# Patient Record
Sex: FEMALE | Race: WHITE | Employment: OTHER | URBAN - METROPOLITAN AREA
[De-identification: names, ages, dates, MRNs, and addresses within clinical notes are randomized per-mention and may not be internally consistent; named-entity substitution may affect disease eponyms.]

---

## 2017-01-01 ENCOUNTER — APPOINTMENT (OUTPATIENT)
Dept: CT IMAGING | Age: 67
DRG: 603 | End: 2017-01-01
Attending: INTERNAL MEDICINE
Payer: COMMERCIAL

## 2017-01-01 ENCOUNTER — OFFICE VISIT (OUTPATIENT)
Dept: OBGYN CLINIC | Age: 67
End: 2017-01-01

## 2017-01-01 ENCOUNTER — HOSPITAL ENCOUNTER (INPATIENT)
Age: 67
LOS: 3 days | Discharge: HOME | DRG: 603 | End: 2017-12-20
Attending: EMERGENCY MEDICINE | Admitting: INTERNAL MEDICINE
Payer: COMMERCIAL

## 2017-01-01 ENCOUNTER — APPOINTMENT (OUTPATIENT)
Dept: GENERAL RADIOLOGY | Age: 67
DRG: 603 | End: 2017-01-01
Attending: EMERGENCY MEDICINE
Payer: COMMERCIAL

## 2017-01-01 VITALS
DIASTOLIC BLOOD PRESSURE: 60 MMHG | SYSTOLIC BLOOD PRESSURE: 133 MMHG | BODY MASS INDEX: 44.3 KG/M2 | HEART RATE: 93 BPM | TEMPERATURE: 98.2 F | HEIGHT: 63 IN | WEIGHT: 250 LBS | RESPIRATION RATE: 18 BRPM | OXYGEN SATURATION: 95 %

## 2017-01-01 VITALS
DIASTOLIC BLOOD PRESSURE: 70 MMHG | SYSTOLIC BLOOD PRESSURE: 132 MMHG | HEIGHT: 65 IN | BODY MASS INDEX: 41.32 KG/M2 | WEIGHT: 248 LBS

## 2017-01-01 DIAGNOSIS — K75.81 LIVER CIRRHOSIS SECONDARY TO NASH (HCC): ICD-10-CM

## 2017-01-01 DIAGNOSIS — L03.116 CELLULITIS OF LEFT LOWER EXTREMITY: Primary | ICD-10-CM

## 2017-01-01 DIAGNOSIS — Z01.419 ENCOUNTER FOR GYNECOLOGICAL EXAMINATION WITHOUT ABNORMAL FINDING: Primary | ICD-10-CM

## 2017-01-01 DIAGNOSIS — D69.6 THROMBOCYTOPENIA (HCC): ICD-10-CM

## 2017-01-01 DIAGNOSIS — Z12.31 ENCOUNTER FOR SCREENING MAMMOGRAM FOR MALIGNANT NEOPLASM OF BREAST: Primary | ICD-10-CM

## 2017-01-01 DIAGNOSIS — K74.60 LIVER CIRRHOSIS SECONDARY TO NASH (HCC): ICD-10-CM

## 2017-01-01 DIAGNOSIS — I82.432 ACUTE DEEP VEIN THROMBOSIS (DVT) OF POPLITEAL VEIN OF LEFT LOWER EXTREMITY (HCC): ICD-10-CM

## 2017-01-01 DIAGNOSIS — I85.10 SECONDARY ESOPHAGEAL VARICES WITHOUT BLEEDING (HCC): ICD-10-CM

## 2017-01-01 LAB
ADDITIONAL INFORMATION 480297: NORMAL
ALBUMIN SERPL-MCNC: 2 G/DL (ref 3.5–5)
ALBUMIN SERPL-MCNC: 2.3 G/DL (ref 3.5–5)
ALBUMIN SERPL-MCNC: 2.7 G/DL (ref 3.5–5)
ALBUMIN/GLOB SERPL: 0.6 {RATIO} (ref 1.1–2.2)
ALBUMIN/GLOB SERPL: 0.7 {RATIO} (ref 1.1–2.2)
ALBUMIN/GLOB SERPL: 0.7 {RATIO} (ref 1.1–2.2)
ALP SERPL-CCNC: 116 U/L (ref 45–117)
ALP SERPL-CCNC: 120 U/L (ref 45–117)
ALP SERPL-CCNC: 137 U/L (ref 45–117)
ALT SERPL-CCNC: 28 U/L (ref 12–78)
ALT SERPL-CCNC: 30 U/L (ref 12–78)
ALT SERPL-CCNC: 40 U/L (ref 12–78)
ANION GAP SERPL CALC-SCNC: 10 MMOL/L (ref 5–15)
ANION GAP SERPL CALC-SCNC: 7 MMOL/L (ref 5–15)
ANION GAP SERPL CALC-SCNC: 7 MMOL/L (ref 5–15)
ANION GAP SERPL CALC-SCNC: 8 MMOL/L (ref 5–15)
ANION GAP SERPL CALC-SCNC: 8 MMOL/L (ref 5–15)
APPEARANCE UR: ABNORMAL
APTT PPP: 33 SEC (ref 22.1–32.5)
AST SERPL-CCNC: 50 U/L (ref 15–37)
AST SERPL-CCNC: 59 U/L (ref 15–37)
AST SERPL-CCNC: 72 U/L (ref 15–37)
AT III PPP CHRO-ACNC: 50 % (ref 75–135)
BACTERIA SPEC CULT: NORMAL
BACTERIA URNS QL MICRO: ABNORMAL /HPF
BASOPHILS # BLD: 0 K/UL (ref 0–0.1)
BASOPHILS # BLD: 0 K/UL (ref 0–0.1)
BASOPHILS NFR BLD: 0 % (ref 0–1)
BASOPHILS NFR BLD: 0 % (ref 0–1)
BILIRUB SERPL-MCNC: 1.5 MG/DL (ref 0.2–1)
BILIRUB SERPL-MCNC: 2 MG/DL (ref 0.2–1)
BILIRUB SERPL-MCNC: 2.7 MG/DL (ref 0.2–1)
BILIRUB UR QL CFM: POSITIVE
BUN SERPL-MCNC: 13 MG/DL (ref 6–20)
BUN SERPL-MCNC: 17 MG/DL (ref 6–20)
BUN SERPL-MCNC: 18 MG/DL (ref 6–20)
BUN SERPL-MCNC: 6 MG/DL (ref 6–20)
BUN SERPL-MCNC: 8 MG/DL (ref 6–20)
BUN/CREAT SERPL: 10 (ref 12–20)
BUN/CREAT SERPL: 16 (ref 12–20)
BUN/CREAT SERPL: 18 (ref 12–20)
BUN/CREAT SERPL: 19 (ref 12–20)
BUN/CREAT SERPL: 8 (ref 12–20)
CALCIUM SERPL-MCNC: 7.8 MG/DL (ref 8.5–10.1)
CALCIUM SERPL-MCNC: 7.9 MG/DL (ref 8.5–10.1)
CALCIUM SERPL-MCNC: 8 MG/DL (ref 8.5–10.1)
CALCIUM SERPL-MCNC: 8.1 MG/DL (ref 8.5–10.1)
CALCIUM SERPL-MCNC: 8.8 MG/DL (ref 8.5–10.1)
CC UR VC: NORMAL
CHLORIDE SERPL-SCNC: 106 MMOL/L (ref 97–108)
CHLORIDE SERPL-SCNC: 107 MMOL/L (ref 97–108)
CHLORIDE SERPL-SCNC: 109 MMOL/L (ref 97–108)
CK MB CFR SERPL CALC: NORMAL % (ref 0–2.5)
CK MB SERPL-MCNC: <1 NG/ML (ref 5–25)
CK SERPL-CCNC: 71 U/L (ref 26–192)
CO2 SERPL-SCNC: 24 MMOL/L (ref 21–32)
CO2 SERPL-SCNC: 25 MMOL/L (ref 21–32)
CO2 SERPL-SCNC: 26 MMOL/L (ref 21–32)
COLOR UR: ABNORMAL
COMMENT, 511217: NORMAL
CREAT SERPL-MCNC: 0.74 MG/DL (ref 0.55–1.02)
CREAT SERPL-MCNC: 0.8 MG/DL (ref 0.55–1.02)
CREAT SERPL-MCNC: 0.8 MG/DL (ref 0.55–1.02)
CREAT SERPL-MCNC: 0.91 MG/DL (ref 0.55–1.02)
CREAT SERPL-MCNC: 0.99 MG/DL (ref 0.55–1.02)
CRP SERPL-MCNC: 3.18 MG/DL
DATE LAST DOSE: ABNORMAL
DATE LAST DOSE: ABNORMAL
DIFFERENTIAL METHOD BLD: ABNORMAL
EOSINOPHIL # BLD: 0.1 K/UL (ref 0–0.4)
EOSINOPHIL # BLD: 0.2 K/UL (ref 0–0.4)
EOSINOPHIL NFR BLD: 1 % (ref 0–7)
EOSINOPHIL NFR BLD: 3 % (ref 0–7)
EPITH CASTS URNS QL MICRO: ABNORMAL /LPF
ERYTHROCYTE [DISTWIDTH] IN BLOOD BY AUTOMATED COUNT: 14.7 % (ref 11.5–14.5)
ERYTHROCYTE [DISTWIDTH] IN BLOOD BY AUTOMATED COUNT: 14.9 % (ref 11.5–14.5)
ERYTHROCYTE [DISTWIDTH] IN BLOOD BY AUTOMATED COUNT: 15 % (ref 11.5–14.5)
ERYTHROCYTE [DISTWIDTH] IN BLOOD BY AUTOMATED COUNT: 15 % (ref 11.5–14.5)
ERYTHROCYTE [DISTWIDTH] IN BLOOD BY AUTOMATED COUNT: 15.1 % (ref 11.5–14.5)
ERYTHROCYTE [SEDIMENTATION RATE] IN BLOOD: 19 MM/HR (ref 0–30)
F2 GENE MUT ANL BLD/T: NORMAL
F5 GENE MUT ANL BLD/T: NORMAL
GLOBULIN SER CALC-MCNC: 3.3 G/DL (ref 2–4)
GLOBULIN SER CALC-MCNC: 3.5 G/DL (ref 2–4)
GLOBULIN SER CALC-MCNC: 4.1 G/DL (ref 2–4)
GLUCOSE SERPL-MCNC: 121 MG/DL (ref 65–100)
GLUCOSE SERPL-MCNC: 126 MG/DL (ref 65–100)
GLUCOSE SERPL-MCNC: 127 MG/DL (ref 65–100)
GLUCOSE SERPL-MCNC: 85 MG/DL (ref 65–100)
GLUCOSE SERPL-MCNC: 91 MG/DL (ref 65–100)
GLUCOSE UR STRIP.AUTO-MCNC: NEGATIVE MG/DL
HBV CORE IGM SER QL: NONREACTIVE
HBV SURFACE AB SER QL: NONREACTIVE
HBV SURFACE AB SER-ACNC: <3.1 MIU/ML
HBV SURFACE AG SER QL: <0.1 INDEX
HBV SURFACE AG SER QL: NEGATIVE
HCT VFR BLD AUTO: 31.5 % (ref 35–47)
HCT VFR BLD AUTO: 31.6 % (ref 35–47)
HCT VFR BLD AUTO: 32.5 % (ref 35–47)
HCT VFR BLD AUTO: 33.4 % (ref 35–47)
HCT VFR BLD AUTO: 38 % (ref 35–47)
HCV AB SERPL QL IA: NONREACTIVE
HCV COMMENT,HCGAC: NORMAL
HGB BLD-MCNC: 10.5 G/DL (ref 11.5–16)
HGB BLD-MCNC: 10.6 G/DL (ref 11.5–16)
HGB BLD-MCNC: 10.7 G/DL (ref 11.5–16)
HGB BLD-MCNC: 11 G/DL (ref 11.5–16)
HGB BLD-MCNC: 12.6 G/DL (ref 11.5–16)
HGB UR QL STRIP: ABNORMAL
INR PPP: 1.5 (ref 0.9–1.1)
INR PPP: 1.6 (ref 0.9–1.1)
KETONES UR QL STRIP.AUTO: ABNORMAL MG/DL
LA PPP-IMP: NORMAL
LACTATE SERPL-SCNC: 1.2 MMOL/L (ref 0.4–2)
LACTATE SERPL-SCNC: 2.2 MMOL/L (ref 0.4–2)
LEUKOCYTE ESTERASE UR QL STRIP.AUTO: ABNORMAL
LYMPHOCYTES # BLD: 1 K/UL (ref 0.8–3.5)
LYMPHOCYTES # BLD: 1.2 K/UL (ref 0.8–3.5)
LYMPHOCYTES NFR BLD: 11 % (ref 12–49)
LYMPHOCYTES NFR BLD: 15 % (ref 12–49)
MAGNESIUM SERPL-MCNC: 1.7 MG/DL (ref 1.6–2.4)
MCH RBC QN AUTO: 31.6 PG (ref 26–34)
MCH RBC QN AUTO: 31.7 PG (ref 26–34)
MCH RBC QN AUTO: 31.8 PG (ref 26–34)
MCH RBC QN AUTO: 31.8 PG (ref 26–34)
MCH RBC QN AUTO: 32.2 PG (ref 26–34)
MCHC RBC AUTO-ENTMCNC: 32.9 G/DL (ref 30–36.5)
MCHC RBC AUTO-ENTMCNC: 32.9 G/DL (ref 30–36.5)
MCHC RBC AUTO-ENTMCNC: 33.2 G/DL (ref 30–36.5)
MCHC RBC AUTO-ENTMCNC: 33.3 G/DL (ref 30–36.5)
MCHC RBC AUTO-ENTMCNC: 33.5 G/DL (ref 30–36.5)
MCV RBC AUTO: 94.9 FL (ref 80–99)
MCV RBC AUTO: 95.5 FL (ref 80–99)
MCV RBC AUTO: 96 FL (ref 80–99)
MCV RBC AUTO: 96.2 FL (ref 80–99)
MCV RBC AUTO: 97.2 FL (ref 80–99)
MONOCYTES # BLD: 0.7 K/UL (ref 0–1)
MONOCYTES # BLD: 0.8 K/UL (ref 0–1)
MONOCYTES NFR BLD: 10 % (ref 5–13)
MONOCYTES NFR BLD: 7 % (ref 5–13)
NEUTS SEG # BLD: 5.6 K/UL (ref 1.8–8)
NEUTS SEG # BLD: 7.9 K/UL (ref 1.8–8)
NEUTS SEG NFR BLD: 72 % (ref 32–75)
NEUTS SEG NFR BLD: 81 % (ref 32–75)
NITRITE UR QL STRIP.AUTO: NEGATIVE
PERIPHERAL SMEAR,PSM: NORMAL
PH UR STRIP: 5.5 [PH] (ref 5–8)
PLATELET # BLD AUTO: 41 K/UL (ref 150–400)
PLATELET # BLD AUTO: 44 K/UL (ref 150–400)
PLATELET # BLD AUTO: 52 K/UL (ref 150–400)
PLATELET # BLD AUTO: 57 K/UL (ref 150–400)
PLATELET # BLD AUTO: 59 K/UL (ref 150–400)
POTASSIUM SERPL-SCNC: 3.5 MMOL/L (ref 3.5–5.1)
POTASSIUM SERPL-SCNC: 3.6 MMOL/L (ref 3.5–5.1)
POTASSIUM SERPL-SCNC: 3.7 MMOL/L (ref 3.5–5.1)
POTASSIUM SERPL-SCNC: 3.7 MMOL/L (ref 3.5–5.1)
POTASSIUM SERPL-SCNC: 3.8 MMOL/L (ref 3.5–5.1)
PROT C AG PPP IA-ACNC: 25 % (ref 60–150)
PROT S ACT/NOR PPP: 65 % (ref 57–157)
PROT S PPP-ACNC: 38 % (ref 60–150)
PROT SERPL-MCNC: 5.5 G/DL (ref 6.4–8.2)
PROT SERPL-MCNC: 5.6 G/DL (ref 6.4–8.2)
PROT SERPL-MCNC: 6.8 G/DL (ref 6.4–8.2)
PROT UR STRIP-MCNC: NEGATIVE MG/DL
PROTHROMBIN TIME: 14.8 SEC (ref 9–11.1)
PROTHROMBIN TIME: 16.2 SEC (ref 9–11.1)
RBC # BLD AUTO: 3.3 M/UL (ref 3.8–5.2)
RBC # BLD AUTO: 3.33 M/UL (ref 3.8–5.2)
RBC # BLD AUTO: 3.38 M/UL (ref 3.8–5.2)
RBC # BLD AUTO: 3.48 M/UL (ref 3.8–5.2)
RBC # BLD AUTO: 3.91 M/UL (ref 3.8–5.2)
RBC #/AREA URNS HPF: ABNORMAL /HPF (ref 0–5)
RBC MORPH BLD: ABNORMAL
REPORTED DOSE,DOSE: ABNORMAL UNITS
REPORTED DOSE,DOSE: ABNORMAL UNITS
REPORTED DOSE/TIME,TMG: 1700
REPORTED DOSE/TIME,TMG: 1700
SCREEN APTT: 41.9 SEC (ref 0–51.9)
SCREEN DRVVT: 39.9 SEC (ref 0–47)
SERVICE CMNT-IMP: NORMAL
SODIUM SERPL-SCNC: 138 MMOL/L (ref 136–145)
SODIUM SERPL-SCNC: 139 MMOL/L (ref 136–145)
SODIUM SERPL-SCNC: 140 MMOL/L (ref 136–145)
SODIUM SERPL-SCNC: 141 MMOL/L (ref 136–145)
SODIUM SERPL-SCNC: 143 MMOL/L (ref 136–145)
SP GR UR REFRACTOMETRY: 1.03 (ref 1–1.03)
THERAPEUTIC RANGE,PTTT: ABNORMAL SECS (ref 58–77)
UA: UC IF INDICATED,UAUC: ABNORMAL
UROBILINOGEN UR QL STRIP.AUTO: 1 EU/DL (ref 0.2–1)
VANCOMYCIN TROUGH SERPL-MCNC: 15.6 UG/ML (ref 5–10)
VANCOMYCIN TROUGH SERPL-MCNC: 4.4 UG/ML (ref 5–10)
VIT B12 SERPL-MCNC: 952 PG/ML (ref 211–911)
WBC # BLD AUTO: 3.4 K/UL (ref 3.6–11)
WBC # BLD AUTO: 4 K/UL (ref 3.6–11)
WBC # BLD AUTO: 5.3 K/UL (ref 3.6–11)
WBC # BLD AUTO: 7.8 K/UL (ref 3.6–11)
WBC # BLD AUTO: 9.7 K/UL (ref 3.6–11)
WBC URNS QL MICRO: ABNORMAL /HPF (ref 0–4)

## 2017-01-01 PROCEDURE — 85730 THROMBOPLASTIN TIME PARTIAL: CPT | Performed by: EMERGENCY MEDICINE

## 2017-01-01 PROCEDURE — 82550 ASSAY OF CK (CPK): CPT | Performed by: EMERGENCY MEDICINE

## 2017-01-01 PROCEDURE — 74011250636 HC RX REV CODE- 250/636: Performed by: INTERNAL MEDICINE

## 2017-01-01 PROCEDURE — 93971 EXTREMITY STUDY: CPT

## 2017-01-01 PROCEDURE — 80053 COMPREHEN METABOLIC PANEL: CPT | Performed by: EMERGENCY MEDICINE

## 2017-01-01 PROCEDURE — 85302 CLOT INHIBIT PROT C ANTIGEN: CPT | Performed by: INTERNAL MEDICINE

## 2017-01-01 PROCEDURE — 85610 PROTHROMBIN TIME: CPT | Performed by: INTERNAL MEDICINE

## 2017-01-01 PROCEDURE — 85613 RUSSELL VIPER VENOM DILUTED: CPT | Performed by: INTERNAL MEDICINE

## 2017-01-01 PROCEDURE — 36415 COLL VENOUS BLD VENIPUNCTURE: CPT | Performed by: INTERNAL MEDICINE

## 2017-01-01 PROCEDURE — 81001 URINALYSIS AUTO W/SCOPE: CPT | Performed by: EMERGENCY MEDICINE

## 2017-01-01 PROCEDURE — 80048 BASIC METABOLIC PNL TOTAL CA: CPT | Performed by: INTERNAL MEDICINE

## 2017-01-01 PROCEDURE — 99284 EMERGENCY DEPT VISIT MOD MDM: CPT

## 2017-01-01 PROCEDURE — 83605 ASSAY OF LACTIC ACID: CPT | Performed by: EMERGENCY MEDICINE

## 2017-01-01 PROCEDURE — 74011250637 HC RX REV CODE- 250/637: Performed by: INTERNAL MEDICINE

## 2017-01-01 PROCEDURE — 74011000258 HC RX REV CODE- 258: Performed by: EMERGENCY MEDICINE

## 2017-01-01 PROCEDURE — 82607 VITAMIN B-12: CPT | Performed by: INTERNAL MEDICINE

## 2017-01-01 PROCEDURE — 74011000258 HC RX REV CODE- 258: Performed by: INTERNAL MEDICINE

## 2017-01-01 PROCEDURE — 74011636320 HC RX REV CODE- 636/320: Performed by: RADIOLOGY

## 2017-01-01 PROCEDURE — 80053 COMPREHEN METABOLIC PANEL: CPT | Performed by: INTERNAL MEDICINE

## 2017-01-01 PROCEDURE — 96361 HYDRATE IV INFUSION ADD-ON: CPT

## 2017-01-01 PROCEDURE — 86803 HEPATITIS C AB TEST: CPT | Performed by: INTERNAL MEDICINE

## 2017-01-01 PROCEDURE — 99218 HC RM OBSERVATION: CPT

## 2017-01-01 PROCEDURE — 85027 COMPLETE CBC AUTOMATED: CPT | Performed by: INTERNAL MEDICINE

## 2017-01-01 PROCEDURE — 87040 BLOOD CULTURE FOR BACTERIA: CPT | Performed by: EMERGENCY MEDICINE

## 2017-01-01 PROCEDURE — 80202 ASSAY OF VANCOMYCIN: CPT | Performed by: INTERNAL MEDICINE

## 2017-01-01 PROCEDURE — 36415 COLL VENOUS BLD VENIPUNCTURE: CPT | Performed by: EMERGENCY MEDICINE

## 2017-01-01 PROCEDURE — 83735 ASSAY OF MAGNESIUM: CPT | Performed by: INTERNAL MEDICINE

## 2017-01-01 PROCEDURE — 65660000000 HC RM CCU STEPDOWN

## 2017-01-01 PROCEDURE — 96365 THER/PROPH/DIAG IV INF INIT: CPT

## 2017-01-01 PROCEDURE — 96367 TX/PROPH/DG ADDL SEQ IV INF: CPT

## 2017-01-01 PROCEDURE — 87086 URINE CULTURE/COLONY COUNT: CPT | Performed by: EMERGENCY MEDICINE

## 2017-01-01 PROCEDURE — 83605 ASSAY OF LACTIC ACID: CPT | Performed by: INTERNAL MEDICINE

## 2017-01-01 PROCEDURE — 74011250636 HC RX REV CODE- 250/636: Performed by: EMERGENCY MEDICINE

## 2017-01-01 PROCEDURE — 85300 ANTITHROMBIN III ACTIVITY: CPT | Performed by: INTERNAL MEDICINE

## 2017-01-01 PROCEDURE — 72191 CT ANGIOGRAPH PELV W/O&W/DYE: CPT

## 2017-01-01 PROCEDURE — 86140 C-REACTIVE PROTEIN: CPT | Performed by: EMERGENCY MEDICINE

## 2017-01-01 PROCEDURE — 81240 F2 GENE: CPT | Performed by: INTERNAL MEDICINE

## 2017-01-01 PROCEDURE — 85025 COMPLETE CBC W/AUTO DIFF WBC: CPT | Performed by: EMERGENCY MEDICINE

## 2017-01-01 PROCEDURE — 65270000029 HC RM PRIVATE

## 2017-01-01 PROCEDURE — 87340 HEPATITIS B SURFACE AG IA: CPT | Performed by: INTERNAL MEDICINE

## 2017-01-01 PROCEDURE — 86705 HEP B CORE ANTIBODY IGM: CPT | Performed by: INTERNAL MEDICINE

## 2017-01-01 PROCEDURE — 85652 RBC SED RATE AUTOMATED: CPT | Performed by: EMERGENCY MEDICINE

## 2017-01-01 PROCEDURE — 86706 HEP B SURFACE ANTIBODY: CPT | Performed by: INTERNAL MEDICINE

## 2017-01-01 PROCEDURE — 85305 CLOT INHIBIT PROT S TOTAL: CPT | Performed by: INTERNAL MEDICINE

## 2017-01-01 PROCEDURE — 73590 X-RAY EXAM OF LOWER LEG: CPT

## 2017-01-01 PROCEDURE — 81241 F5 GENE: CPT | Performed by: INTERNAL MEDICINE

## 2017-01-01 PROCEDURE — 85025 COMPLETE CBC W/AUTO DIFF WBC: CPT | Performed by: INTERNAL MEDICINE

## 2017-01-01 RX ORDER — ENOXAPARIN SODIUM 100 MG/ML
1 INJECTION SUBCUTANEOUS
Status: COMPLETED | OUTPATIENT
Start: 2017-01-01 | End: 2017-01-01

## 2017-01-01 RX ORDER — OMEPRAZOLE 20 MG/1
20 CAPSULE, DELAYED RELEASE ORAL DAILY
Status: DISCONTINUED | OUTPATIENT
Start: 2017-01-01 | End: 2017-01-01 | Stop reason: HOSPADM

## 2017-01-01 RX ORDER — VANCOMYCIN/0.9 % SOD CHLORIDE 1.5G/250ML
1500 PLASTIC BAG, INJECTION (ML) INTRAVENOUS EVERY 12 HOURS
Status: DISCONTINUED | OUTPATIENT
Start: 2017-01-01 | End: 2017-01-01 | Stop reason: HOSPADM

## 2017-01-01 RX ORDER — FUROSEMIDE 20 MG/1
20 TABLET ORAL 2 TIMES DAILY
COMMUNITY
End: 2018-01-01

## 2017-01-01 RX ORDER — POTASSIUM CHLORIDE 750 MG/1
40 TABLET, FILM COATED, EXTENDED RELEASE ORAL
Status: COMPLETED | OUTPATIENT
Start: 2017-01-01 | End: 2017-01-01

## 2017-01-01 RX ORDER — IBUPROFEN 400 MG/1
400 TABLET ORAL ONCE
Status: COMPLETED | OUTPATIENT
Start: 2017-01-01 | End: 2017-01-01

## 2017-01-01 RX ORDER — SODIUM CHLORIDE 0.9 % (FLUSH) 0.9 %
5-10 SYRINGE (ML) INJECTION AS NEEDED
Status: DISCONTINUED | OUTPATIENT
Start: 2017-01-01 | End: 2017-01-01 | Stop reason: HOSPADM

## 2017-01-01 RX ORDER — FUROSEMIDE 40 MG/1
40 TABLET ORAL DAILY
Status: DISCONTINUED | OUTPATIENT
Start: 2017-01-01 | End: 2017-01-01 | Stop reason: HOSPADM

## 2017-01-01 RX ORDER — CEPHALEXIN 500 MG/1
500 CAPSULE ORAL 4 TIMES DAILY
COMMUNITY
End: 2017-01-01

## 2017-01-01 RX ORDER — FUROSEMIDE 20 MG/1
20 TABLET ORAL EVERY EVENING
COMMUNITY
End: 2018-01-01 | Stop reason: SDUPTHER

## 2017-01-01 RX ORDER — DOXYCYCLINE 100 MG/1
100 CAPSULE ORAL 2 TIMES DAILY
Qty: 10 CAP | Refills: 0 | Status: SHIPPED | OUTPATIENT
Start: 2017-01-01 | End: 2017-01-01

## 2017-01-01 RX ORDER — ONDANSETRON 2 MG/ML
4 INJECTION INTRAMUSCULAR; INTRAVENOUS
Status: DISCONTINUED | OUTPATIENT
Start: 2017-01-01 | End: 2017-01-01 | Stop reason: HOSPADM

## 2017-01-01 RX ORDER — SODIUM CHLORIDE 9 MG/ML
75 INJECTION, SOLUTION INTRAVENOUS CONTINUOUS
Status: DISCONTINUED | OUTPATIENT
Start: 2017-01-01 | End: 2017-01-01

## 2017-01-01 RX ORDER — OMEPRAZOLE 20 MG/1
20 CAPSULE, DELAYED RELEASE ORAL DAILY
COMMUNITY
Start: 2017-01-01 | End: 2018-01-01

## 2017-01-01 RX ORDER — MORPHINE SULFATE 2 MG/ML
2 INJECTION, SOLUTION INTRAMUSCULAR; INTRAVENOUS
Status: DISCONTINUED | OUTPATIENT
Start: 2017-01-01 | End: 2017-01-01 | Stop reason: HOSPADM

## 2017-01-01 RX ORDER — FUROSEMIDE 20 MG/1
20 TABLET ORAL EVERY EVENING
Status: DISCONTINUED | OUTPATIENT
Start: 2017-01-01 | End: 2017-01-01 | Stop reason: HOSPADM

## 2017-01-01 RX ORDER — SODIUM CHLORIDE 0.9 % (FLUSH) 0.9 %
5-10 SYRINGE (ML) INJECTION EVERY 8 HOURS
Status: DISCONTINUED | OUTPATIENT
Start: 2017-01-01 | End: 2017-01-01 | Stop reason: HOSPADM

## 2017-01-01 RX ORDER — OXYCODONE HYDROCHLORIDE 5 MG/1
5 TABLET ORAL
Status: DISCONTINUED | OUTPATIENT
Start: 2017-01-01 | End: 2017-01-01 | Stop reason: HOSPADM

## 2017-01-01 RX ORDER — VANCOMYCIN/0.9 % SOD CHLORIDE 1.5G/250ML
1500 PLASTIC BAG, INJECTION (ML) INTRAVENOUS EVERY 24 HOURS
Status: DISCONTINUED | OUTPATIENT
Start: 2017-01-01 | End: 2017-01-01 | Stop reason: DRUGHIGH

## 2017-01-01 RX ORDER — ENOXAPARIN SODIUM 100 MG/ML
1 INJECTION SUBCUTANEOUS
Status: DISCONTINUED | OUTPATIENT
Start: 2017-01-01 | End: 2017-01-01

## 2017-01-01 RX ORDER — ACETAMINOPHEN 325 MG/1
650 TABLET ORAL
Status: DISCONTINUED | OUTPATIENT
Start: 2017-01-01 | End: 2017-01-01 | Stop reason: HOSPADM

## 2017-01-01 RX ORDER — HYDROCODONE BITARTRATE AND ACETAMINOPHEN 5; 325 MG/1; MG/1
1 TABLET ORAL
Status: DISCONTINUED | OUTPATIENT
Start: 2017-01-01 | End: 2017-01-01

## 2017-01-01 RX ORDER — NALOXONE HYDROCHLORIDE 0.4 MG/ML
0.4 INJECTION, SOLUTION INTRAMUSCULAR; INTRAVENOUS; SUBCUTANEOUS AS NEEDED
Status: DISCONTINUED | OUTPATIENT
Start: 2017-01-01 | End: 2017-01-01 | Stop reason: HOSPADM

## 2017-01-01 RX ADMIN — SODIUM CHLORIDE 3 G: 900 INJECTION, SOLUTION INTRAVENOUS at 20:34

## 2017-01-01 RX ADMIN — ACETAMINOPHEN 650 MG: 325 TABLET ORAL at 00:35

## 2017-01-01 RX ADMIN — SODIUM CHLORIDE 75 ML/HR: 900 INJECTION, SOLUTION INTRAVENOUS at 20:03

## 2017-01-01 RX ADMIN — POTASSIUM CHLORIDE 40 MEQ: 750 TABLET, FILM COATED, EXTENDED RELEASE ORAL at 10:26

## 2017-01-01 RX ADMIN — SODIUM CHLORIDE 1000 ML: 900 INJECTION, SOLUTION INTRAVENOUS at 18:30

## 2017-01-01 RX ADMIN — SODIUM CHLORIDE 3 G: 900 INJECTION, SOLUTION INTRAVENOUS at 09:19

## 2017-01-01 RX ADMIN — VANCOMYCIN HYDROCHLORIDE 2000 MG: 10 INJECTION, POWDER, LYOPHILIZED, FOR SOLUTION INTRAVENOUS at 19:08

## 2017-01-01 RX ADMIN — VANCOMYCIN HYDROCHLORIDE 1500 MG: 10 INJECTION, POWDER, LYOPHILIZED, FOR SOLUTION INTRAVENOUS at 17:05

## 2017-01-01 RX ADMIN — VANCOMYCIN HYDROCHLORIDE 1500 MG: 10 INJECTION, POWDER, LYOPHILIZED, FOR SOLUTION INTRAVENOUS at 17:28

## 2017-01-01 RX ADMIN — SODIUM CHLORIDE 3 G: 900 INJECTION, SOLUTION INTRAVENOUS at 03:34

## 2017-01-01 RX ADMIN — Medication 10 ML: at 06:34

## 2017-01-01 RX ADMIN — Medication 10 ML: at 21:04

## 2017-01-01 RX ADMIN — SODIUM CHLORIDE 3 G: 900 INJECTION, SOLUTION INTRAVENOUS at 21:03

## 2017-01-01 RX ADMIN — VANCOMYCIN HYDROCHLORIDE 1500 MG: 10 INJECTION, POWDER, LYOPHILIZED, FOR SOLUTION INTRAVENOUS at 17:48

## 2017-01-01 RX ADMIN — SODIUM CHLORIDE 3 G: 900 INJECTION, SOLUTION INTRAVENOUS at 15:22

## 2017-01-01 RX ADMIN — OMEPRAZOLE 20 MG: 20 CAPSULE, DELAYED RELEASE ORAL at 08:41

## 2017-01-01 RX ADMIN — ENOXAPARIN SODIUM 120 MG: 60 INJECTION SUBCUTANEOUS at 19:59

## 2017-01-01 RX ADMIN — VANCOMYCIN HYDROCHLORIDE 1500 MG: 10 INJECTION, POWDER, LYOPHILIZED, FOR SOLUTION INTRAVENOUS at 05:09

## 2017-01-01 RX ADMIN — SODIUM CHLORIDE 3 G: 900 INJECTION, SOLUTION INTRAVENOUS at 02:59

## 2017-01-01 RX ADMIN — Medication 10 ML: at 16:29

## 2017-01-01 RX ADMIN — SODIUM CHLORIDE 3 G: 900 INJECTION, SOLUTION INTRAVENOUS at 14:35

## 2017-01-01 RX ADMIN — FUROSEMIDE 40 MG: 40 TABLET ORAL at 10:27

## 2017-01-01 RX ADMIN — VANCOMYCIN HYDROCHLORIDE 1500 MG: 10 INJECTION, POWDER, LYOPHILIZED, FOR SOLUTION INTRAVENOUS at 05:11

## 2017-01-01 RX ADMIN — OMEPRAZOLE 20 MG: 20 CAPSULE, DELAYED RELEASE ORAL at 10:26

## 2017-01-01 RX ADMIN — PIPERACILLIN SODIUM AND TAZOBACTAM SODIUM 3.38 G: 3; .375 INJECTION, POWDER, LYOPHILIZED, FOR SOLUTION INTRAVENOUS at 18:32

## 2017-01-01 RX ADMIN — OMEPRAZOLE 20 MG: 20 CAPSULE, DELAYED RELEASE ORAL at 08:39

## 2017-01-01 RX ADMIN — OXYCODONE HYDROCHLORIDE 5 MG: 5 TABLET ORAL at 08:39

## 2017-01-01 RX ADMIN — SODIUM CHLORIDE 3 G: 900 INJECTION, SOLUTION INTRAVENOUS at 08:41

## 2017-01-01 RX ADMIN — SODIUM CHLORIDE 3 G: 900 INJECTION, SOLUTION INTRAVENOUS at 16:27

## 2017-01-01 RX ADMIN — Medication 10 ML: at 21:51

## 2017-01-01 RX ADMIN — OMEPRAZOLE 20 MG: 20 CAPSULE, DELAYED RELEASE ORAL at 10:03

## 2017-01-01 RX ADMIN — Medication 10 ML: at 14:00

## 2017-01-01 RX ADMIN — SODIUM CHLORIDE 3 G: 900 INJECTION, SOLUTION INTRAVENOUS at 10:06

## 2017-01-01 RX ADMIN — Medication 10 ML: at 05:58

## 2017-01-01 RX ADMIN — Medication 10 ML: at 15:22

## 2017-01-01 RX ADMIN — SODIUM CHLORIDE 3 G: 900 INJECTION, SOLUTION INTRAVENOUS at 15:23

## 2017-01-01 RX ADMIN — FUROSEMIDE 20 MG: 20 TABLET ORAL at 17:49

## 2017-01-01 RX ADMIN — IOPAMIDOL 98 ML: 755 INJECTION, SOLUTION INTRAVENOUS at 12:17

## 2017-01-01 RX ADMIN — SODIUM CHLORIDE 3 G: 900 INJECTION, SOLUTION INTRAVENOUS at 02:52

## 2017-01-01 RX ADMIN — SODIUM CHLORIDE 3 G: 900 INJECTION, SOLUTION INTRAVENOUS at 10:25

## 2017-01-01 RX ADMIN — FUROSEMIDE 40 MG: 40 TABLET ORAL at 10:03

## 2017-01-01 RX ADMIN — IBUPROFEN 400 MG: 400 TABLET, FILM COATED ORAL at 00:52

## 2017-01-01 RX ADMIN — Medication 10 ML: at 22:00

## 2017-12-08 PROBLEM — E66.01 OBESITY, MORBID (HCC): Status: ACTIVE | Noted: 2017-01-01

## 2017-12-08 NOTE — PATIENT INSTRUCTIONS

## 2017-12-08 NOTE — PROGRESS NOTES
Annual exam ages 72+    Dianna Mckeon is a ,  69 y.o. female [facility] No LMP recorded. Patient is postmenopausal.  She presents for her annual checkup. She is having no significant problems. With regard to the Gardasil vaccine, she is older than the age for which it is FDA approved. Menstrual status:    Her periods are nonexistent in flow due to being postmenopausal.     She denies dysmenorrhea. Hormonal status:  She reports no perimenstrual type symptoms. She is not having vasomotor symptoms. The patient is not using any ERT. Sexual history:    She  reports that she does not currently engage in sexual activity but has had male partners.; She reports using the following method of birth control/protection: None. Medical conditions:    Since her last annual GYN exam about one year ago, she has not the following changes in her health history: none. Pap and Mammogram History:    Her most recent Pap smear was normal obtained 5 year(s) ago. The patient had a recent mammogram on 17 which was negative for malignancy. Breast Cancer History/Substance Abuse: negative    Osteoporosis History:    Family history does not include a first or second degree relative with osteopenia or osteoporosis. A bone density scan was obtained 1 year ago and revealed mild osteopenia.      Past Medical History:   Diagnosis Date    Anemia     Cirrhosis ([address]     H/O bone density study 10/28/2016    Osteopenia    Hx of bone density study 06    Normal x2    Hypermenorrhea     Hypertension     Leiomyoma of uterus, unspecified     Menopausal syndrome     Osteopenia 2016    Thrombocytopenia ([address]      Past Surgical History:   Procedure Laterality Date    HX HYSTEROSCOPY  1996    w/ D&C    HX OTHER SURGICAL  02/19/10    tumor in bladder removed x2 in     HX OTHER SURGICAL  03/25/10    Liver Biopsy--Cirrhosis    HX TUBAL LIGATION         Current Outpatient Prescriptions Medication Sig Dispense Refill    omeprazole (PRILOSEC) 20 mg capsule Take 20 mg by mouth daily.  furosemide (LASIX) 40 mg tablet       metoprolol (LOPRESSOR) 25 mg tablet       calcium-cholecalciferol, d3, (CALCIUM 600 + D) 600-125 mg-unit tab Take  by mouth. Allergies: Review of patient's allergies indicates no known allergies. Tobacco History:  reports that she has never smoked. She has never used smokeless tobacco.  Alcohol Abuse:  reports that she does not drink alcohol. Drug Abuse:  reports that she does not use illicit drugs.     Family Medical/Cancer History:   Family History   Problem Relation Age of Onset    Diabetes Brother      Type II    Heart Disease Father     Heart Attack Father 64    Hypertension Mother     Osteoporosis Mother      +Hip fracture        Review of Systems - History obtained from the patient  Constitutional: negative for weight loss, fever, night sweats  HEENT: negative for hearing loss, earache, congestion, snoring, sorethroat  CV: negative for chest pain, palpitations, edema  Resp: negative for cough, shortness of breath, wheezing  GI: negative for change in bowel habits, abdominal pain, black or bloody stools  : negative for frequency, dysuria, hematuria, vaginal discharge  MSK: negative for back pain, joint pain, muscle pain  Breast: negative for breast lumps, nipple discharge, galactorrhea  Skin :negative for itching, rash, hives  Neuro: negative for dizziness, headache, confusion, weakness  Psych: negative for anxiety, depression, change in mood  Heme/lymph: negative for bleeding, bruising, pallor    Physical Exam    Visit Vitals    /70    Ht 5' 5\" (1.651 m)    Wt 248 lb (112.5 kg)    BMI 41.27 kg/m2       Constitutional  · Appearance: well-nourished, well developed, alert, in no acute distress    HENT  · Head and Face: appears normal    Neck  · Inspection/Palpation: normal appearance, no masses or tenderness  · Lymph Nodes: no lymphadenopathy present  · Thyroid: gland size normal, nontender, no nodules or masses present on palpation    Chest  · Respiratory Effort: breathing unlabored  · Auscultation: normal breath sounds    Cardiovascular  · Heart:  · Auscultation: regular rate and rhythm without murmur    Breasts  · Inspection of Breasts: breasts symmetrical, no skin changes, no discharge present, nipple appearance normal, no skin retraction present  · Palpation of Breasts and Axillae: no masses present on palpation, no breast tenderness  · Axillary Lymph Nodes: no lymphadenopathy present    Gastrointestinal  · Abdominal Examination: abdomen non-tender to palpation, normal bowel sounds, no masses present  · Liver and spleen: no hepatomegaly present, spleen not palpable  · Hernias: no hernias identified    Genitourinary  · External Genitalia: normal appearance for age, no discharge present, no tenderness present, no inflammatory lesions present, no masses present, atrophy present  · Vagina: atrophic but otherwise normal vaginal vault without central or paravaginal defects, no discharge present, no inflammatory lesions present, no masses present  · Bladder: non-tender to palpation  · Urethra: appears normal  · Cervix: normal   · Uterus: normal size, shape and consistency  · Adnexa: no adnexal tenderness present, no adnexal masses present  · Perineum: perineum within normal limits, no evidence of trauma, no rashes or skin lesions present  · Anus: anus within normal limits, no hemorrhoids present  · Inguinal Lymph Nodes: no lymphadenopathy present    Skin  · General Inspection: no rash, no lesions identified    Neurologic/Psychiatric  · Mental Status:  · Orientation: grossly oriented to person, place and time  · Mood and Affect: mood normal, affect appropriate    Assessment:  Routine gynecologic examination  Her current medical status is satisfactory with no evidence of significant gynecologic issues.     Plan:  Counseled re: diet, exercise, healthy lifestyle  Return for yearly wellness visits  Rec annual mammogram

## 2017-12-16 PROBLEM — L03.90 CELLULITIS: Status: ACTIVE | Noted: 2017-01-01

## 2017-12-16 PROBLEM — K75.81 LIVER CIRRHOSIS SECONDARY TO NASH (HCC): Status: ACTIVE | Noted: 2017-01-01

## 2017-12-16 PROBLEM — D69.6 THROMBOCYTOPENIA (HCC): Status: ACTIVE | Noted: 2017-01-01

## 2017-12-16 PROBLEM — I82.409 DVT (DEEP VENOUS THROMBOSIS) (HCC): Status: ACTIVE | Noted: 2017-01-01

## 2017-12-16 PROBLEM — K74.60 LIVER CIRRHOSIS SECONDARY TO NASH (HCC): Status: ACTIVE | Noted: 2017-01-01

## 2017-12-16 PROBLEM — O22.30 DVT (DEEP VEIN THROMBOSIS) IN PREGNANCY: Status: ACTIVE | Noted: 2017-01-01

## 2017-12-16 PROBLEM — I85.00 ESOPHAGEAL VARICES (HCC): Status: ACTIVE | Noted: 2017-01-01

## 2017-12-16 PROBLEM — O22.30 DVT (DEEP VEIN THROMBOSIS) IN PREGNANCY: Status: RESOLVED | Noted: 2017-01-01 | Resolved: 2017-01-01

## 2017-12-16 NOTE — ED PROVIDER NOTES
HPI Comments: 69 y.o. female with past medical history significant for anemia, HTN, thrombocytopenia, osteopenia who presents from home with chief complaint of cellulitis. Pt c/o worsening redness and swelling of the L lower leg onset this morning. Pt took two doses of Keflex with no relief of sx. Pt;s platelet count has been about 40 recently. Pt ate lunch and has been drinking fluids normally. Denies hx of cellulitis. Pt denies nausea, vomiting, diarrhea, fever, chills. There are no other acute medical concerns at this time. Social hx: denies tobacco use, denies EtOH    PCP: BRISEYDA Guajardo    Note written by Robert Thompson, as dictated by Meliton Falk MD 6:14 PM      The history is provided by the patient and a friend. No  was used. Past Medical History:   Diagnosis Date    Anemia     [facility]     H/O bone density study 10/28/2016    Osteopenia    Hx of bone density study 08/09/06    Normal x2    Hypermenorrhea     Hypertension     Leiomyoma of uterus, unspecified     Menopausal syndrome     Osteopenia 2016    Thrombocytopenia (HCC)        Past Surgical History:   Procedure Laterality Date    HX HYSTEROSCOPY  03/1996    w/ D&C    HX OTHER SURGICAL  02/19/10    tumor in bladder removed x2 in 2010    HX OTHER SURGICAL  03/25/10    Liver Biopsy--Cirrhosis    HX TUBAL LIGATION           Family History:   Problem Relation Age of Onset    Diabetes Brother      Type II    Heart Disease Father     Heart Attack Father 64    Hypertension Mother     Osteoporosis Mother      +Hip fracture       Social History     Social History    Marital status:      Spouse name: N/A    Number of children: N/A    Years of education: N/A     Occupational History    Not on file.      Social History Main Topics    Smoking status: Never Smoker    Smokeless tobacco: Never Used    Alcohol use No    Drug use: No    Sexual activity: Not Currently     Partners: Male     Birth control/ protection: None     Other Topics Concern    Not on file     Social History Narrative         ALLERGIES: Review of patient's allergies indicates no known allergies. Review of Systems   Constitutional: Negative for chills and fever. HENT: Negative for congestion and rhinorrhea. Respiratory: Negative for cough and shortness of breath. Cardiovascular: Positive for leg swelling. Negative for chest pain. Gastrointestinal: Negative for abdominal pain, diarrhea, nausea and vomiting. Genitourinary: Negative for difficulty urinating and dysuria. Musculoskeletal: Negative for back pain and neck pain. Skin: Positive for color change. Neurological: Negative for syncope, numbness and headaches. All other systems reviewed and are negative. Vitals:    12/16/17 1750   BP: 132/64   Pulse: 83   Resp: 16   Temp: 98 °F (36.7 °C)   SpO2: 96%   Weight: 113.4 kg (250 lb)   Height: 5' 3\" (1.6 m)            Physical Exam   Constitutional: She is oriented to person, place, and time. She appears well-developed and well-nourished. M obese, NAD, AxOx4, speaking in complete sentences     HENT:   Head: Normocephalic and atraumatic. Nose: Nose normal.   Mouth/Throat: Oropharynx is clear and moist.   Cn intact    No meningeal signs   Eyes: Conjunctivae and EOM are normal. Pupils are equal, round, and reactive to light. Right eye exhibits no discharge. Left eye exhibits no discharge. No scleral icterus. Neck: Normal range of motion. Neck supple. No JVD present. No tracheal deviation present. Cardiovascular: Normal rate, regular rhythm, normal heart sounds and intact distal pulses. Exam reveals no gallop and no friction rub. No murmur heard. Pulmonary/Chest: Effort normal and breath sounds normal. No respiratory distress. She has no wheezes. She has no rales. She exhibits no tenderness. Abdominal: Soft. Bowel sounds are normal. There is no tenderness.  There is no rebound and no guarding. nttp   Genitourinary: No vaginal discharge found. Genitourinary Comments: Pt denies urinary/ vaginal complaints   Musculoskeletal: Normal range of motion. She exhibits no edema, tenderness or deformity. Neurological: She is alert and oriented to person, place, and time. She displays normal reflexes. No cranial nerve deficit. She exhibits normal muscle tone. Coordination normal.   Skin: Skin is warm and dry. No rash noted. No erythema. No pallor. Noted L lower ext exam concerning for expanding cellulitis; pt has distal motor/ CV/ Sensation grossly intact; pictured   Psychiatric: She has a normal mood and affect. Her behavior is normal. Thought content normal.   Nursing note and vitals reviewed. [facility]  ED Course       Procedures        7:11 PM  Pt told of DVT/ vanc running; agrees w/ evaluation for admission;      CONSULT NOTE:  7:21 PM Anmol Lion MD spoke with Dr. Yaritza Lal, Consult for Hospitalist.  Discussed available diagnostic tests and clinical findings. Dr. Yaritza Lal will admit the pt.

## 2017-12-16 NOTE — IP AVS SNAPSHOT
[address] 
[phone] Patient: Sonu Brunner MRN: [id number] TRL:5/62/6950 My Medications STOP taking these medications KEFLEX 500 mg capsule Generic drug:  cephALEXin TAKE these medications as instructed Instructions Each Dose to Equal  
 Morning Noon Evening Bedtime CALCIUM 600 + D 600-125 mg-unit Tab Generic drug:  calcium-cholecalciferol (d3) Your last dose was: Your next dose is: Take 1 Tab by mouth daily. 1 Tab  
    
   
   
   
  
 doxycycline 100 mg capsule Commonly known as:  Daved Pollen Your last dose was: Your next dose is: Take 1 Cap by mouth two (2) times a day for 5 days. 100 mg  
    
   
   
   
  
 * LASIX 20 mg tablet Generic drug:  furosemide Your last dose was: Your next dose is: Take 40 mg by mouth daily. 40 mg  
    
   
   
   
  
 * LASIX 20 mg tablet Generic drug:  furosemide Your last dose was: Your next dose is: Take 20 mg by mouth every evening. 20 mg  
    
   
   
   
  
 metoprolol tartrate 25 mg tablet Commonly known as:  LOPRESSOR Your last dose was: Your next dose is: Take 25 mg by mouth daily. 25 mg  
    
   
   
   
  
 omeprazole 20 mg capsule Commonly known as:  PRILOSEC Your last dose was: Your next dose is: Take 20 mg by mouth daily. 20 mg  
    
   
   
   
  
 * Notice: This list has 2 medication(s) that are the same as other medications prescribed for you. Read the directions carefully, and ask your doctor or other care provider to review them with you. Where to Get Your Medications Information on where to get these meds will be given to you by the nurse or doctor. ! Ask your nurse or doctor about these medications  
  doxycycline 100 mg capsule

## 2017-12-16 NOTE — IP AVS SNAPSHOT
303 [facility] [address] [facility] 
[phone] Patient: Dandre Andersen MRN: [id number] SOJ:[birth year] About your hospitalization You were admitted on:  December 16, 2017 You last received care in the:  [facility] 4M POST SURG ORT 1 You were discharged on:  December 20, 2017 Why you were hospitalized Your primary diagnosis was:  Cellulitis Your diagnoses also included: Thrombocytopenia (Hcc), Dvt (Deep Venous Thrombosis) (Hcc), Liver Cirrhosis Secondary To [place] (Hcc), Esophageal Varices (Hcc) Things You Need To Do (next 8 weeks) Follow up with BRISEYDA Aguillon in 1 week(s) Phone:  [phone] Where:  [facility] [address] [id number] Thursday Jan 18, 2018 New Patient with Aaron Turk MD at  9:00 AM  
Where:  [facility] at [address] [place] [address] Discharge Orders None A check joya indicates which time of day the medication should be taken. My Medications STOP taking these medications KEFLEX 500 mg capsule Generic drug:  cephALEXin TAKE these medications as instructed Instructions Each Dose to Equal  
 Morning Noon Evening Bedtime CALCIUM 600 + D 600-125 mg-unit Tab Generic drug:  calcium-cholecalciferol (d3) Your last dose was: Your next dose is: Take 1 Tab by mouth daily. 1 Tab  
    
   
   
   
  
 doxycycline 100 mg capsule Commonly known as:  Judy Her Your last dose was: Your next dose is: Take 1 Cap by mouth two (2) times a day for 5 days. 100 mg  
    
   
   
   
  
 * LASIX 20 mg tablet Generic drug:  furosemide Your last dose was: Your next dose is: Take 40 mg by mouth daily. 40 mg  
    
   
   
   
  
 * LASIX 20 mg tablet Generic drug:  furosemide Your last dose was: Your next dose is: Take 20 mg by mouth every evening. 20 mg  
    
   
   
   
  
 metoprolol tartrate 25 mg tablet Commonly known as:  LOPRESSOR Your last dose was: Your next dose is: Take 25 mg by mouth daily. 25 mg  
    
   
   
   
  
 omeprazole 20 mg capsule Commonly known as:  PRILOSEC Your last dose was: Your next dose is: Take 20 mg by mouth daily. 20 mg  
    
   
   
   
  
 * Notice: This list has 2 medication(s) that are the same as other medications prescribed for you. Read the directions carefully, and ask your doctor or other care provider to review them with you. Where to Get Your Medications Information on where to get these meds will be given to you by the nurse or doctor. ! Ask your nurse or doctor about these medications  
  doxycycline 100 mg capsule Discharge Instructions HOSPITALIST DISCHARGE INSTRUCTIONS 
NAME: Jim Estrella :  1950 MRN:  [id number] Date/Time:  2017 3:38 PM 
 
ADMIT DATE: 2017 DISCHARGE DATE: 2017 DISCHARGE DIAGNOSIS: 
Redness left leg:  Possible cellulitis, DVT, venous insufficiency. MEDICATIONS: 
 
· It is important that you take the medication exactly as they are prescribed. · Keep your medication in the bottles provided by the pharmacist and keep a list of the medication names, dosages, and times to be taken in your wallet. · Do not take other medications without consulting your doctor. What to do at Home: 1. Check your blood pressure at home twice a day. Call your doctor for blood pressure greater than 150/90 or lower than 110/55 or if you have dizziness or lightheadedness. 2.  Wear compression stockings as tolerated. Recommended diet:  Cardiac Diet Recommended activity: Activity as tolerated If you experience any of the following symptoms then please call your primary care physician or return to the emergency room if you cannot get hold of your doctor: 
Fever, chills, nausea, vomiting, diarrhea, change in mentation, falling, bleeding, shortness of breath, increased redness / pain / swelling of left leg. Follow Up: 
Dr. Pineda Vaughan, PA  you are to call and set up an appointment to see them in 1 week. Dr. Kya Hayden, hematology. Have your hypercoagulable blood work repeated in about 2-3 months (protein S/C and antithrombin III levels). Information obtained by : 
I understand that if any problems occur once I am at home I am to contact my physician. I understand and acknowledge receipt of the instructions indicated above. Physician's or R.N.'s Signature                                                                  Date/Time Patient or Representative Signature                                                          Date/Time [employer] Announcement We are excited to announce that we are making your provider's discharge notes available to you in [employer]. You will see these notes when they are completed and signed by the physician that discharged you from your recent hospital stay. If you have any questions or concerns about any information you see in [employer], please call the Health Information Department where you were seen or reach out to your Primary Care Provider for more information about your plan of care. Introducing [facility]! Dear Zaynab Fisher: Thank you for requesting a [employer] account. Our records indicate that you already have an active [employer] account. You can access your account anytime at [email or web address] Did you know that you can access your hospital and ER discharge instructions at any time in [employer]? You can also review all of your test results from your hospital stay or ER visit. Additional Information If you have questions, please visit the Frequently Asked Questions section of the [employer] website at [email or web address] Remember, [employer] is NOT to be used for urgent needs. For medical emergencies, dial 911. Now available from your iPhone and Android! Unresulted Labs-Please follow up with your PCP about these lab tests Order Current Status FACTOR II  DNA ANALYSIS In process FACTOR V LEIDEN In process CULTURE, BLOOD Preliminary result CULTURE, BLOOD Preliminary result DUPLEX LOWER EXT VENOUS LEFT Preliminary result Providers Seen During Your Hospitalization Provider Specialty Primary office phone Meliton Falk MD Emergency Medicine [phone] Bartolo Steele MD Internal Medicine [phone] Michael Parra MD Internal Medicine [phone] Your Primary Care Physician (PCP) Primary Care Physician Office Phone Office Fax Dean Motas [phone] You are allergic to the following No active allergies Recent Documentation Height Weight BMI OB Status Smoking Status 1.6 m 113.4 kg 44.29 kg/m2 Postmenopausal Never Smoker Emergency Contacts Name Discharge Info Relation Home Work Mobile Alejo Mckeon DISCHARGE CAREGIVER [3] Spouse [3] [phone] Patient Belongings The following personal items are in your possession at time of discharge: 
  Dental Appliances: None  Visual Aid: Glasses, With patient      Home Medications: None   Jewelry: Earrings, Ring, Watch, With patient  Clothing: At bedside    Other Valuables: Eyeglasses, With patient Please provide this summary of care documentation to your next provider. Signatures-by signing, you are acknowledging that this After Visit Summary has been reviewed with you and you have received a copy. Patient Signature:  ____________________________________________________________ Date:  ____________________________________________________________  
  
[facility] Provider Signature:  ____________________________________________________________ Date:  ____________________________________________________________

## 2017-12-17 NOTE — PROGRESS NOTES
TRANSFER - IN REPORT:    Verbal report received from Williams Castillo RN(name) on Portillo Price  being received from ED(unit) for routine progression of care      Report consisted of patients Situation, Background, Assessment and   Recommendations(SBAR). Information from the following report(s) SBAR, ED Summary, MAR, Accordion and Recent Results was reviewed with the receiving nurse. Opportunity for questions and clarification was provided. Assessment completed upon patients arrival to unit and care assumed.      Primary Nurse Do Boone RN and Altagracia Gonzales performed a dual skin assessment on this patient Impairment noted- see wound doc flow sheet  Edmond score is 20    Scattered bruises noted on abdominal.

## 2017-12-17 NOTE — ED NOTES
TRANSFER - OUT REPORT:    Verbal report given to Robbie RN(name) on Liz Underwood  being transferred to 5th floor(unit) for routine progression of care       Report consisted of patients Situation, Background, Assessment and   Recommendations(SBAR). Information from the following report(s) SBAR, Kardex, ED Summary, Procedure Summary, Intake/Output, Recent Results and Cardiac Rhythm NSR was reviewed with the receiving nurse. Lines:   Peripheral IV 12/16/17 Left Antecubital (Active)   Site Assessment Clean, dry, & intact 12/16/2017  6:30 PM   Phlebitis Assessment 0 12/16/2017  6:30 PM   Infiltration Assessment 0 12/16/2017  6:30 PM   Dressing Status Clean, dry, & intact 12/16/2017  6:30 PM   Dressing Type Transparent 12/16/2017  6:30 PM   Hub Color/Line Status Pink 12/16/2017  6:30 PM       Peripheral IV 12/16/17 Right Antecubital (Active)   Site Assessment Clean, dry, & intact 12/16/2017  6:32 PM   Phlebitis Assessment 0 12/16/2017  6:32 PM   Infiltration Assessment 0 12/16/2017  6:32 PM   Dressing Status Clean, dry, & intact 12/16/2017  6:32 PM   Dressing Type Transparent 12/16/2017  6:32 PM   Hub Color/Line Status Pink 12/16/2017  6:32 PM        Opportunity for questions and clarification was provided.       Patient transported with:   Registered Nurse

## 2017-12-17 NOTE — PROGRESS NOTES
[facility] Dosing Services: Antimicrobial Stewardship Progress Note    Consult for antibiotic dosing of Vancomycin by Dr. Hua Bones by Dr. Helene Mccluren  Indication:  SSTI  Day of Therapy - 2     Vancomycin:  LD: 2000mg x1  MD: 1500mg q 24hrs     Dose calculated to approximate a therapeutic trough of 10-15mcg/mL. Plan for level / Adjustment in Therapy:  Order prior to 3rd MD (not done yet, due at 1700, 12/18 )     Unasyn:  Start at 3 gm IV Q6h     Other Antimicrobial  (not dosed by pharmacist)   None    Cultures     12/16: Blood x2- pending  12/16: Urine- pending   Serum Creatinine     Lab Results   Component Value Date/Time    Creatinine 0.91 12/17/2017 02:44 AM       Creatinine Clearance Estimated Creatinine Clearance: 70.7 mL/min (based on Cr of 0.91).      Temp   99.2 °F (37.3 °C)    WBC   Lab Results   Component Value Date/Time    WBC 7.8 12/17/2017 02:44 AM       H/H   Lab Results   Component Value Date/Time    HGB 10.7 12/17/2017 02:44 AM        Platelets   Lab Results   Component Value Date/Time    PLATELET 44 25/71/7223 02:44 AM          Pharmacist: MICHAEL KeeneD Contact information: [phone]

## 2017-12-17 NOTE — CONSULTS
[id number] [facility] Oncology at [facility]  [phone]    Hematology / Oncology Consult    Reason for Visit:   Rafita Rodriguze is a 69 y.o. female who is seen in consultation at the request of Dr. Hossein Flaherty for evaluation of DVT. History of Present Illness:   Ms. Oneida Luna is a 70 y/o female with h/o bladder cancer, ROBBINS cirrhosis admitted for cellulitis and DVT. Although the patient has chronic BLE edema, she had noted LLE redness, pain and swelling which was new and worsening over the past few days. Her PCP started patient on Keflex, but the symptoms worsened. She reported to the ER and ultrasound revealed peroneal DVT. Patient denies any recent surgeries, fractures, long trips or periods of immobilization, hormone replacement therapy, tobacco use. No prior personal h/o or family h/o thrombosis. Regarding ROBBINS cirrhosis, the patient states she was diagnosed several years ago after having an episode of hematemesis, was found to have esophageal varices. At that time, she had also seen a Hematologist in [place] for workup of thrombocytopenia. Patient denies ever having banding for the varices or medication with Propranolol. Labs here reveal PLT range from 44 - 57. She endorses easy bruising, bleeding, but denies current/recent epistaxis, gingival bleeding. No melena/hematochezia, hematuria.      Past Medical History:   Diagnosis Date    Anemia     Cirrhosis ([address]     ROBBINS     H/O bone density study 10/28/2016    Osteopenia    Hx of bone density study 08/09/06    Normal x2    Hypermenorrhea     Hypertension     Leiomyoma of uterus, unspecified     Menopausal syndrome     Osteopenia 2016    Thrombocytopenia ([address]     Transitional cell bladder cancer ([address]     Has been cleared by urology      Past Surgical History:   Procedure Laterality Date    HX HYSTEROSCOPY  03/1996    w/ D&C    HX OTHER SURGICAL  02/19/10    tumor in bladder removed x2 in 2010    HX OTHER SURGICAL  03/25/10 Liver Biopsy--Cirrhosis    HX TUBAL LIGATION        Social History   Substance Use Topics    Smoking status: Never Smoker    Smokeless tobacco: Never Used    Alcohol use No      Family History   Problem Relation Age of Onset    Diabetes Brother      Type II    Heart Disease Father     Heart Attack Father 64    Hypertension Mother     Osteoporosis Mother      +Hip fracture     Current Facility-Administered Medications   Medication Dose Route Frequency    omeprazole (PRILOSEC) capsule 20 mg  20 mg Oral DAILY    oxyCODONE IR (ROXICODONE) tablet 5 mg  5 mg Oral Q6H PRN    ampicillin-sulbactam (UNASYN) 3 g in 0.9% sodium chloride (MBP/ADV) 100 mL  3 g IntraVENous Q6H    sodium chloride (NS) flush 5-10 mL  5-10 mL IntraVENous Q8H    sodium chloride (NS) flush 5-10 mL  5-10 mL IntraVENous PRN    acetaminophen (TYLENOL) tablet 650 mg  650 mg Oral Q4H PRN    morphine injection 2 mg  2 mg IntraVENous Q4H PRN    naloxone (NARCAN) injection 0.4 mg  0.4 mg IntraVENous PRN    ondansetron (ZOFRAN) injection 4 mg  4 mg IntraVENous Q4H PRN    vancomycin (VANCOCIN) 1500 mg in  ml infusion  1,500 mg IntraVENous Q24H      No Known Allergies     Review of Systems: A complete review of systems was obtained, negative except as described above. Physical Exam:     Visit Vitals    /56 (BP 1 Location: Right arm, BP Patient Position: At rest)    Pulse 84    Temp 97.2 °F (36.2 °C)    Resp 21    Ht 5' 3\" (1.6 m)    Wt 250 lb (113.4 kg)    SpO2 95%    BMI 44.29 kg/m2     ECOG PS: 1  General: No distress, obese  Eyes: PERRLA, anicteric sclerae  HENT: Atraumatic with normal appearance of ears and nose; OP clear  Neck: Supple; no thyromegaly   Lymphatic: No cervical, supraclavicular, or inguinal adenopathy  Respiratory: CTAB, normal respiratory effort  CV: Normal rate, regular rhythm, no murmurs. 2+ pitting edema in BLE.    GI: Soft, nontender, nondistended, no masses, no hepatomegaly, no splenomegaly  MS: Normal gait and station. Digits without clubbing or cyanosis. Skin: No ecchymoses, or petechiae. Normal temperature, turgor, and texture. LLE with moderate to severe erythema from ankle to knee as well as associated TTP. Neuro/Psych: Alert, oriented, appropriate affect, normal judgment/insight      Results:     Lab Results   Component Value Date/Time    WBC 7.8 12/17/2017 02:44 AM    HGB 10.7 12/17/2017 02:44 AM    HCT 32.5 12/17/2017 02:44 AM    PLATELET 44 14/29/3677 02:44 AM    MCV 96.2 12/17/2017 02:44 AM    ABS. NEUTROPHILS 5.6 12/17/2017 02:44 AM     Lab Results   Component Value Date/Time    Sodium 141 12/17/2017 02:44 AM    Potassium 3.6 12/17/2017 02:44 AM    Chloride 109 12/17/2017 02:44 AM    CO2 25 12/17/2017 02:44 AM    Glucose 121 12/17/2017 02:44 AM    BUN 17 12/17/2017 02:44 AM    Creatinine 0.91 12/17/2017 02:44 AM    GFR est AA >60 12/17/2017 02:44 AM    GFR est non-AA >60 12/17/2017 02:44 AM    Calcium 8.1 12/17/2017 02:44 AM     Lab Results   Component Value Date/Time    Bilirubin, total 2.7 12/16/2017 06:31 PM    ALT (SGPT) 40 12/16/2017 06:31 PM    AST (SGOT) 72 12/16/2017 06:31 PM    Alk. phosphatase 137 12/16/2017 06:31 PM    Protein, total 6.8 12/16/2017 06:31 PM    Albumin 2.7 12/16/2017 06:31 PM    Globulin 4.1 12/16/2017 06:31 PM     12/16/17 Lower extremity doppler:  Deep venous thrombosis noted in the left peroneal vein. Gray  scale and color flow duplex images of the remaining veins in the left   lower extremity demonstrate normal compressibility, spontaneous and augmented flow profiles, and absence of filling defects throughout the   deep and superficial veins in the left lower extremity. CONCLUSION: Left lower extremity venous duplex positive for deep  venous thrombosis or thrombophlebitis. Right common femoral vein is  thrombus free. Assessment and Recommendations:   Sonu Brunner is a 69 y.o. female with ROBBINS cirrhosis admitted with LLE DVT.     1. LLE DVT: Patients with cirrhosis have concomitant risks of bleeding and clotting. I do not feel hypercoag workup is warranted in this patient who has her first lifetime DVT at age 69 since testing will be low yield and results will likely not . Given patient's PLT count less than 50 and presence of esophageal varices, I feel that the risks of anticoagulation outweigh the benefits. I do recommend supportive care with elevation and gentle mobilization. Regarding IVC filter, I do not recommend placement in this patient since she has DVT limited to peroneal vein rather than extensive DVT. IVC filters are recommended in patients who require but have contraindications to anticoagulation, with evidence of extensive clot burden which are at risk for fatal PE.   -- I recommend observation and supportive care rather than anticoagulation at this time. No IVC filter recommended. 2. Thrombocytopenia: 2/2 cirrhosis. -- Will check VitB12, Hep B/C profiles     3. Normocytic anemia: Likely anemia of chronic disease. 4. ROBBINS cirrhosis, esophageal varices: Patient requires regular monitoring and care from a Hepatologist such as surveillance EGDs with banding, use of Propranolol and more aggressive management of fluid overload. Pt used to be a part of the ROBBINS program at [address] [place] [address], but she does not wish to go back to [facility]. She is willing to see someone at [facility]. -- Hepatology referral for outpatient evaluation and management. 5. LLE cellulitis: On antibiotics.      Signed By: Tori Simmonds, MD     December 17, 2017

## 2017-12-17 NOTE — PROGRESS NOTES
[facility] Dosing Services: Antimicrobial Stewardship Daily Doc    Consult for antibiotic dosing of Vancomycin by Dr. Rusty Emanuel  Indication:  SSTI  Day of Therapy 1    Vancomycin therapy:  LD: 2000mg x1  MD: 1500mg q 24hrs    Dose calculated to approximate a therapeutic trough of 10-15mcg/mL. Plan for level / Adjustment in Therapy:  Order prior to 3rd MD (not done yet)       Other Antimicrobial   (not dosed by pharmacist) N/a   Cultures 12/16: Blood x2- pending  12/16: Urine- pending   Serum Creatinine Lab Results   Component Value Date/Time    Creatinine 0.99 12/16/2017 06:31 PM         Creatinine Clearance Estimated Creatinine Clearance: 65 mL/min (based on Cr of 0.99). Temp Temp: 98.6 °F (37 °C)       WBC Lab Results   Component Value Date/Time    WBC 9.7 12/16/2017 06:31 PM        H/H Lab Results   Component Value Date/Time    HGB 12.6 12/16/2017 06:31 PM        Platelets    Lab Results   Component Value Date/Time    PLATELET 57 59/55/4533 06:31 PM          Thank you,  Eda Mcgill, Pharm. D.

## 2017-12-17 NOTE — H&P
[address]  [address] [address]  [phone]    Admission History and Physical      NAME:  Deneen Gonzalez   :   1950   MRN:  [id number]     PCP:  BRISEYDA Melgoza     Date/Time:  2017         Subjective:     CHIEF COMPLAINT: \"My leg\"     HISTORY OF PRESENT ILLNESS:     Ms. Grayson Navas is a 69 y.o.  female with PMH of ROBBINS cirrhosis, esophageal varices, known thrombocytopenia (thought to be 2/2 cirrhosis and splenic sequestratiion) admitted for cellulitis + DVT. Per pt, noted some LLE redness and swelling. Was started on Keflex but her PCP but it rapidly worsened in terms of redness, warmth and tenderness prompting her to come to the ED. She was noted to have a peroneal DVT on US. Pt denies tobacco abuse, HRT, immobilization or recent surgeries. Does have a h/o transitional cell CA but has been cleared by urology. No prior h/o DVT/PE     Past Medical History:   Diagnosis Date    Anemia     Cirrhosis ([address]     H/O bone density study 10/28/2016    Osteopenia    Hx of bone density study 06    Normal x2    Hypermenorrhea     Hypertension     Leiomyoma of uterus, unspecified     Menopausal syndrome     Osteopenia 2016    Thrombocytopenia ([address]         Past Surgical History:   Procedure Laterality Date    HX HYSTEROSCOPY  1996    w/ D&C    HX OTHER SURGICAL  02/19/10    tumor in bladder removed x2 in     HX OTHER SURGICAL  03/25/10    Liver Biopsy--Cirrhosis    HX TUBAL LIGATION         Social History   Substance Use Topics    Smoking status: Never Smoker    Smokeless tobacco: Never Used    Alcohol use No        Family History   Problem Relation Age of Onset    Diabetes Brother      Type II    Heart Disease Father     Heart Attack Father 64    Hypertension Mother     Osteoporosis Mother      +Hip fracture        No Known Allergies     Prior to Admission medications    Medication Sig Start Date End Date Taking? Authorizing Provider   cephALEXin (KEFLEX) 500 mg capsule Take 500 mg by mouth four (4) times daily. Yes Phys Other, MD   furosemide (LASIX) 20 mg tablet Take 40 mg by mouth daily. Yes Historical Provider   furosemide (LASIX) 20 mg tablet Take 20 mg by mouth every evening. Yes Historical Provider   omeprazole (PRILOSEC) 20 mg capsule Take 20 mg by mouth daily. 11/25/17  Yes Historical Provider   metoprolol (LOPRESSOR) 25 mg tablet Take 25 mg by mouth daily. 8/16/14  Yes Historical Provider   calcium-cholecalciferol, d3, (CALCIUM 600 + D) 600-125 mg-unit tab Take 1 Tab by mouth daily.    Yes Historical Provider         Review of Systems:  (bold if positive, if negative)    Gen:  Eyes:  ENT:  CVS:  Pulm:  GI:    :    MS:  Skin: see below Psych:  Endo:    Hem:  Renal:    Neuro:     Redness, warmth, swelling, tenderness       Objective:      VITALS:    Vital signs reviewed; most recent are:    Visit Vitals    /64    Pulse 83    Temp 98.7 °F (37.1 °C)    Resp 16    Ht 5' 3\" (1.6 m)    Wt 113.4 kg (250 lb)    SpO2 96%    BMI 44.29 kg/m2     SpO2 Readings from Last 6 Encounters:   12/16/17 96%        No intake or output data in the 24 hours ending 12/16/17 2056         Exam:     Physical Exam:    Gen:  Well-developed, well-nourished, in no acute distress  HEENT:  Pink conjunctivae, PERRL, hearing intact to voice, moist mucous membranes  Neck:  Supple, without masses, thyroid non-tender  Resp:  No accessory muscle use, clear breath sounds without wheezes rales or rhonchi  Card:  No murmurs, normal S1, S2 without thrills, bruits or peripheral edema  Abd:  Soft, non-tender, non-distended, normoactive bowel sounds are present, no palpable organomegaly  Lymph:  No cervical adenopathy  Musc:  No cyanosis or clubbing  Skin:  No rashes or ulcers, skin turgor is good  Neuro:  Cranial nerves 3-12 are grossly intact,  strength is 5/5 bilaterally, dorsi / plantarflexion strength is 5/5 bilaterally, follows commands appropriately  Psych:  Alert with good insight. Oriented to person, place, and time  LLE with diffuse warmth, tenderness, erythema. No evidence of skin breakdown        Labs:    Recent Labs      12/16/17   1831   WBC  9.7   HGB  12.6   HCT  38.0   PLT  57*     Recent Labs      12/16/17   1831   NA  143   K  3.8   CL  107   CO2  26   GLU  126*   BUN  18   CREA  0.99   CA  8.8   ALB  2.7*   SGOT  72*   ALT  40     No components found for: GLPOC  No results for input(s): PH, PCO2, PO2, HCO3, FIO2 in the last 72 hours. No results for input(s): INR in the last 72 hours. No lab exists for component: INREXT         Assessment/Plan:    Cellulitis (12/16/2017) v. Thrombophlebitis   -elevated leg  -start IV antibiotics      DVT (deep vein thrombosis) - distal. In the setting of thrombocytopenia, cirrhosis and known varices would be reluctant to start anticoagulation.  Some literature suggests that no treatment for distal DVT's is required   -check hypercoagulable labs as no clear reason for pt to have DVT   -will ask heme to assist re: need to anticoagulation v. No treatment v. Filter       Liver cirrhosis secondary to [facility]) (12/16/2017)  -will need referal to hepatologist; perhaps can refer to [facility]   -check INR       Esophageal varices ([facility] (12/16/2017)  -monitor; no evidence of bleeding     Surrogate decision maker:      Total time spent with patient: [address] discussed with: Patient, Family, Nursing Staff, Consultant/Specialist and >50% of time spent in counseling and coordination of care    Discussed:  Code Status, Care Plan and D/C Planning    Prophylaxis:  Lovenox    Probable Disposition:  Home w/Family           ___________________________________________________    Attending Physician: Eric Ariza MD

## 2017-12-17 NOTE — PROCEDURES
[facility]  *** FINAL REPORT ***    Name: Leonides Morgan  MRN: [id number]    Outpatient  : 1950  HIS Order #: [id number]  [address] [facility] Visit #: [id number]  Date: 16 Dec 2017    TYPE OF TEST: Peripheral Venous Testing    REASON FOR TEST  Pain in limb, Limb swelling    Left Leg:-  Deep venous thrombosis:           Yes  Proximal extent of thrombus:      Peroneal  Superficial venous thrombosis:    No  Deep venous insufficiency:        No  Superficial venous insufficiency: No      INTERPRETATION/FINDINGS  PROCEDURE:  LEFT LOWER EXTREMITY VENOUS DUPLEX . Evaluation of lower  extremity veins with ultrasound (B-mode imaging, pulsed Doppler, color   Doppler). Includes the common femoral, deep femoral, femoral,  popliteal, posterior tibial, peroneal, and great saphenous veins. Other veins, for example the gastrocnemius and soleal veins, may also  be visualized. FINDINGS: Deep venous thrombosis noted in the left peroneal vein. Gray   scale and color flow duplex images of the remaining veins in the left   lower extremity demonstrate normal compressibility, spontaneous and  augmented flow profiles, and absence of filling defects throughout the   deep and superficial veins in the left lower extremity. CONCLUSION: Left lower extremity venous duplex positive for deep  venous thrombosis or thrombophlebitis. Right common femoral vein is  thrombus free. ADDITIONAL COMMENTS    I have personally reviewed the data relevant to the interpretation of  this  study. TECHNOLOGIST: Catie Pierson  Signed: 2017 07:01 PM    PHYSICIAN: Codi Ritter.  Mann Palacios MD  Signed: 2017 09:08 AM

## 2017-12-17 NOTE — PROGRESS NOTES
Attempted to call Dr. Arthur Toledo x 4. No answers. Left voice mail to call her back for critical lab result. Awaiting for return call.

## 2017-12-17 NOTE — PROGRESS NOTES
Medical Progress Note      NAME: Enid Dyer   :  1950  MRM:  [id number]    Date/Time: 2017  8:19 AM       Assessment / Plan:     Cellulitis (2017) Left Leg:  Possible cellulitis. Has had improvement in pain overnight. Has warmth and erythema. No fever or leukocytosis however. -- elevate leg while in bed   -- continue vanc and add unasyn     DVT (deep vein thrombosis):  Left peroneal vein DVT noted by doppler. Given hx of esophageal varices, thrombocytopenia, and coagulopathy due to underlying liver disease, risk of treating distal DVT with anticoagulation outweighs benefit. -- repeat doppler in 3-4 days   -- await hypercoag labs as sent    Thrombocytopenia / Coagulopathy:  Due to liver disease. No baseline labs available. -- monitor PLTS and INR   -- hold off on heparin for DVT prophylaxis until known stability of PLTS     Liver cirrhosis secondary to ROBBINS ([facility] (2017):     -- will need follow up with hepatology     Esophageal varices ([facility] (2017):  Hx of. No recent hx of bleeding.   -- continue PPI         Subjective:     Chief Complaint:  Feels better. Reports less leg pain. No fever, chills overnight. ROS:  (bold if positive, if negative)              Objective:       Vitals:          Last 24hrs VS reviewed since prior progress note. Most recent are:    Visit Vitals    /59 (BP 1 Location: Right arm, BP Patient Position: At rest)    Pulse 85    Temp 99.2 °F (37.3 °C)    Resp 17    Ht 5' 3\" (1.6 m)    Wt 113.4 kg (250 lb)    SpO2 94%    BMI 44.29 kg/m2     SpO2 Readings from Last 6 Encounters:   17 94%        No intake or output data in the 24 hours ending 17 0819       Exam:     Physical Exam:    Gen:  Well-developed, well-nourished, in no acute distress  HEENT:  Pink conjunctivae, hearing intact to voice, moist mucous membranes  Resp:  No accessory muscle use, clear breath sounds without wheezes rales or rhonchi  Card:   No murmurs, normal S1, S2 without thrills, 1+ peripheral edema  Abd:  Soft, non-tender, non-distended, normoactive bowel sounds are present  Skin:  No rashes, erythema and warmth to LLE  Neuro:   Face symmetric, tongue midline, speech fluent,  strength is 5/5 bilaterally and dorsi / plantarflexion is 5/5 bilaterally, follows commands appropriately  Psych:  Good insight, oriented to person, place and time, alert       Telemetry reviewed:   normal sinus rhythm    Medications Reviewed: (see below)    Lab Data Reviewed: (see below)    ______________________________________________________________________    Medications:     Current Facility-Administered Medications   Medication Dose Route Frequency    omeprazole (PRILOSEC) capsule 20 mg  20 mg Oral DAILY    sodium chloride (NS) flush 5-10 mL  5-10 mL IntraVENous Q8H    sodium chloride (NS) flush 5-10 mL  5-10 mL IntraVENous PRN    acetaminophen (TYLENOL) tablet 650 mg  650 mg Oral Q4H PRN    HYDROcodone-acetaminophen (NORCO) 5-325 mg per tablet 1 Tab  1 Tab Oral Q4H PRN    morphine injection 2 mg  2 mg IntraVENous Q4H PRN    naloxone (NARCAN) injection 0.4 mg  0.4 mg IntraVENous PRN    ondansetron (ZOFRAN) injection 4 mg  4 mg IntraVENous Q4H PRN    0.9% sodium chloride infusion  75 mL/hr IntraVENous CONTINUOUS    vancomycin (VANCOCIN) 1500 mg in  ml infusion  1,500 mg IntraVENous Q24H            Lab Review:     Recent Labs      12/17/17   0244  12/16/17   1831   WBC  7.8  9.7   HGB  10.7*  12.6   HCT  32.5*  38.0   PLT  44*  57*     Recent Labs      12/17/17   0636  12/17/17   0244  12/16/17   1831   NA   --   141  143   K   --   3.6  3.8   CL   --   109*  107   CO2   --   25  26   GLU   --   121*  126*   BUN   --   17  18   CREA   --   0.91  0.99   CA   --   8.1*  8.8   MG   --   1.7   --    ALB   --    --   2.7*   TBILI   --    --   2.7*   SGOT   --    --   72*   ALT   --    --   40   INR  1.6*   --    --      No results found for: GLUCPOC      Total time spent with patient: 30 [address] discussed with: Patient and Family    Discussed:  Care Plan    Prophylaxis:  compression stockings    Disposition:  Home w/Family           ___________________________________________________    Attending Physician: Larry Ansari MD

## 2017-12-17 NOTE — PROGRESS NOTES
[facility]: MED RECONCILIATION    Comments/Recommendations:    Medication reconciliation completed by family in room with medication list brought with them.  Patient prescribed Keflex 500mg four times daily by outpatient provider and started therapy today for SSTI, patient took two doses, with last dose at 1400.  Patient only takes her Calcium+Vit D an average of once monthly when she remembers but did take it yesterday.  Patient takes two 20mg furosemide (40mg) in the morning and one 20mg tab in the evening.  Confirmed NKDA and updated pharmacy. Medications added:     · none    Medications removed:    · none    Medications adjusted:    · Added dosing to furosemide: 40mg every morning and 20mg every evening     Information obtained from: Rx query, family, medication list, patient     Allergies: Review of patient's allergies indicates no known allergies. Prior to Admission Medications:     Medication Documentation Review Audit       Reviewed by Maria Isabel Pastor (Pharmacist) on 12/16/17 at 1400 [facility] Provider Last Dose Status Taking?      calcium-cholecalciferol, d3, (CALCIUM 600 + D) 600-125 mg-unit tab Take 1 Tab by mouth daily. Historical Provider 12/15/2017 AM Active Yes    cephALEXin (KEFLEX) 500 mg capsule Take 500 mg by mouth four (4) times daily. Ernesto Pagan MD 12/16/2017 1400 Active Yes             Med Note (MARIA ISABEL PASTOR   Sat Dec 16, 2017  7:50 PM): Patient started Rx today, has taken 2 doses. furosemide (LASIX) 20 mg tablet Take 40 mg by mouth daily. Historical Provider 12/16/2017 AM Active Yes    furosemide (LASIX) 20 mg tablet Take 20 mg by mouth every evening. Historical Provider 12/15/2017 PM Active Yes    metoprolol (LOPRESSOR) 25 mg tablet Take 25 mg by mouth daily. Historical Provider 12/16/2017 AM Active Yes    omeprazole (PRILOSEC) 20 mg capsule Take 20 mg by mouth daily.  Historical Provider 12/16/2017 AM Active Yes             Med Note (ARLEEN MCCORD   Sat Dec 16, 2017  7:50 PM):                       Thank Christiano Vasquez, PharmD    Contact: [phone]

## 2017-12-17 NOTE — PROGRESS NOTES
Pt c/o right leg pain. Refused to take PRN medications that MD ordered. Pt states that \" I have cirrhosis and I don't want anything go through my liver. I normally take ibuprofen or aleve\". Notified Dr. Lawanda Branham and ordered ibuprofen 400 mg PO once.

## 2017-12-17 NOTE — PROGRESS NOTES
Bedside and Verbal shift change report given to Cira Guerrero RN (oncoming nurse) by Barb Wadsworth RN (offgoing nurse). Report included the following information SBAR, Kardex, MAR, Accordion and Recent Results.

## 2017-12-18 NOTE — PROGRESS NOTES
TRANSFER - OUT REPORT:    Verbal report given to Eugenie Massey RN(name) on Lon Sheth  being transferred to 4th Floor(unit) for routine progression of care       Report consisted of patients Situation, Background, Assessment and   Recommendations(SBAR). Information from the following report(s) SBAR, Kardex, MAR, Accordion and Recent Results was reviewed with the receiving nurse. Lines:   Peripheral IV 12/16/17 Left Antecubital (Active)   Site Assessment Clean, dry, & intact 12/17/2017  7:50 PM   Phlebitis Assessment 0 12/17/2017  7:50 PM   Infiltration Assessment 0 12/17/2017  7:50 PM   Dressing Status Clean, dry, & intact 12/17/2017  7:50 PM   Dressing Type Transparent;Tape 12/17/2017  7:50 PM   Hub Color/Line Status Pink;Capped 12/17/2017  7:50 PM   Action Taken Open ports on tubing capped 12/17/2017  7:50 PM   Alcohol Cap Used Yes 12/17/2017  7:50 PM        Opportunity for questions and clarification was provided.       Patient transported with:   Registered Nurse  Tech

## 2017-12-18 NOTE — PROGRESS NOTES
Primary Nurse Omari Torres RN and Micaela Sow RN performed a dual skin assessment on this patient Impairment noted- see wound doc flow sheet  Edmond score is 19

## 2017-12-18 NOTE — PROGRESS NOTES
Pt c/o being hot. T 99.5. Refused to take tylenol d/t cirrhosis. Requested Ibuprofen. Unable to reach Dr. Emery Gaffney. Left voice mail. Awaiting for return call. Pt's room thermostat is not working and unable to adjust temp in the room. The room is over heated at this time. Engineering dept contacted. Awaiting for return call.

## 2017-12-18 NOTE — PROGRESS NOTES
Medical Progress Note      NAME: Genie Peña   :  1950  MRM:  [id number]    Date/Time: 2017  8:31 AM       Assessment / Plan:     Cellulitis (2017) Left Leg:  Possible cellulitis. No fever or leukocytosis however. Reports pain decreasing and now able to ambulate. -- elevate leg while in bed   -- continue vanc and unasyn     DVT (deep vein thrombosis):  Left peroneal vein DVT noted by doppler. Given hx of esophageal varices, thrombocytopenia, and coagulopathy due to underlying liver disease, risk of treating distal DVT with anticoagulation outweighs benefit. -- repeat doppler in 3-4 days   -- await hypercoag labs as sent    Thrombocytopenia / Coagulopathy:  Due to liver disease. No baseline labs available. -- monitor PLTS and INR   -- hold off on heparin for DVT prophylaxis until known stability of PLTS     Liver cirrhosis secondary to ROBBINS ([facility] (2017):     -- will need follow up with hepatology     Esophageal varices ([facility] (2017):  Hx of. No recent hx of bleeding.   -- continue PPI         Subjective:     Chief Complaint:  Feels better. Less pain in leg. Reports less redness. ROS:  (bold if positive, if negative)              Objective:       Vitals:          Last 24hrs VS reviewed since prior progress note.  Most recent are:    Visit Vitals    /68 (BP 1 Location: Right arm, BP Patient Position: At rest)    Pulse 86    Temp 98.7 °F (37.1 °C)    Resp 16    Ht 5' 3\" (1.6 m)    Wt 113.4 kg (250 lb)    SpO2 95%    BMI 44.29 kg/m2     SpO2 Readings from Last 6 Encounters:   17 95%        No intake or output data in the 24 hours ending 17 0831       Exam:     Physical Exam:    Gen:  Well-developed, well-nourished, in no acute distress  HEENT:  Pink conjunctivae, hearing intact to voice, moist mucous membranes  Resp:  No accessory muscle use, clear breath sounds without wheezes rales or rhonchi  Card:  No murmurs, normal S1, S2 without thrills, 1+ peripheral edema  Abd:  Soft, non-tender, non-distended, normoactive bowel sounds are present  Skin:  erythema and warmth to LLE  Neuro:   Face symmetric, tongue midline, speech fluent,  strength is 5/5 bilaterally, follows commands appropriately  Psych:  Good insight, oriented to person, place and time, alert    Telemetry reviewed:   normal sinus rhythm    Medications Reviewed: (see below)    Lab Data Reviewed: (see below)    ______________________________________________________________________    Medications:     Current Facility-Administered Medications   Medication Dose Route Frequency    omeprazole (PRILOSEC) capsule 20 mg  20 mg Oral DAILY    oxyCODONE IR (ROXICODONE) tablet 5 mg  5 mg Oral Q6H PRN    ampicillin-sulbactam (UNASYN) 3 g in 0.9% sodium chloride (MBP/ADV) 100 mL  3 g IntraVENous Q6H    sodium chloride (NS) flush 5-10 mL  5-10 mL IntraVENous Q8H    sodium chloride (NS) flush 5-10 mL  5-10 mL IntraVENous PRN    acetaminophen (TYLENOL) tablet 650 mg  650 mg Oral Q4H PRN    morphine injection 2 mg  2 mg IntraVENous Q4H PRN    naloxone (NARCAN) injection 0.4 mg  0.4 mg IntraVENous PRN    ondansetron (ZOFRAN) injection 4 mg  4 mg IntraVENous Q4H PRN    vancomycin (VANCOCIN) 1500 mg in  ml infusion  1,500 mg IntraVENous Q24H            Lab Review:     Recent Labs      12/18/17   0236  12/17/17   0244  12/16/17   1831   WBC  5.3  7.8  9.7   HGB  11.0*  10.7*  12.6   HCT  33.4*  32.5*  38.0   PLT  41*  44*  57*     Recent Labs      12/18/17   0236  12/17/17   0636  12/17/17   0244  12/16/17   1831   NA  138   --   141  143   K  3.7   --   3.6  3.8   CL  106   --   109*  107   CO2  25   --   25  26   GLU  91   --   121*  126*   BUN  13   --   17  18   CREA  0.80   --   0.91  0.99   CA  7.9*   --   8.1*  8.8   MG   --    --   1.7   --    ALB  2.3*   --    --   2.7*   TBILI  2.0*   --    --   2.7*   SGOT  59*   --    --   72*   ALT  28   --    --   40   INR  1.5*  1.6*   -- --      No results found for: [address]      Total time spent with patient: [address] discussed with: Patient and Family    Discussed:  Care Plan    Prophylaxis:  compression stockings    Disposition:  Home w/Family           ___________________________________________________    Attending Physician: Christiano Escoto MD

## 2017-12-18 NOTE — PROGRESS NOTES
2059: Lab called to report that patient had low Platellets of 41, Patient is alert oriented and asymptomatic. Dr Hugo Contreras notified, no orders given at this time. Will continue to monitor.

## 2017-12-18 NOTE — PROGRESS NOTES
Engineering dept called back and asked to transfer pt to different room. Charge nurse and house supervisor aware.

## 2017-12-18 NOTE — CDMP QUERY
Patient is noted to have a BMI of 44.29 kg  Please clarify if this patient is:     =>Morbidly obese (BMI > 40)  =>Obese (BMI 30 - 39.9)  =>Overweight (BMI 25 - 29.9)  =>Other explanation of clinical findings  =>Unable to determine (no explanation for clinical findings)    Presentation: 5'3\" 250  lbs = BMI 44.29 kg    REFERENCE:  The [address] [place] [address] has issued a statement indicating that, \"Individuals who are overweight, obese, or morbidly obese are at an increased risk for certain medical conditions when compared to persons of normal weight. Therefore, these conditions are always clinically significant and reportable when documented by the provider. Please clarify and document your clinical opinion in the progress notes and discharge summary, including the definitive and or presumptive diagnosis, (suspected or probable), related to the above clinical findings. Please include clinical findings supporting your diagnosis.      Thank you,  Eden Corral, MSN, [facility] [place] [address]

## 2017-12-18 NOTE — PROGRESS NOTES
Bedside and Verbal shift change report given to [facility] (oncoming nurse) by Rosalina Friday (offgoing nurse). Report included the following information SBAR, Kardex and Recent Results.

## 2017-12-19 NOTE — PROGRESS NOTES
Problem: Falls - Risk of  Goal: *Absence of Falls  Document Jose Armando Fall Risk and appropriate interventions in the flowsheet.    Outcome: Progressing Towards Goal  Fall Risk Interventions:            Medication Interventions: Bed/chair exit alarm, Patient to call before getting OOB, Teach patient to arise slowly    Elimination Interventions: Call light in reach, Bed/chair exit alarm, Patient to call for help with toileting needs, Toileting schedule/hourly rounds

## 2017-12-19 NOTE — PROGRESS NOTES
[facility] Dosing Services: Antimicrobial Stewardship Progress Note     Consult for antibiotic dosing of Vancomycin by Dr. Seun Medina by Dr. Meryle Bolder  Indication:  SSTI  Day of Therapy - 3     Vancomycin:  · Maintenance Dose: 1500mg Q24H  · Dose calculated to approximate a therapeutic trough of 10-15mcg/mL. · WBC & SCr stable, afebrile   · Trough obtained 24 hours post-dose was SUBtherapeutic (4.4 mcg/mL)  · Plan for level / Adjustment in Therapy: Increase to 1500 mg IV Q12H to provided predicted trough of 15 mcg/mL with an AUC of 480      Unasyn:  Current regimen: Unasyn 3 gm IV Q6H  Recommendation: Continue current    Other Antimicrobial   (not dosed by pharmacist) none   Cultures 12/16: Blood x2- ngtd (pending)  12/16: Urine- ng (FINAL)   Serum Creatinine Lab Results   Component Value Date/Time    Creatinine 0.80 12/18/2017 02:36 AM         Creatinine Clearance Estimated Creatinine Clearance: 80.5 mL/min (based on Cr of 0.8). Temp Temp: 98.6 °F (37 °C)       WBC Lab Results   Component Value Date/Time    WBC 5.3 12/18/2017 02:36 AM        H/H Lab Results   Component Value Date/Time    HGB 11.0 12/18/2017 02:36 AM        Platelets    Lab Results   Component Value Date/Time    PLATELET 41 49/01/2980 02:36 AM          Thank you for the consult,  Abelardo FLEMING [facility]

## 2017-12-19 NOTE — PROGRESS NOTES
Bedside and Verbal shift change report given to Eladio Gonzalez RN (oncoming nurse) by Christy Arevalo RN (offgoing nurse). Report included the following information SBAR, Kardex, ED Summary, Intake/Output, MAR, Accordion and Recent Results.

## 2017-12-19 NOTE — PROGRESS NOTES
12/19/2017  12:49 PM  EMR reviewed CM met w/ Pt and  for needs assessment and d/c planning charted demographics verified, Pt lives in 1 story home w/ 2 entry steps. PTA independent w/ ADL's, is employed and drives. Care Management Interventions  PCP Verified by CM: Yes Mynor Andrade NP)  Last Visit to PCP: 12/16/17  Palliative Care Criteria Met (RRAT>21 & CHF Dx)?: No  Mode of Transport at Discharge: Self  Discharge Durable Medical Equipment: No  Physical Therapy Consult: No  Occupational Therapy Consult: No  Speech Therapy Consult: No  Current Support Network: Lives with Spouse (Pt lives w/  [facility]) [phone])  Confirm Follow Up Transport: Self  Plan discussed with Pt/Family/Caregiver: Yes  Discharge Location  Discharge Placement: Home  PCP is BRISEYDA Ma; has Rx coverage fills at [facility]. Pt has not had HH, no current DME. Family will transport home and to all f/u. Current plan is to d/c to home when stable.   CM will follow for final d/c needs/  Claritza Ellis

## 2017-12-19 NOTE — PROGRESS NOTES
Medical Progress Note      NAME: Abbe Moore   :  1950  MRM:  [id number]    Date/Time: 2017  8:20 AM       Assessment / Plan:     Cellulitis (2017) Left Leg:  Possible cellulitis. No fever or leukocytosis however. Reports pain still improving and less redness. Low grade temp overnight to 100.    -- elevate leg while in bed   -- continue vanc and unasyn     DVT (deep vein thrombosis):  Left peroneal vein DVT noted by doppler. Given hx of esophageal varices, thrombocytopenia, and coagulopathy due to underlying liver disease, risk of treating distal DVT with anticoagulation outweighs benefit. -- repeat doppler in AM to ensure stability   -- not on prophylaxis due to varices/thombocytopenia and no SCD with distal DVT present and pains in leg    Thrombocytopenia / Coagulopathy:  Due to liver disease. No baseline labs available. Stable this admission. Appreciate hematology evaluation. -- monitor PLTS and INR    Liver cirrhosis secondary to ROBBINS ([facility] [address] (2017):     -- will need follow up with hepatology     Esophageal varices ([facility] [address] (2017):  Hx of. No recent hx of bleeding.   -- continue PPI         Subjective:     Chief Complaint:  Feels better. Less pain. Had low grade fever overnight. ROS:  (bold if positive, if negative)    Fever/chills          Objective:       Vitals:          Last 24hrs VS reviewed since prior progress note.  Most recent are:    Visit Vitals    /63 (BP 1 Location: Left arm, BP Patient Position: At rest;Head of bed elevated (Comment degrees))    Pulse 90    Temp 97.6 °F (36.4 °C)    Resp 17    Ht 5' 3\" (1.6 m)    Wt 113.4 kg (250 lb)    SpO2 96%    BMI 44.29 kg/m2     SpO2 Readings from Last 6 Encounters:   17 96%        No intake or output data in the 24 hours ending 17 0818       Exam:     Physical Exam:    Gen:  Well-developed, well-nourished, in no acute distress  HEENT:  Pink conjunctivae, hearing intact to voice, moist mucous membranes  Resp:  No accessory muscle use, clear breath sounds without wheezes rales or rhonchi  Card:  No murmurs, normal S1, S2 without thrills, 1+ peripheral edema  Abd:  Soft, non-tender, non-distended, normoactive bowel sounds are present  Skin:  Slight improved erythema and warmth to LLE  Neuro:   Face symmetric, tongue midline, speech fluent,  strength is 5/5 bilaterally, follows commands appropriately  Psych:  Good insight, oriented to person, place and time, alert    Telemetry reviewed:   normal sinus rhythm    Medications Reviewed: (see below)    Lab Data Reviewed: (see below)    ______________________________________________________________________    Medications:     Current Facility-Administered Medications   Medication Dose Route Frequency    potassium chloride SR (KLOR-CON 10) tablet 40 mEq  40 mEq Oral NOW    vancomycin (VANCOCIN) 1500 mg in  ml infusion  1,500 mg IntraVENous Q12H    omeprazole (PRILOSEC) capsule 20 mg  20 mg Oral DAILY    oxyCODONE IR (ROXICODONE) tablet 5 mg  5 mg Oral Q6H PRN    ampicillin-sulbactam (UNASYN) 3 g in 0.9% sodium chloride (MBP/ADV) 100 mL  3 g IntraVENous Q6H    sodium chloride (NS) flush 5-10 mL  5-10 mL IntraVENous Q8H    sodium chloride (NS) flush 5-10 mL  5-10 mL IntraVENous PRN    acetaminophen (TYLENOL) tablet 650 mg  650 mg Oral Q4H PRN    morphine injection 2 mg  2 mg IntraVENous Q4H PRN    naloxone (NARCAN) injection 0.4 mg  0.4 mg IntraVENous PRN    ondansetron (ZOFRAN) injection 4 mg  4 mg IntraVENous Q4H PRN            Lab Review:     Recent Labs      12/19/17   0342  12/18/17   0236  12/17/17   0244   WBC  4.0  5.3  7.8   HGB  10.5*  11.0*  10.7*   HCT  31.5*  33.4*  32.5*   PLT  52*  41*  44*     Recent Labs      12/19/17   0342  12/18/17   0236  12/17/17   0636  12/17/17   0244  12/16/17   1831   NA  139  138   --   141  143   K  3.5  3.7   --   3.6  3.8   CL  107  106   --   109*  107   CO2  24  25   --   25  26   GLU 127*  91   --   121*  126*   BUN  8  13   --   17  18   CREA  0.80  0.80   --   0.91  0.99   CA  7.8*  7.9*   --   8.1*  8.8   MG   --    --    --   1.7   --    ALB  2.0*  2.3*   --    --   2.7*   TBILI  1.5*  2.0*   --    --   2.7*   SGOT  50*  59*   --    --   72*   ALT  30  28   --    --   40   INR   --   1.5*  1.6*   --    --      No results found for: GLUCPOC      Total time spent with patient: 25 Minutes                  Care Plan discussed with: Patient and Family    Discussed:  Care Plan    Prophylaxis:  compression stockings    Disposition:  Home w/Family           ___________________________________________________    Attending Physician: Nataly Parnell MD

## 2017-12-20 NOTE — PROGRESS NOTES
Medical Progress Note      NAME: Dorinda Sampson   :  1950  MRM:  [id number]    Date/Time: 2017  8:20 AM       Assessment / Plan:     Cellulitis (2017) Left Leg:  Possible cellulitis. No fever or leukocytosis however. Reports pain still improving and less redness. However, leg appears to be unchanged. Suspect this may be more from DVT. -- elevate leg while in bed   -- change to doxy po     DVT (deep vein thrombosis):  Left peroneal vein DVT noted by doppler. Given hx of esophageal varices, thrombocytopenia, and coagulopathy due to underlying liver disease, risk of treating distal DVT with anticoagulation outweighs benefit. Has low ATIII and protein S/C, but likely this is confounded by DVT. -- repeat doppler this AM   -- can repeat hypercoag labs as outpatient    Thrombocytopenia / Coagulopathy:  Due to liver disease. No baseline labs available. Stable this admission. Appreciate hematology evaluation. -- monitor PLTS and INR    Liver cirrhosis secondary to ROBBINS ([facility] (2017):     -- will need follow up with hepatology     Esophageal varices ([facility] (2017):  Hx of. No recent hx of bleeding.   -- continue PPI         Subjective:     Chief Complaint:  Feels better. Leg feels better and patient is able to ambulate w/o pain. ROS:  (bold if positive, if negative)              Objective:       Vitals:          Last 24hrs VS reviewed since prior progress note.  Most recent are:    Visit Vitals    /64 (BP 1 Location: Left arm, BP Patient Position: At rest)    Pulse 99    Temp 98.7 °F (37.1 °C)    Resp 17    Ht 5' 3\" (1.6 m)    Wt 113.4 kg (250 lb)    SpO2 95%    BMI 44.29 kg/m2     SpO2 Readings from Last 6 Encounters:   17 95%        No intake or output data in the 24 hours ending 17 0741       Exam:     Physical Exam:    Gen:  Well-developed, well-nourished, in no acute distress  HEENT:  Pink conjunctivae, hearing intact to voice, moist mucous membranes  Resp:  No accessory muscle use, clear breath sounds without wheezes rales or rhonchi  Card:  No murmurs, normal S1, S2 without thrills, 1+ peripheral edema  Abd:  Soft, non-tender, non-distended, normoactive bowel sounds are present  Skin:  Unchanged erythema and warmth to LLE  Neuro:   Face symmetric, tongue midline, speech fluent,  strength is 5/5 bilaterally, follows commands appropriately  Psych:  Good insight, oriented to person, place and time, alert    Medications Reviewed: (see below)    Lab Data Reviewed: (see below)    ______________________________________________________________________    Medications:     Current Facility-Administered Medications   Medication Dose Route Frequency    furosemide (LASIX) tablet 40 mg  40 mg Oral DAILY    furosemide (LASIX) tablet 20 mg  20 mg Oral QPM    vancomycin (VANCOCIN) 1500 mg in  ml infusion  1,500 mg IntraVENous Q12H    omeprazole (PRILOSEC) capsule 20 mg  20 mg Oral DAILY    oxyCODONE IR (ROXICODONE) tablet 5 mg  5 mg Oral Q6H PRN    ampicillin-sulbactam (UNASYN) 3 g in 0.9% sodium chloride (MBP/ADV) 100 mL  3 g IntraVENous Q6H    sodium chloride (NS) flush 5-10 mL  5-10 mL IntraVENous Q8H    sodium chloride (NS) flush 5-10 mL  5-10 mL IntraVENous PRN    acetaminophen (TYLENOL) tablet 650 mg  650 mg Oral Q4H PRN    morphine injection 2 mg  2 mg IntraVENous Q4H PRN    naloxone (NARCAN) injection 0.4 mg  0.4 mg IntraVENous PRN    ondansetron (ZOFRAN) injection 4 mg  4 mg IntraVENous Q4H PRN            Lab Review:     Recent Labs      12/20/17   0332 12/19/17   0342  12/18/17   0236   WBC  3.4*  4.0  5.3   HGB  10.6*  10.5*  11.0*   HCT  31.6*  31.5*  33.4*   PLT  59*  52*  41*     Recent Labs      12/20/17   0332  12/19/17   0342  12/18/17   0236   NA  140  139  138   K  3.7  3.5  3.7   CL  107  107  106   CO2  25  24  25   GLU  85  127*  91   BUN  6  8  13   CREA  0.74  0.80  0.80   CA  8.0*  7.8*  7.9*   ALB   --   2.0*  2.3* TBILI   --   1.5*  2.0*   SGOT   --   50*  59*   ALT   --   30  28   INR   --    --   1.5*     No results found for: [address] discussed with: Patient and Family    Discussed:  Care Plan    Prophylaxis:  compression stockings    Disposition:  Home w/Family           ___________________________________________________    Attending Physician: Rafa Solis MD

## 2017-12-20 NOTE — PROGRESS NOTES
Script for Doxycycline called into patients' pharmacy. Nurse handed patient a copy of discharge instructions which have been read and explained to her with her daughter at her side.  Verbalized understanding

## 2017-12-20 NOTE — DISCHARGE INSTRUCTIONS
HOSPITALIST DISCHARGE INSTRUCTIONS  NAME: Abbe Moore   :  1950   MRN:  [id number]     Date/Time:  2017 3:38 PM    ADMIT DATE: 2017     DISCHARGE DATE: 2017     DISCHARGE DIAGNOSIS:  Redness left leg:  Possible cellulitis, DVT, venous insufficiency. MEDICATIONS:    · It is important that you take the medication exactly as they are prescribed. · Keep your medication in the bottles provided by the pharmacist and keep a list of the medication names, dosages, and times to be taken in your wallet. · Do not take other medications without consulting your doctor. What to do at Home:  1. Check your blood pressure at home twice a day. Call your doctor for blood pressure greater than 150/90 or lower than 110/55 or if you have dizziness or lightheadedness. 2.  Wear compression stockings as tolerated. Recommended diet:  Cardiac Diet    Recommended activity: Activity as tolerated    If you experience any of the following symptoms then please call your primary care physician or return to the emergency room if you cannot get hold of your doctor:  Fever, chills, nausea, vomiting, diarrhea, change in mentation, falling, bleeding, shortness of breath, increased redness / pain / swelling of left leg. Follow Up:  Dr. Angela Buckley, PA  you are to call and set up an appointment to see them in 1 week. Dr. Santi Manning, hematology. Have your hypercoagulable blood work repeated in about 2-3 months (protein S/C and antithrombin III levels). Information obtained by :  I understand that if any problems occur once I am at home I am to contact my physician. I understand and acknowledge receipt of the instructions indicated above.                                                                                                                                            Physician's or R.N.'s Signature                                                                  Date/Time Patient or Representative Signature                                                          Date/Time

## 2017-12-20 NOTE — PROGRESS NOTES
Bedside and Verbal shift change report given to Edwar Allison RN (oncoming nurse) by Cain Enriquez RN (offgoing nurse). Report included the following information SBAR, Kardex, ED Summary, Procedure Summary, Intake/Output, MAR and Recent Results.

## 2017-12-20 NOTE — PROGRESS NOTES
Bedside and Verbal shift change report given to Lashaun Pelayo RN (oncoming nurse) by Jevon Tamayo RN (offgoing nurse). Report included the following information SBAR, Kardex, ED Summary, Intake/Output, MAR, Accordion and Recent Results.

## 2017-12-20 NOTE — PROGRESS NOTES
Problem: Falls - Risk of  Goal: *Absence of Falls  Document Jose Armando Fall Risk and appropriate interventions in the flowsheet.    Outcome: Progressing Towards Goal  Fall Risk Interventions:       Mentation Interventions: Bed/chair exit alarm    Medication Interventions: Bed/chair exit alarm, Patient to call before getting OOB, Teach patient to arise slowly    Elimination Interventions: Call light in reach

## 2017-12-20 NOTE — DISCHARGE SUMMARY
Physician Discharge Summary     Patient ID:  Deneen Gonzalez  [id number]  69 y.o.  1950    Admit date: 12/16/2017    Discharge date and time: 12/20/2017    Admission Diagnoses: Cellulitis  Cellulitis    Discharge Diagnoses:    Principal Problem:    Cellulitis (12/16/2017)    Active Problems: Thrombocytopenia ([facility] (12/16/2017)      DVT (deep venous thrombosis) ([facility] (12/16/2017)      Liver cirrhosis secondary to ROBBINS ([facility] (12/16/2017)      Esophageal varices ([facility] (12/16/2017)           Hospital Course:   Cellulitis (12/16/2017) Left Leg:  Patient admitted for redness of LLE. Thought to be from distal DVT and possible cellulitis. No fever or leukocytosis however. Started on Unasyn and vanc. Reports pain still improving and less redness. However, leg appears to be unchanged. Will finish course of doxycycline as outpatient. However, suspect redness to leg is multifactorial from possible cellulitis, DVT, and venous insufficiency. Advised to return for increased redness, pain, swelling, or fever. Advised to consider dermatology evaluation as well.      DVT (deep vein thrombosis):  Left peroneal vein DVT noted by doppler. Given hx of esophageal varices, thrombocytopenia, and coagulopathy due to underlying liver disease, risk of treating distal DVT with anticoagulation outweighs benefit. Has low ATIII and protein S/C, but likely this is confounded by DVT and needs to be repeated as outpatient. Repeat doppler was without DVT. Patient without symptoms of chest pain, sob, or hemoptysis.     Thrombocytopenia / Coagulopathy:  Due to liver disease. No baseline labs available. Stable this admission. Appreciate hematology evaluation.   Liver cirrhosis secondary to ROBBINS ([facility] (12/16/2017):   Will need follow up with hepatology.  Jeremiah Roberto  PCP: BRISEYDA Melgoza     Consults: Hematology/Oncology    Condition of patient at discharge: stable    Discharge Exam:  Please refer to progress note from day of discharge. Disposition: home    Discharge Medications:   Current Discharge Medication List      START taking these medications    Details   doxycycline (MONODOX) 100 mg capsule Take 1 Cap by mouth two (2) times a day for 5 days. Qty: 10 Cap, Refills: 0         CONTINUE these medications which have NOT CHANGED    Details   !! furosemide (LASIX) 20 mg tablet Take 40 mg by mouth daily. !! furosemide (LASIX) 20 mg tablet Take 20 mg by mouth every evening. omeprazole (PRILOSEC) 20 mg capsule Take 20 mg by mouth daily. metoprolol (LOPRESSOR) 25 mg tablet Take 25 mg by mouth daily. calcium-cholecalciferol, d3, (CALCIUM 600 + D) 600-125 mg-unit tab Take 1 Tab by mouth daily. !! - Potential duplicate medications found. Please discuss with provider. STOP taking these medications       cephALEXin (KEFLEX) 500 mg capsule Comments:   Reason for Stopping:               Activity: Activity as tolerated  Diet: Cardiac Diet    Follow-up with BRISEYDA Avina in 1 week. Approximate time spent in patient care on day of discharge: 60 min.     Signed:  Larry Ansari MD  12/20/2017  3:40 PM

## 2017-12-20 NOTE — PROCEDURES
[facility]  *** FINAL REPORT ***    Name: Blake William  MRN: [id number]    Inpatient  : 1950  HIS Order #: [id number]  [id number] [facility] Visit #: [id number]  Date: 20 Dec 2017    TYPE OF TEST: Peripheral Venous Testing    REASON FOR TEST  Pain in limb, Limb swelling, DVT    Left Leg:-  Deep venous thrombosis:           No  Superficial venous thrombosis:    No  Deep venous insufficiency:        No  Superficial venous insufficiency: No      INTERPRETATION/FINDINGS  PROCEDURE:  LEFT LOWER EXTREMITY VENOUS DUPLEX . Evaluation of lower  extremity veins with ultrasound (B-mode imaging, pulsed Doppler, color   Doppler). Includes the common femoral, deep femoral, femoral,  popliteal, posterior tibial, peroneal, and great saphenous veins. Other veins, for example the gastrocnemius and soleal veins, may also  be visualized. FINDINGS: Unable to show compression of the left peroneal veins,  however, based on color flow analysis no obvious signs of thrombus  seen. Gray scale and color flow duplex images of the veins visualized  in the left lower extremity demonstrate normal compressibility,  spontaneous and augmented flow profiles, and absence of filling  defects throughout the deep and superficial veins in the left lower  extremity. INCIDENTAL FINDINGS: Turbulent flow noted in the right common femoral  vein, suggesting an AV fistula with no obvious connection. Alternate  imaging method recommended for further evaluation. CONCLUSION: Left lower extremity venous duplex negative for deep  venous thrombosis or thrombophlebitis in the veins visualized. Prominent lymph node noted in the left groin measuring 2.97 x 1.44 cm. Right common femoral vein is thrombus free. ADDITIONAL COMMENTS    I have personally reviewed the data relevant to the interpretation of  this  study. TECHNOLOGIST: Yariel Beckett RDCS  Signed: 2017 09:57 AM    PHYSICIAN: Cameron Reyes.  Michelle Gonzalez MD  Signed: 2017 12:05 PM

## 2018-01-01 ENCOUNTER — OFFICE VISIT (OUTPATIENT)
Dept: ONCOLOGY | Age: 68
End: 2018-01-01

## 2018-01-01 ENCOUNTER — ANESTHESIA EVENT (OUTPATIENT)
Dept: SURGERY | Age: 68
End: 2018-01-01
Payer: COMMERCIAL

## 2018-01-01 ENCOUNTER — TELEPHONE (OUTPATIENT)
Dept: HEMATOLOGY | Age: 68
End: 2018-01-01

## 2018-01-01 ENCOUNTER — HOSPITAL ENCOUNTER (OUTPATIENT)
Age: 68
Setting detail: OBSERVATION
Discharge: HOME | End: 2018-06-08
Attending: ORTHOPAEDIC SURGERY | Admitting: ORTHOPAEDIC SURGERY
Payer: COMMERCIAL

## 2018-01-01 ENCOUNTER — APPOINTMENT (OUTPATIENT)
Dept: CT IMAGING | Age: 68
DRG: 812 | End: 2018-01-01
Attending: NURSE PRACTITIONER
Payer: COMMERCIAL

## 2018-01-01 ENCOUNTER — TELEPHONE (OUTPATIENT)
Dept: ONCOLOGY | Age: 68
End: 2018-01-01

## 2018-01-01 ENCOUNTER — ANESTHESIA (OUTPATIENT)
Dept: ENDOSCOPY | Age: 68
DRG: 812 | End: 2018-01-01
Payer: COMMERCIAL

## 2018-01-01 ENCOUNTER — HOSPITAL ENCOUNTER (EMERGENCY)
Age: 68
Discharge: HOME | End: 2018-08-27
Attending: STUDENT IN AN ORGANIZED HEALTH CARE EDUCATION/TRAINING PROGRAM
Payer: MEDICARE

## 2018-01-01 ENCOUNTER — HOSPITAL ENCOUNTER (INPATIENT)
Age: 68
LOS: 1 days | Discharge: HOME | DRG: 812 | End: 2018-03-28
Attending: EMERGENCY MEDICINE | Admitting: INTERNAL MEDICINE
Payer: COMMERCIAL

## 2018-01-01 ENCOUNTER — HOSPITAL ENCOUNTER (EMERGENCY)
Age: 68
Discharge: HOME | End: 2018-05-15
Attending: EMERGENCY MEDICINE
Payer: COMMERCIAL

## 2018-01-01 ENCOUNTER — HOSPITAL ENCOUNTER (INPATIENT)
Age: 68
LOS: 6 days | Discharge: HOSPICE | DRG: 682 | End: 2018-09-20
Attending: EMERGENCY MEDICINE | Admitting: INTERNAL MEDICINE
Payer: MEDICARE

## 2018-01-01 ENCOUNTER — APPOINTMENT (OUTPATIENT)
Dept: INTERVENTIONAL RADIOLOGY/VASCULAR | Age: 68
DRG: 682 | End: 2018-01-01
Attending: INTERNAL MEDICINE
Payer: MEDICARE

## 2018-01-01 ENCOUNTER — HOSPITAL ENCOUNTER (OUTPATIENT)
Dept: INFUSION THERAPY | Age: 68
Discharge: HOME | End: 2018-01-26
Payer: COMMERCIAL

## 2018-01-01 ENCOUNTER — APPOINTMENT (OUTPATIENT)
Dept: INFUSION THERAPY | Age: 68
End: 2018-01-01

## 2018-01-01 ENCOUNTER — HOSPITAL ENCOUNTER (OUTPATIENT)
Dept: INFUSION THERAPY | Age: 68
Discharge: HOME | End: 2018-04-20
Payer: COMMERCIAL

## 2018-01-01 ENCOUNTER — APPOINTMENT (OUTPATIENT)
Dept: GENERAL RADIOLOGY | Age: 68
End: 2018-01-01
Attending: PHYSICIAN ASSISTANT
Payer: COMMERCIAL

## 2018-01-01 ENCOUNTER — DOCUMENTATION ONLY (OUTPATIENT)
Dept: HEMATOLOGY | Age: 68
End: 2018-01-01

## 2018-01-01 ENCOUNTER — ANESTHESIA EVENT (OUTPATIENT)
Dept: ENDOSCOPY | Age: 68
DRG: 812 | End: 2018-01-01
Payer: COMMERCIAL

## 2018-01-01 ENCOUNTER — OFFICE VISIT (OUTPATIENT)
Dept: HEMATOLOGY | Age: 68
End: 2018-01-01

## 2018-01-01 ENCOUNTER — HOSPITAL ENCOUNTER (EMERGENCY)
Age: 68
Discharge: HOME | End: 2018-03-25
Attending: EMERGENCY MEDICINE
Payer: COMMERCIAL

## 2018-01-01 ENCOUNTER — APPOINTMENT (OUTPATIENT)
Dept: GENERAL RADIOLOGY | Age: 68
End: 2018-01-01
Attending: ORTHOPAEDIC SURGERY
Payer: COMMERCIAL

## 2018-01-01 ENCOUNTER — APPOINTMENT (OUTPATIENT)
Dept: ULTRASOUND IMAGING | Age: 68
End: 2018-01-01
Attending: STUDENT IN AN ORGANIZED HEALTH CARE EDUCATION/TRAINING PROGRAM
Payer: MEDICARE

## 2018-01-01 ENCOUNTER — ANESTHESIA (OUTPATIENT)
Dept: SURGERY | Age: 68
End: 2018-01-01
Payer: COMMERCIAL

## 2018-01-01 VITALS
SYSTOLIC BLOOD PRESSURE: 129 MMHG | BODY MASS INDEX: 48.83 KG/M2 | WEIGHT: 275.57 LBS | HEIGHT: 63 IN | TEMPERATURE: 98.4 F | HEART RATE: 86 BPM | OXYGEN SATURATION: 96 % | RESPIRATION RATE: 19 BRPM | DIASTOLIC BLOOD PRESSURE: 58 MMHG

## 2018-01-01 VITALS — SYSTOLIC BLOOD PRESSURE: 133 MMHG | HEART RATE: 64 BPM | DIASTOLIC BLOOD PRESSURE: 76 MMHG | TEMPERATURE: 97.2 F

## 2018-01-01 VITALS
HEART RATE: 90 BPM | WEIGHT: 233.8 LBS | OXYGEN SATURATION: 99 % | TEMPERATURE: 96.7 F | BODY MASS INDEX: 41.43 KG/M2 | HEIGHT: 63 IN | SYSTOLIC BLOOD PRESSURE: 124 MMHG | DIASTOLIC BLOOD PRESSURE: 61 MMHG

## 2018-01-01 VITALS
WEIGHT: 254 LBS | DIASTOLIC BLOOD PRESSURE: 68 MMHG | HEART RATE: 94 BPM | BODY MASS INDEX: 43.6 KG/M2 | SYSTOLIC BLOOD PRESSURE: 130 MMHG | RESPIRATION RATE: 18 BRPM | TEMPERATURE: 98 F

## 2018-01-01 VITALS
RESPIRATION RATE: 18 BRPM | WEIGHT: 275.57 LBS | DIASTOLIC BLOOD PRESSURE: 48 MMHG | SYSTOLIC BLOOD PRESSURE: 106 MMHG | BODY MASS INDEX: 47.05 KG/M2 | HEART RATE: 74 BPM | OXYGEN SATURATION: 94 % | TEMPERATURE: 98 F | HEIGHT: 64 IN

## 2018-01-01 VITALS
TEMPERATURE: 97.4 F | HEART RATE: 91 BPM | WEIGHT: 232.2 LBS | HEART RATE: 84 BPM | SYSTOLIC BLOOD PRESSURE: 120 MMHG | SYSTOLIC BLOOD PRESSURE: 123 MMHG | OXYGEN SATURATION: 99 % | HEIGHT: 64 IN | BODY MASS INDEX: 41.14 KG/M2 | BODY MASS INDEX: 38.41 KG/M2 | DIASTOLIC BLOOD PRESSURE: 56 MMHG | RESPIRATION RATE: 18 BRPM | WEIGHT: 225 LBS | OXYGEN SATURATION: 97 % | DIASTOLIC BLOOD PRESSURE: 61 MMHG | TEMPERATURE: 98 F | HEIGHT: 63 IN

## 2018-01-01 VITALS
DIASTOLIC BLOOD PRESSURE: 70 MMHG | HEIGHT: 64 IN | HEART RATE: 100 BPM | OXYGEN SATURATION: 97 % | WEIGHT: 254.6 LBS | SYSTOLIC BLOOD PRESSURE: 138 MMHG | BODY MASS INDEX: 43.46 KG/M2 | TEMPERATURE: 97.1 F

## 2018-01-01 VITALS
OXYGEN SATURATION: 94 % | HEART RATE: 108 BPM | BODY MASS INDEX: 39.87 KG/M2 | HEIGHT: 63 IN | DIASTOLIC BLOOD PRESSURE: 74 MMHG | SYSTOLIC BLOOD PRESSURE: 149 MMHG | RESPIRATION RATE: 16 BRPM | WEIGHT: 225 LBS | TEMPERATURE: 98.2 F

## 2018-01-01 VITALS
WEIGHT: 244.4 LBS | RESPIRATION RATE: 16 BRPM | OXYGEN SATURATION: 96 % | SYSTOLIC BLOOD PRESSURE: 125 MMHG | DIASTOLIC BLOOD PRESSURE: 70 MMHG | TEMPERATURE: 98.4 F | HEIGHT: 64 IN | HEART RATE: 96 BPM | BODY MASS INDEX: 41.73 KG/M2

## 2018-01-01 VITALS
HEART RATE: 101 BPM | TEMPERATURE: 97.4 F | HEIGHT: 64 IN | SYSTOLIC BLOOD PRESSURE: 118 MMHG | RESPIRATION RATE: 16 BRPM | BODY MASS INDEX: 42.18 KG/M2 | DIASTOLIC BLOOD PRESSURE: 53 MMHG | WEIGHT: 247.06 LBS | OXYGEN SATURATION: 95 %

## 2018-01-01 VITALS
OXYGEN SATURATION: 96 % | HEIGHT: 64 IN | TEMPERATURE: 96.4 F | SYSTOLIC BLOOD PRESSURE: 135 MMHG | WEIGHT: 267.8 LBS | DIASTOLIC BLOOD PRESSURE: 74 MMHG | HEART RATE: 91 BPM | BODY MASS INDEX: 45.72 KG/M2

## 2018-01-01 VITALS
OXYGEN SATURATION: 99 % | DIASTOLIC BLOOD PRESSURE: 67 MMHG | TEMPERATURE: 95.7 F | SYSTOLIC BLOOD PRESSURE: 144 MMHG | WEIGHT: 254 LBS | BODY MASS INDEX: 45 KG/M2 | HEART RATE: 67 BPM | HEIGHT: 63 IN

## 2018-01-01 VITALS
RESPIRATION RATE: 17 BRPM | DIASTOLIC BLOOD PRESSURE: 73 MMHG | BODY MASS INDEX: 43.36 KG/M2 | SYSTOLIC BLOOD PRESSURE: 162 MMHG | OXYGEN SATURATION: 96 % | HEIGHT: 64 IN | TEMPERATURE: 97.4 F | HEART RATE: 101 BPM | WEIGHT: 254 LBS

## 2018-01-01 DIAGNOSIS — I27.20 PULMONARY HYPERTENSION (HCC): ICD-10-CM

## 2018-01-01 DIAGNOSIS — R18.8 CIRRHOSIS OF LIVER WITH ASCITES, UNSPECIFIED HEPATIC CIRRHOSIS TYPE (HCC): ICD-10-CM

## 2018-01-01 DIAGNOSIS — D69.6 THROMBOCYTOPENIA (HCC): ICD-10-CM

## 2018-01-01 DIAGNOSIS — R18.8 ASCITES OF LIVER: ICD-10-CM

## 2018-01-01 DIAGNOSIS — I85.10 SECONDARY ESOPHAGEAL VARICES WITHOUT BLEEDING (HCC): ICD-10-CM

## 2018-01-01 DIAGNOSIS — Z51.5 END OF LIFE CARE: ICD-10-CM

## 2018-01-01 DIAGNOSIS — K74.60 LIVER CIRRHOSIS SECONDARY TO NASH (HCC): ICD-10-CM

## 2018-01-01 DIAGNOSIS — D70.9 NEUTROPENIA, UNSPECIFIED TYPE (HCC): ICD-10-CM

## 2018-01-01 DIAGNOSIS — K75.81 LIVER CIRRHOSIS SECONDARY TO NASH (HCC): ICD-10-CM

## 2018-01-01 DIAGNOSIS — E66.01 OBESITY, MORBID (HCC): ICD-10-CM

## 2018-01-01 DIAGNOSIS — N17.9 ACUTE RENAL FAILURE SUPERIMPOSED ON CHRONIC KIDNEY DISEASE, UNSPECIFIED CKD STAGE, UNSPECIFIED ACUTE RENAL FAILURE TYPE (HCC): Primary | ICD-10-CM

## 2018-01-01 DIAGNOSIS — Z51.5 HOSPICE CARE PATIENT: ICD-10-CM

## 2018-01-01 DIAGNOSIS — R18.8 OTHER ASCITES: Primary | ICD-10-CM

## 2018-01-01 DIAGNOSIS — K75.81 NASH (NONALCOHOLIC STEATOHEPATITIS): Primary | ICD-10-CM

## 2018-01-01 DIAGNOSIS — I07.1 TRICUSPID VALVE INSUFFICIENCY, UNSPECIFIED ETIOLOGY: ICD-10-CM

## 2018-01-01 DIAGNOSIS — K75.81 LIVER CIRRHOSIS SECONDARY TO NASH (HCC): Primary | ICD-10-CM

## 2018-01-01 DIAGNOSIS — K74.60 CIRRHOSIS OF LIVER WITH ASCITES, UNSPECIFIED HEPATIC CIRRHOSIS TYPE (HCC): ICD-10-CM

## 2018-01-01 DIAGNOSIS — Z86.718 HISTORY OF DEEP VEIN THROMBOSIS (DVT) OF LOWER EXTREMITY: Primary | ICD-10-CM

## 2018-01-01 DIAGNOSIS — K74.60 CIRRHOSIS OF LIVER WITHOUT ASCITES, UNSPECIFIED HEPATIC CIRRHOSIS TYPE (HCC): ICD-10-CM

## 2018-01-01 DIAGNOSIS — J90 PLEURAL EFFUSION ON LEFT: ICD-10-CM

## 2018-01-01 DIAGNOSIS — T14.8XXA FRACTURE: ICD-10-CM

## 2018-01-01 DIAGNOSIS — D64.9 ANEMIA, UNSPECIFIED TYPE: ICD-10-CM

## 2018-01-01 DIAGNOSIS — N18.9 ACUTE RENAL FAILURE SUPERIMPOSED ON CHRONIC KIDNEY DISEASE, UNSPECIFIED CKD STAGE, UNSPECIFIED ACUTE RENAL FAILURE TYPE (HCC): Primary | ICD-10-CM

## 2018-01-01 DIAGNOSIS — R06.02 SOB (SHORTNESS OF BREATH): Primary | ICD-10-CM

## 2018-01-01 DIAGNOSIS — N17.9 AKI (ACUTE KIDNEY INJURY) (HCC): ICD-10-CM

## 2018-01-01 DIAGNOSIS — N18.5 STAGE 5 CHRONIC KIDNEY DISEASE NOT ON CHRONIC DIALYSIS (HCC): ICD-10-CM

## 2018-01-01 DIAGNOSIS — I50.810 RVF (RIGHT VENTRICULAR FAILURE) (HCC): ICD-10-CM

## 2018-01-01 DIAGNOSIS — K76.82 HEPATIC ENCEPHALOPATHY SYNDROME: Primary | ICD-10-CM

## 2018-01-01 DIAGNOSIS — R10.84 ABDOMINAL PAIN, GENERALIZED: ICD-10-CM

## 2018-01-01 DIAGNOSIS — R06.09 DYSPNEA ON EFFORT: ICD-10-CM

## 2018-01-01 DIAGNOSIS — D64.9 ANEMIA, UNSPECIFIED TYPE: Primary | ICD-10-CM

## 2018-01-01 DIAGNOSIS — G89.18 POST-OP PAIN: Primary | ICD-10-CM

## 2018-01-01 DIAGNOSIS — K75.81 NASH (NONALCOHOLIC STEATOHEPATITIS): ICD-10-CM

## 2018-01-01 DIAGNOSIS — R53.81 PHYSICAL DEBILITY: ICD-10-CM

## 2018-01-01 DIAGNOSIS — D61.818 PANCYTOPENIA (HCC): ICD-10-CM

## 2018-01-01 DIAGNOSIS — D50.9 IRON DEFICIENCY ANEMIA, UNSPECIFIED IRON DEFICIENCY ANEMIA TYPE: ICD-10-CM

## 2018-01-01 DIAGNOSIS — K76.82 HEPATIC ENCEPHALOPATHY: ICD-10-CM

## 2018-01-01 DIAGNOSIS — K76.1 CHRONIC PASSIVE HEPATIC CONGESTION: ICD-10-CM

## 2018-01-01 DIAGNOSIS — Z86.718 HISTORY OF DEEP VEIN THROMBOSIS (DVT) OF LOWER EXTREMITY: ICD-10-CM

## 2018-01-01 DIAGNOSIS — D50.9 IRON DEFICIENCY ANEMIA, UNSPECIFIED IRON DEFICIENCY ANEMIA TYPE: Primary | ICD-10-CM

## 2018-01-01 DIAGNOSIS — D50.0 IRON DEFICIENCY ANEMIA DUE TO CHRONIC BLOOD LOSS: ICD-10-CM

## 2018-01-01 DIAGNOSIS — N18.4 STAGE 4 CHRONIC KIDNEY DISEASE (HCC): ICD-10-CM

## 2018-01-01 DIAGNOSIS — K74.60 LIVER CIRRHOSIS SECONDARY TO NASH (HCC): Primary | ICD-10-CM

## 2018-01-01 DIAGNOSIS — R60.1 ANASARCA: ICD-10-CM

## 2018-01-01 LAB
A1AT PHENOTYP SERPL IFE: NORMAL
A1AT SERPL-MCNC: 128 MG/DL (ref 90–200)
ABO + RH BLD: NORMAL
ACTIN IGG SERPL-ACNC: 17 UNITS (ref 0–19)
AFP L3 MFR SERPL: 9.5 % (ref 0–9.9)
AFP SERPL-MCNC: 6.2 NG/ML (ref 0–8)
ALBUMIN SERPL-MCNC: 2.3 G/DL (ref 3.5–5)
ALBUMIN SERPL-MCNC: 2.4 G/DL (ref 3.5–5)
ALBUMIN SERPL-MCNC: 2.5 G/DL (ref 3.5–5)
ALBUMIN SERPL-MCNC: 2.7 G/DL (ref 3.5–4.8)
ALBUMIN SERPL-MCNC: 2.8 G/DL (ref 3.5–5)
ALBUMIN SERPL-MCNC: 3.3 G/DL (ref 3.5–4.8)
ALBUMIN SERPL-MCNC: 3.3 G/DL (ref 3.5–4.8)
ALBUMIN SERPL-MCNC: 3.5 G/DL (ref 3.5–5)
ALBUMIN SERPL-MCNC: 4.9 G/DL (ref 3.5–5)
ALBUMIN/GLOB SERPL: 0.5 {RATIO} (ref 1.1–2.2)
ALBUMIN/GLOB SERPL: 0.5 {RATIO} (ref 1.1–2.2)
ALBUMIN/GLOB SERPL: 0.6 {RATIO} (ref 1.1–2.2)
ALBUMIN/GLOB SERPL: 0.7 {RATIO} (ref 1.1–2.2)
ALBUMIN/GLOB SERPL: 1.1 {RATIO} (ref 1.1–2.2)
ALBUMIN/GLOB SERPL: 3.1 {RATIO} (ref 1.1–2.2)
ALP SERPL-CCNC: 103 U/L (ref 45–117)
ALP SERPL-CCNC: 147 U/L (ref 45–117)
ALP SERPL-CCNC: 149 U/L (ref 45–117)
ALP SERPL-CCNC: 150 U/L (ref 45–117)
ALP SERPL-CCNC: 156 U/L (ref 45–117)
ALP SERPL-CCNC: 169 U/L (ref 45–117)
ALP SERPL-CCNC: 177 IU/L (ref 39–117)
ALP SERPL-CCNC: 177 IU/L (ref 39–117)
ALP SERPL-CCNC: 186 U/L (ref 45–117)
ALP SERPL-CCNC: 196 IU/L (ref 39–117)
ALP SERPL-CCNC: 67 U/L (ref 45–117)
ALT SERPL-CCNC: 17 U/L (ref 12–78)
ALT SERPL-CCNC: 20 IU/L (ref 0–32)
ALT SERPL-CCNC: 22 IU/L (ref 0–32)
ALT SERPL-CCNC: 26 U/L (ref 12–78)
ALT SERPL-CCNC: 28 U/L (ref 12–78)
ALT SERPL-CCNC: 30 IU/L (ref 0–32)
ALT SERPL-CCNC: 32 U/L (ref 12–78)
ALT SERPL-CCNC: 33 U/L (ref 12–78)
ALT SERPL-CCNC: 33 U/L (ref 12–78)
AMMONIA PLAS-MCNC: 113 UG/DL (ref 19–87)
AMMONIA PLAS-SCNC: 25 UMOL/L
AMMONIA PLAS-SCNC: 34 UMOL/L
AMMONIA PLAS-SCNC: 54 UMOL/L
ANA SER QL: NEGATIVE
ANION GAP SERPL CALC-SCNC: 10 MMOL/L (ref 5–15)
ANION GAP SERPL CALC-SCNC: 12 MMOL/L (ref 5–15)
ANION GAP SERPL CALC-SCNC: 14 MMOL/L (ref 5–15)
ANION GAP SERPL CALC-SCNC: 5 MMOL/L (ref 5–15)
ANION GAP SERPL CALC-SCNC: 6 MMOL/L (ref 5–15)
ANION GAP SERPL CALC-SCNC: 7 MMOL/L (ref 5–15)
ANION GAP SERPL CALC-SCNC: 9 MMOL/L (ref 5–15)
ANION GAP SERPL CALC-SCNC: 9 MMOL/L (ref 5–15)
APPEARANCE FLD: ABNORMAL
APPEARANCE UR: ABNORMAL
APPEARANCE UR: ABNORMAL
APPEARANCE UR: CLEAR
AST SERPL-CCNC: 24 U/L (ref 15–37)
AST SERPL-CCNC: 37 U/L (ref 15–37)
AST SERPL-CCNC: 47 U/L (ref 15–37)
AST SERPL-CCNC: 51 U/L (ref 15–37)
AST SERPL-CCNC: 54 IU/L (ref 0–40)
AST SERPL-CCNC: 55 IU/L (ref 0–40)
AST SERPL-CCNC: 59 U/L (ref 15–37)
AST SERPL-CCNC: 60 U/L (ref 15–37)
AST SERPL-CCNC: 63 IU/L (ref 0–40)
ATRIAL RATE: 101 BPM
ATRIAL RATE: 114 BPM
ATRIAL RATE: 70 BPM
ATRIAL RATE: 83 BPM
BACTERIA SPEC CULT: NORMAL
BACTERIA SPEC CULT: NORMAL
BACTERIA URNS QL MICRO: ABNORMAL /HPF
BACTERIA URNS QL MICRO: NEGATIVE /HPF
BACTERIA URNS QL MICRO: NEGATIVE /HPF
BASOPHILS # BLD: 0 K/UL (ref 0–0.1)
BASOPHILS NFR BLD: 0 % (ref 0–1)
BASOPHILS NFR BLD: 1 % (ref 0–1)
BASOPHILS NFR BLD: 1 % (ref 0–1)
BILIRUB DIRECT SERPL-MCNC: 0.8 MG/DL (ref 0–0.2)
BILIRUB DIRECT SERPL-MCNC: 0.8 MG/DL (ref 0–0.4)
BILIRUB DIRECT SERPL-MCNC: 0.85 MG/DL (ref 0–0.4)
BILIRUB DIRECT SERPL-MCNC: 0.94 MG/DL (ref 0–0.4)
BILIRUB SERPL-MCNC: 2.1 MG/DL (ref 0–1.2)
BILIRUB SERPL-MCNC: 2.2 MG/DL (ref 0.2–1)
BILIRUB SERPL-MCNC: 2.3 MG/DL (ref 0–1.2)
BILIRUB SERPL-MCNC: 2.4 MG/DL (ref 0.2–1)
BILIRUB SERPL-MCNC: 2.8 MG/DL (ref 0.2–1)
BILIRUB SERPL-MCNC: 3.4 MG/DL (ref 0.2–1)
BILIRUB SERPL-MCNC: 4.2 MG/DL (ref 0–1.2)
BILIRUB SERPL-MCNC: 5.4 MG/DL (ref 0.2–1)
BILIRUB SERPL-MCNC: 6.3 MG/DL (ref 0.2–1)
BILIRUB SERPL-MCNC: 6.6 MG/DL (ref 0.2–1)
BILIRUB SERPL-MCNC: 7.9 MG/DL (ref 0.2–1)
BILIRUB UR QL CFM: POSITIVE
BILIRUB UR QL: NEGATIVE
BILIRUB UR QL: NEGATIVE
BLD PROD TYP BPU: NORMAL
BLD PROD TYP BPU: NORMAL
BLOOD GROUP ANTIBODIES SERPL: NORMAL
BNP SERPL-MCNC: 479 PG/ML (ref 0–125)
BPU ID: NORMAL
BPU ID: NORMAL
BUN SERPL-MCNC: 11 MG/DL (ref 8–27)
BUN SERPL-MCNC: 13 MG/DL (ref 8–27)
BUN SERPL-MCNC: 17 MG/DL (ref 6–20)
BUN SERPL-MCNC: 17 MG/DL (ref 6–20)
BUN SERPL-MCNC: 18 MG/DL (ref 6–20)
BUN SERPL-MCNC: 19 MG/DL (ref 8–27)
BUN SERPL-MCNC: 21 MG/DL (ref 6–20)
BUN SERPL-MCNC: 30 MG/DL (ref 6–20)
BUN SERPL-MCNC: 31 MG/DL (ref 6–20)
BUN SERPL-MCNC: 32 MG/DL (ref 6–20)
BUN SERPL-MCNC: 42 MG/DL (ref 6–20)
BUN SERPL-MCNC: 50 MG/DL (ref 6–20)
BUN SERPL-MCNC: 50 MG/DL (ref 6–20)
BUN SERPL-MCNC: 52 MG/DL (ref 6–20)
BUN SERPL-MCNC: 54 MG/DL (ref 6–20)
BUN SERPL-MCNC: 55 MG/DL (ref 6–20)
BUN SERPL-MCNC: 57 MG/DL (ref 6–20)
BUN/CREAT SERPL: 12 (ref 12–28)
BUN/CREAT SERPL: 14 (ref 12–28)
BUN/CREAT SERPL: 16 (ref 12–20)
BUN/CREAT SERPL: 18 (ref 12–20)
BUN/CREAT SERPL: 20 (ref 12–20)
BUN/CREAT SERPL: 20 (ref 12–20)
BUN/CREAT SERPL: 20 (ref 12–28)
BUN/CREAT SERPL: 22 (ref 12–20)
BUN/CREAT SERPL: 23 (ref 12–20)
BUN/CREAT SERPL: 23 (ref 12–20)
C-ANCA TITR SER IF: NORMAL TITER
CALCIUM SERPL-MCNC: 10 MG/DL (ref 8.5–10.1)
CALCIUM SERPL-MCNC: 10.1 MG/DL (ref 8.5–10.1)
CALCIUM SERPL-MCNC: 10.4 MG/DL (ref 8.5–10.1)
CALCIUM SERPL-MCNC: 8.3 MG/DL (ref 8.5–10.1)
CALCIUM SERPL-MCNC: 8.3 MG/DL (ref 8.5–10.1)
CALCIUM SERPL-MCNC: 8.5 MG/DL (ref 8.5–10.1)
CALCIUM SERPL-MCNC: 8.6 MG/DL (ref 8.5–10.1)
CALCIUM SERPL-MCNC: 8.9 MG/DL (ref 8.5–10.1)
CALCIUM SERPL-MCNC: 9 MG/DL (ref 8.5–10.1)
CALCIUM SERPL-MCNC: 9.2 MG/DL (ref 8.7–10.3)
CALCIUM SERPL-MCNC: 9.3 MG/DL (ref 8.7–10.3)
CALCIUM SERPL-MCNC: 9.5 MG/DL (ref 8.5–10.1)
CALCIUM SERPL-MCNC: 9.7 MG/DL (ref 8.5–10.1)
CALCIUM SERPL-MCNC: 9.8 MG/DL (ref 8.5–10.1)
CALCIUM SERPL-MCNC: 9.8 MG/DL (ref 8.7–10.3)
CALCULATED P AXIS, ECG09: 38 DEGREES
CALCULATED P AXIS, ECG09: 40 DEGREES
CALCULATED P AXIS, ECG09: 52 DEGREES
CALCULATED P AXIS, ECG09: 59 DEGREES
CALCULATED R AXIS, ECG10: -15 DEGREES
CALCULATED R AXIS, ECG10: -17 DEGREES
CALCULATED R AXIS, ECG10: 20 DEGREES
CALCULATED R AXIS, ECG10: 42 DEGREES
CALCULATED T AXIS, ECG11: 19 DEGREES
CALCULATED T AXIS, ECG11: 24 DEGREES
CALCULATED T AXIS, ECG11: 34 DEGREES
CALCULATED T AXIS, ECG11: 44 DEGREES
CHLORIDE SERPL-SCNC: 100 MMOL/L (ref 97–108)
CHLORIDE SERPL-SCNC: 101 MMOL/L (ref 96–106)
CHLORIDE SERPL-SCNC: 102 MMOL/L (ref 96–106)
CHLORIDE SERPL-SCNC: 102 MMOL/L (ref 97–108)
CHLORIDE SERPL-SCNC: 103 MMOL/L (ref 97–108)
CHLORIDE SERPL-SCNC: 104 MMOL/L (ref 97–108)
CHLORIDE SERPL-SCNC: 105 MMOL/L (ref 97–108)
CHLORIDE SERPL-SCNC: 105 MMOL/L (ref 97–108)
CHLORIDE SERPL-SCNC: 106 MMOL/L (ref 96–106)
CHLORIDE SERPL-SCNC: 106 MMOL/L (ref 97–108)
CHLORIDE SERPL-SCNC: 107 MMOL/L (ref 97–108)
CHLORIDE SERPL-SCNC: 107 MMOL/L (ref 97–108)
CHLORIDE SERPL-SCNC: 108 MMOL/L (ref 97–108)
CO2 SERPL-SCNC: 19 MMOL/L (ref 21–32)
CO2 SERPL-SCNC: 20 MMOL/L (ref 21–32)
CO2 SERPL-SCNC: 20 MMOL/L (ref 21–32)
CO2 SERPL-SCNC: 22 MMOL/L (ref 20–29)
CO2 SERPL-SCNC: 22 MMOL/L (ref 21–32)
CO2 SERPL-SCNC: 23 MMOL/L (ref 21–32)
CO2 SERPL-SCNC: 24 MMOL/L (ref 18–29)
CO2 SERPL-SCNC: 24 MMOL/L (ref 21–32)
CO2 SERPL-SCNC: 26 MMOL/L (ref 21–32)
CO2 SERPL-SCNC: 26 MMOL/L (ref 21–32)
CO2 SERPL-SCNC: 27 MMOL/L (ref 18–29)
CO2 SERPL-SCNC: 27 MMOL/L (ref 21–32)
CO2 SERPL-SCNC: 28 MMOL/L (ref 21–32)
COLOR FLD: ABNORMAL
COLOR UR: ABNORMAL
COLOR UR: ABNORMAL
COLOR UR: NORMAL
COMMENT, HOLDF: NORMAL
CREAT SERPL-MCNC: 0.78 MG/DL (ref 0.57–1)
CREAT SERPL-MCNC: 0.95 MG/DL (ref 0.55–1.02)
CREAT SERPL-MCNC: 0.95 MG/DL (ref 0.57–1)
CREAT SERPL-MCNC: 0.96 MG/DL (ref 0.55–1.02)
CREAT SERPL-MCNC: 0.97 MG/DL (ref 0.55–1.02)
CREAT SERPL-MCNC: 1 MG/DL (ref 0.55–1.02)
CREAT SERPL-MCNC: 1.11 MG/DL (ref 0.57–1)
CREAT SERPL-MCNC: 1.33 MG/DL (ref 0.55–1.02)
CREAT SERPL-MCNC: 1.39 MG/DL (ref 0.55–1.02)
CREAT SERPL-MCNC: 1.48 MG/DL (ref 0.55–1.02)
CREAT SERPL-MCNC: 2.32 MG/DL (ref 0.55–1.02)
CREAT SERPL-MCNC: 2.82 MG/DL (ref 0.55–1.02)
CREAT SERPL-MCNC: 3.04 MG/DL (ref 0.55–1.02)
CREAT SERPL-MCNC: 3.07 MG/DL (ref 0.55–1.02)
CREAT SERPL-MCNC: 3.08 MG/DL (ref 0.55–1.02)
CREAT SERPL-MCNC: 3.09 MG/DL (ref 0.55–1.02)
CREAT SERPL-MCNC: 3.2 MG/DL (ref 0.55–1.02)
CROSSMATCH RESULT,%XM: NORMAL
DIAGNOSIS, 93000: NORMAL
DIFFERENTIAL METHOD BLD: ABNORMAL
EOSINOPHIL # BLD: 0 K/UL (ref 0–0.4)
EOSINOPHIL # BLD: 0 K/UL (ref 0–0.4)
EOSINOPHIL # BLD: 0.1 K/UL (ref 0–0.4)
EOSINOPHIL # BLD: 0.2 K/UL (ref 0–0.4)
EOSINOPHIL # BLD: 0.3 K/UL (ref 0–0.4)
EOSINOPHIL NFR BLD: 0 % (ref 0–7)
EOSINOPHIL NFR BLD: 0 % (ref 0–7)
EOSINOPHIL NFR BLD: 3 % (ref 0–7)
EOSINOPHIL NFR BLD: 4 % (ref 0–7)
EOSINOPHIL NFR BLD: 4 % (ref 0–7)
EOSINOPHIL NFR BLD: 5 % (ref 0–7)
EOSINOPHIL NFR BLD: 6 % (ref 0–7)
EOSINOPHIL NFR BLD: 6 % (ref 0–7)
EPITH CASTS URNS QL MICRO: ABNORMAL /LPF
EPITH CASTS URNS QL MICRO: ABNORMAL /LPF
EPITH CASTS URNS QL MICRO: NORMAL /LPF
ERYTHROCYTE [DISTWIDTH] IN BLOOD BY AUTOMATED COUNT: 14.9 % (ref 11.5–14.5)
ERYTHROCYTE [DISTWIDTH] IN BLOOD BY AUTOMATED COUNT: 15.3 % (ref 11.5–14.5)
ERYTHROCYTE [DISTWIDTH] IN BLOOD BY AUTOMATED COUNT: 15.7 % (ref 11.5–14.5)
ERYTHROCYTE [DISTWIDTH] IN BLOOD BY AUTOMATED COUNT: 15.7 % (ref 11.5–14.5)
ERYTHROCYTE [DISTWIDTH] IN BLOOD BY AUTOMATED COUNT: 15.7 % (ref 12.3–15.4)
ERYTHROCYTE [DISTWIDTH] IN BLOOD BY AUTOMATED COUNT: 17.5 % (ref 11.5–14.5)
ERYTHROCYTE [DISTWIDTH] IN BLOOD BY AUTOMATED COUNT: 18.5 % (ref 11.5–14.5)
ERYTHROCYTE [DISTWIDTH] IN BLOOD BY AUTOMATED COUNT: 18.5 % (ref 11.5–14.5)
ERYTHROCYTE [DISTWIDTH] IN BLOOD BY AUTOMATED COUNT: 18.6 % (ref 11.5–14.5)
ERYTHROCYTE [DISTWIDTH] IN BLOOD BY AUTOMATED COUNT: 18.7 % (ref 11.5–14.5)
ERYTHROCYTE [DISTWIDTH] IN BLOOD BY AUTOMATED COUNT: 18.8 % (ref 11.5–14.5)
ERYTHROCYTE [DISTWIDTH] IN BLOOD BY AUTOMATED COUNT: 19.4 % (ref 12.3–15.4)
ERYTHROCYTE [DISTWIDTH] IN BLOOD BY AUTOMATED COUNT: 20.2 % (ref 11.5–14.5)
ERYTHROCYTE [DISTWIDTH] IN BLOOD BY AUTOMATED COUNT: 22.6 % (ref 12.3–15.4)
FERRITIN SERPL-MCNC: 20 NG/ML (ref 8–252)
FOLATE SERPL-MCNC: 12.7 NG/ML (ref 5–21)
GFR SERPLBLD CREATININE-BSD FMLA CKD-EPI: 61 ML/MIN/1.73
GFR SERPLBLD CREATININE-BSD FMLA CKD-EPI: 70 ML/MIN/1.73
GFR SERPLBLD CREATININE-BSD FMLA CKD-EPI: 77 ML/MIN/1.73
GFR SERPLBLD CREATININE-BSD FMLA CKD-EPI: 89 ML/MIN/1.73
GLOBULIN SER CALC-MCNC: 1.6 G/DL (ref 2–4)
GLOBULIN SER CALC-MCNC: 3.1 G/DL (ref 2–4)
GLOBULIN SER CALC-MCNC: 3.8 G/DL (ref 2–4)
GLOBULIN SER CALC-MCNC: 4.4 G/DL (ref 2–4)
GLOBULIN SER CALC-MCNC: 4.5 G/DL (ref 2–4)
GLOBULIN SER CALC-MCNC: 4.9 G/DL (ref 2–4)
GLUCOSE SERPL-MCNC: 103 MG/DL (ref 65–100)
GLUCOSE SERPL-MCNC: 107 MG/DL (ref 65–100)
GLUCOSE SERPL-MCNC: 109 MG/DL (ref 65–100)
GLUCOSE SERPL-MCNC: 118 MG/DL (ref 65–100)
GLUCOSE SERPL-MCNC: 127 MG/DL (ref 65–100)
GLUCOSE SERPL-MCNC: 136 MG/DL (ref 65–99)
GLUCOSE SERPL-MCNC: 143 MG/DL (ref 65–100)
GLUCOSE SERPL-MCNC: 144 MG/DL (ref 65–100)
GLUCOSE SERPL-MCNC: 172 MG/DL (ref 65–100)
GLUCOSE SERPL-MCNC: 66 MG/DL (ref 65–99)
GLUCOSE SERPL-MCNC: 93 MG/DL (ref 65–99)
GLUCOSE SERPL-MCNC: 95 MG/DL (ref 65–100)
GLUCOSE SERPL-MCNC: 96 MG/DL (ref 65–100)
GLUCOSE SERPL-MCNC: 96 MG/DL (ref 65–100)
GLUCOSE SERPL-MCNC: 97 MG/DL (ref 65–100)
GLUCOSE SERPL-MCNC: 97 MG/DL (ref 65–100)
GLUCOSE SERPL-MCNC: 99 MG/DL (ref 65–100)
GLUCOSE UR STRIP.AUTO-MCNC: NEGATIVE MG/DL
GRAN CASTS URNS QL MICRO: ABNORMAL /LPF
HAPTOGLOB SERPL-MCNC: 16 MG/DL (ref 30–200)
HCT VFR BLD AUTO: 24.8 % (ref 35–47)
HCT VFR BLD AUTO: 24.8 % (ref 35–47)
HCT VFR BLD AUTO: 25 % (ref 35–47)
HCT VFR BLD AUTO: 25.1 % (ref 35–47)
HCT VFR BLD AUTO: 25.3 % (ref 35–47)
HCT VFR BLD AUTO: 25.6 % (ref 35–47)
HCT VFR BLD AUTO: 28.4 % (ref 35–47)
HCT VFR BLD AUTO: 28.7 % (ref 35–47)
HCT VFR BLD AUTO: 31.2 % (ref 35–47)
HCT VFR BLD AUTO: 31.9 % (ref 34–46.6)
HCT VFR BLD AUTO: 34.2 % (ref 34–46.6)
HCT VFR BLD AUTO: 36 % (ref 35–47)
HCT VFR BLD AUTO: 37.2 % (ref 35–47)
HCT VFR BLD AUTO: 40.6 % (ref 34–46.6)
HEMOCCULT STL QL: NEGATIVE
HGB BLD-MCNC: 10.1 G/DL (ref 11.5–16)
HGB BLD-MCNC: 10.7 G/DL (ref 11.1–15.9)
HGB BLD-MCNC: 10.9 G/DL (ref 11.1–15.9)
HGB BLD-MCNC: 11.3 G/DL (ref 11.5–16)
HGB BLD-MCNC: 12.3 G/DL (ref 11.5–16)
HGB BLD-MCNC: 13.5 G/DL (ref 11.1–15.9)
HGB BLD-MCNC: 7.5 G/DL (ref 11.5–16)
HGB BLD-MCNC: 7.7 G/DL (ref 11.5–16)
HGB BLD-MCNC: 8 G/DL (ref 11.5–16)
HGB BLD-MCNC: 8 G/DL (ref 11.5–16)
HGB BLD-MCNC: 9.2 G/DL (ref 11.5–16)
HGB BLD-MCNC: 9.4 G/DL (ref 11.5–16)
HGB UR QL STRIP: ABNORMAL
HGB UR QL STRIP: NEGATIVE
HGB UR QL STRIP: NEGATIVE
HYALINE CASTS URNS QL MICRO: ABNORMAL /LPF (ref 0–5)
HYALINE CASTS URNS QL MICRO: ABNORMAL /LPF (ref 0–5)
IMM GRANULOCYTES # BLD: 0 K/UL
IMM GRANULOCYTES # BLD: 0 K/UL
IMM GRANULOCYTES # BLD: 0 K/UL (ref 0–0.04)
IMM GRANULOCYTES # BLD: 0.1 K/UL (ref 0–0.04)
IMM GRANULOCYTES NFR BLD AUTO: 0 %
IMM GRANULOCYTES NFR BLD AUTO: 0 %
IMM GRANULOCYTES NFR BLD AUTO: 0 % (ref 0–0.5)
IMM GRANULOCYTES NFR BLD AUTO: 1 % (ref 0–0.5)
IMM GRANULOCYTES NFR BLD AUTO: 1 % (ref 0–0.5)
INR BLD: 1.3 (ref 0.9–1.2)
INR PPP: 1.4 (ref 0.8–1.2)
INR PPP: 1.5 (ref 0.8–1.2)
INR PPP: 1.6 (ref 0.8–1.2)
INR PPP: 1.8 (ref 0.9–1.1)
IRON SATN MFR SERPL: 12 % (ref 20–50)
IRON SERPL-MCNC: 39 UG/DL (ref 35–150)
KETONES UR QL STRIP.AUTO: NEGATIVE MG/DL
LACTATE SERPL-SCNC: 1.7 MMOL/L (ref 0.4–2)
LDH SERPL L TO P-CCNC: 308 U/L (ref 81–246)
LEUKOCYTE ESTERASE UR QL STRIP.AUTO: ABNORMAL
LEUKOCYTE ESTERASE UR QL STRIP.AUTO: NEGATIVE
LEUKOCYTE ESTERASE UR QL STRIP.AUTO: NEGATIVE
LIPASE SERPL-CCNC: 507 U/L (ref 73–393)
LIPASE SERPL-CCNC: 583 U/L (ref 73–393)
LIPASE SERPL-CCNC: 591 U/L (ref 73–393)
LYMPHOCYTES # BLD: 0.6 K/UL (ref 0.8–3.5)
LYMPHOCYTES # BLD: 0.9 K/UL (ref 0.8–3.5)
LYMPHOCYTES # BLD: 1 K/UL (ref 0.8–3.5)
LYMPHOCYTES # BLD: 1 K/UL (ref 0.8–3.5)
LYMPHOCYTES # BLD: 1.1 K/UL (ref 0.8–3.5)
LYMPHOCYTES # BLD: 1.3 K/UL (ref 0.8–3.5)
LYMPHOCYTES # BLD: 1.5 K/UL (ref 0.8–3.5)
LYMPHOCYTES NFR BLD: 12 % (ref 12–49)
LYMPHOCYTES NFR BLD: 17 % (ref 12–49)
LYMPHOCYTES NFR BLD: 17 % (ref 12–49)
LYMPHOCYTES NFR BLD: 18 % (ref 12–49)
LYMPHOCYTES NFR BLD: 19 % (ref 12–49)
LYMPHOCYTES NFR BLD: 20 % (ref 12–49)
LYMPHOCYTES NFR BLD: 22 % (ref 12–49)
LYMPHOCYTES NFR BLD: 30 % (ref 12–49)
LYMPHOCYTES NFR BLD: 34 % (ref 12–49)
LYMPHOCYTES NFR BLD: 6 % (ref 12–49)
LYMPHOCYTES NFR FLD: 13 %
MAGNESIUM SERPL-MCNC: 1.9 MG/DL (ref 1.6–2.4)
MAGNESIUM SERPL-MCNC: 1.9 MG/DL (ref 1.6–2.4)
MAGNESIUM SERPL-MCNC: 2 MG/DL (ref 1.6–2.4)
MAGNESIUM SERPL-MCNC: 2.5 MG/DL (ref 1.6–2.4)
MCH RBC QN AUTO: 28.7 PG (ref 26.6–33)
MCH RBC QN AUTO: 28.7 PG (ref 26–34)
MCH RBC QN AUTO: 28.8 PG (ref 26–34)
MCH RBC QN AUTO: 30.7 PG (ref 26.6–33)
MCH RBC QN AUTO: 31.1 PG (ref 26.6–33)
MCH RBC QN AUTO: 31.2 PG (ref 26–34)
MCH RBC QN AUTO: 31.3 PG (ref 26–34)
MCH RBC QN AUTO: 32.4 PG (ref 26–34)
MCH RBC QN AUTO: 32.8 PG (ref 26–34)
MCH RBC QN AUTO: 32.9 PG (ref 26–34)
MCH RBC QN AUTO: 33.1 PG (ref 26–34)
MCH RBC QN AUTO: 33.5 PG (ref 26–34)
MCHC RBC AUTO-ENTMCNC: 30.1 G/DL (ref 30–36.5)
MCHC RBC AUTO-ENTMCNC: 30.2 G/DL (ref 30–36.5)
MCHC RBC AUTO-ENTMCNC: 30.7 G/DL (ref 30–36.5)
MCHC RBC AUTO-ENTMCNC: 30.8 G/DL (ref 30–36.5)
MCHC RBC AUTO-ENTMCNC: 31.4 G/DL (ref 30–36.5)
MCHC RBC AUTO-ENTMCNC: 31.6 G/DL (ref 30–36.5)
MCHC RBC AUTO-ENTMCNC: 31.9 G/DL (ref 31.5–35.7)
MCHC RBC AUTO-ENTMCNC: 32.3 G/DL (ref 30–36.5)
MCHC RBC AUTO-ENTMCNC: 32.4 G/DL (ref 30–36.5)
MCHC RBC AUTO-ENTMCNC: 32.4 G/DL (ref 30–36.5)
MCHC RBC AUTO-ENTMCNC: 32.8 G/DL (ref 30–36.5)
MCHC RBC AUTO-ENTMCNC: 33.1 G/DL (ref 30–36.5)
MCHC RBC AUTO-ENTMCNC: 33.3 G/DL (ref 31.5–35.7)
MCHC RBC AUTO-ENTMCNC: 33.5 G/DL (ref 31.5–35.7)
MCV RBC AUTO: 101.1 FL (ref 80–99)
MCV RBC AUTO: 101.4 FL (ref 80–99)
MCV RBC AUTO: 102.4 FL (ref 80–99)
MCV RBC AUTO: 103.8 FL (ref 80–99)
MCV RBC AUTO: 90 FL (ref 79–97)
MCV RBC AUTO: 92 FL (ref 79–97)
MCV RBC AUTO: 93 FL (ref 79–97)
MCV RBC AUTO: 93.6 FL (ref 80–99)
MCV RBC AUTO: 93.7 FL (ref 80–99)
MCV RBC AUTO: 95 FL (ref 80–99)
MCV RBC AUTO: 95.5 FL (ref 80–99)
MCV RBC AUTO: 96.3 FL (ref 80–99)
MCV RBC AUTO: 99.2 FL (ref 80–99)
MCV RBC AUTO: 99.7 FL (ref 80–99)
MESOTHL CELL NFR FLD: 20 %
METAMYELOCYTES NFR BLD MANUAL: 1 %
MITOCHONDRIA M2 IGG SER-ACNC: 6.1 UNITS (ref 0–20)
MONOCYTES # BLD: 0.5 K/UL (ref 0–1)
MONOCYTES # BLD: 0.7 K/UL (ref 0–1)
MONOCYTES # BLD: 0.7 K/UL (ref 0–1)
MONOCYTES # BLD: 0.8 K/UL (ref 0–1)
MONOCYTES # BLD: 0.9 K/UL (ref 0–1)
MONOCYTES # BLD: 1 K/UL (ref 0–1)
MONOCYTES # BLD: 1.3 K/UL (ref 0–1)
MONOCYTES # BLD: 1.3 K/UL (ref 0–1)
MONOCYTES NFR BLD: 14 % (ref 5–13)
MONOCYTES NFR BLD: 14 % (ref 5–13)
MONOCYTES NFR BLD: 15 % (ref 5–13)
MONOCYTES NFR BLD: 15 % (ref 5–13)
MONOCYTES NFR BLD: 18 % (ref 5–13)
MONOCYTES NFR BLD: 18 % (ref 5–13)
MONOCYTES NFR BLD: 20 % (ref 5–13)
MONOCYTES NFR BLD: 21 % (ref 5–13)
MONOCYTES NFR BLD: 21 % (ref 5–13)
MONOCYTES NFR BLD: 5 % (ref 5–13)
MONOS+MACROS NFR FLD: 58 %
MORPHOLOGY BLD-IMP: ABNORMAL
MORPHOLOGY BLD-IMP: ABNORMAL
MUCOUS THREADS URNS QL MICRO: ABNORMAL /LPF
MYELOCYTES NFR BLD MANUAL: 2 %
MYELOPEROXIDASE AB SER IA-ACNC: <9 U/ML (ref 0–9)
NEUTROPHILS NFR FLD: 9 %
NEUTS BAND NFR BLD MANUAL: 1 % (ref 0–6)
NEUTS SEG # BLD: 1.5 K/UL (ref 1.8–8)
NEUTS SEG # BLD: 1.6 K/UL (ref 1.8–8)
NEUTS SEG # BLD: 2.3 K/UL (ref 1.8–8)
NEUTS SEG # BLD: 2.9 K/UL (ref 1.8–8)
NEUTS SEG # BLD: 2.9 K/UL (ref 1.8–8)
NEUTS SEG # BLD: 3.3 K/UL (ref 1.8–8)
NEUTS SEG # BLD: 3.6 K/UL (ref 1.8–8)
NEUTS SEG # BLD: 4.6 K/UL (ref 1.8–8)
NEUTS SEG # BLD: 6.1 K/UL (ref 1.8–8)
NEUTS SEG # BLD: 9.3 K/UL (ref 1.8–8)
NEUTS SEG NFR BLD: 38 % (ref 32–75)
NEUTS SEG NFR BLD: 45 % (ref 32–75)
NEUTS SEG NFR BLD: 56 % (ref 32–75)
NEUTS SEG NFR BLD: 57 % (ref 32–75)
NEUTS SEG NFR BLD: 61 % (ref 32–75)
NEUTS SEG NFR BLD: 61 % (ref 32–75)
NEUTS SEG NFR BLD: 63 % (ref 32–75)
NEUTS SEG NFR BLD: 66 % (ref 32–75)
NEUTS SEG NFR BLD: 70 % (ref 32–75)
NEUTS SEG NFR BLD: 85 % (ref 32–75)
NITRITE UR QL STRIP.AUTO: NEGATIVE
NRBC # BLD: 0 K/UL (ref 0–0.01)
NRBC BLD-RTO: 0 PER 100 WBC
NUC CELL # FLD: 304 /CU MM
P-ANCA ATYPICAL TITR SER IF: NORMAL TITER
P-ANCA TITR SER IF: NORMAL TITER
P-R INTERVAL, ECG05: 160 MS
P-R INTERVAL, ECG05: 164 MS
P-R INTERVAL, ECG05: 166 MS
P-R INTERVAL, ECG05: 170 MS
PATH REV BLD -IMP: ABNORMAL
PATH REV BLD -IMP: NORMAL
PH UR STRIP: 5 [PH] (ref 5–8)
PHOSPHATE SERPL-MCNC: 3.2 MG/DL (ref 2.6–4.7)
PLATELET # BLD AUTO: 40 K/UL (ref 150–400)
PLATELET # BLD AUTO: 41 K/UL (ref 150–400)
PLATELET # BLD AUTO: 42 K/UL (ref 150–400)
PLATELET # BLD AUTO: 43 K/UL (ref 150–400)
PLATELET # BLD AUTO: 55 K/UL (ref 150–400)
PLATELET # BLD AUTO: 67 K/UL (ref 150–400)
PLATELET # BLD AUTO: 68 K/UL (ref 150–400)
PLATELET # BLD AUTO: 76 K/UL (ref 150–400)
PLATELET # BLD AUTO: 81 X10E3/UL (ref 150–379)
PLATELET # BLD AUTO: 85 X10E3/UL (ref 150–379)
PLATELET # BLD AUTO: 87 X10E3/UL (ref 150–379)
PLATELET # BLD AUTO: 91 K/UL (ref 150–400)
PLATELET COMMENTS,PCOM: ABNORMAL
PMV BLD AUTO: 11.6 FL (ref 8.9–12.9)
PMV BLD AUTO: 12 FL (ref 8.9–12.9)
PMV BLD AUTO: 12.1 FL (ref 8.9–12.9)
PMV BLD AUTO: 12.4 FL (ref 8.9–12.9)
PMV BLD AUTO: 12.4 FL (ref 8.9–12.9)
PMV BLD AUTO: 12.8 FL (ref 8.9–12.9)
POTASSIUM SERPL-SCNC: 3.8 MMOL/L (ref 3.5–5.2)
POTASSIUM SERPL-SCNC: 3.9 MMOL/L (ref 3.5–5.1)
POTASSIUM SERPL-SCNC: 4 MMOL/L (ref 3.5–5.1)
POTASSIUM SERPL-SCNC: 4.2 MMOL/L (ref 3.5–5.1)
POTASSIUM SERPL-SCNC: 4.3 MMOL/L (ref 3.5–5.1)
POTASSIUM SERPL-SCNC: 4.3 MMOL/L (ref 3.5–5.1)
POTASSIUM SERPL-SCNC: 4.5 MMOL/L (ref 3.5–5.2)
POTASSIUM SERPL-SCNC: 4.6 MMOL/L (ref 3.5–5.2)
POTASSIUM SERPL-SCNC: 4.8 MMOL/L (ref 3.5–5.1)
POTASSIUM SERPL-SCNC: 5.1 MMOL/L (ref 3.5–5.1)
POTASSIUM SERPL-SCNC: 5.2 MMOL/L (ref 3.5–5.1)
POTASSIUM SERPL-SCNC: 5.3 MMOL/L (ref 3.5–5.1)
POTASSIUM SERPL-SCNC: 5.3 MMOL/L (ref 3.5–5.1)
POTASSIUM SERPL-SCNC: 5.4 MMOL/L (ref 3.5–5.1)
POTASSIUM SERPL-SCNC: 5.8 MMOL/L (ref 3.5–5.1)
PROT SERPL-MCNC: 6.3 G/DL (ref 6.4–8.2)
PROT SERPL-MCNC: 6.5 G/DL (ref 6.4–8.2)
PROT SERPL-MCNC: 6.5 G/DL (ref 6–8.5)
PROT SERPL-MCNC: 6.6 G/DL (ref 6.4–8.2)
PROT SERPL-MCNC: 6.8 G/DL (ref 6.4–8.2)
PROT SERPL-MCNC: 6.9 G/DL (ref 6.4–8.2)
PROT SERPL-MCNC: 6.9 G/DL (ref 6–8.5)
PROT SERPL-MCNC: 7.2 G/DL (ref 6.4–8.2)
PROT SERPL-MCNC: 7.3 G/DL (ref 6.4–8.2)
PROT SERPL-MCNC: 7.5 G/DL (ref 6–8.5)
PROT SERPL-MCNC: 7.7 G/DL (ref 6.4–8.2)
PROT UR STRIP-MCNC: NEGATIVE MG/DL
PROTEINASE3 AB SER IA-ACNC: <3.5 U/ML (ref 0–3.5)
PROTHROMBIN TIME: 14.7 SEC (ref 9.1–12)
PROTHROMBIN TIME: 15.1 SEC (ref 9.1–12)
PROTHROMBIN TIME: 16.3 SEC (ref 9.1–12)
PROTHROMBIN TIME: 18.5 SEC (ref 9–11.1)
Q-T INTERVAL, ECG07: 344 MS
Q-T INTERVAL, ECG07: 346 MS
Q-T INTERVAL, ECG07: 404 MS
Q-T INTERVAL, ECG07: 428 MS
QRS DURATION, ECG06: 72 MS
QRS DURATION, ECG06: 74 MS
QRS DURATION, ECG06: 76 MS
QRS DURATION, ECG06: 86 MS
QTC CALCULATION (BEZET), ECG08: 448 MS
QTC CALCULATION (BEZET), ECG08: 462 MS
QTC CALCULATION (BEZET), ECG08: 474 MS
QTC CALCULATION (BEZET), ECG08: 474 MS
RBC # BLD AUTO: 2.39 M/UL (ref 3.8–5.2)
RBC # BLD AUTO: 2.47 M/UL (ref 3.8–5.2)
RBC # BLD AUTO: 2.61 M/UL (ref 3.8–5.2)
RBC # BLD AUTO: 2.67 M/UL (ref 3.8–5.2)
RBC # BLD AUTO: 2.68 M/UL (ref 3.8–5.2)
RBC # BLD AUTO: 2.68 M/UL (ref 3.8–5.2)
RBC # BLD AUTO: 2.8 M/UL (ref 3.8–5.2)
RBC # BLD AUTO: 2.84 M/UL (ref 3.8–5.2)
RBC # BLD AUTO: 3.24 M/UL (ref 3.8–5.2)
RBC # BLD AUTO: 3.44 X10E6/UL (ref 3.77–5.28)
RBC # BLD AUTO: 3.61 M/UL (ref 3.8–5.2)
RBC # BLD AUTO: 3.75 M/UL (ref 3.8–5.2)
RBC # BLD AUTO: 3.8 X10E6/UL (ref 3.77–5.28)
RBC # BLD AUTO: 4.4 X10E6/UL (ref 3.77–5.28)
RBC # FLD: >100 /CU MM
RBC #/AREA URNS HPF: ABNORMAL /HPF (ref 0–5)
RBC #/AREA URNS HPF: ABNORMAL /HPF (ref 0–5)
RBC #/AREA URNS HPF: NORMAL /HPF (ref 0–5)
RBC MORPH BLD: ABNORMAL
RETICS # AUTO: 0.08 M/UL (ref 0.02–0.08)
RETICS/RBC NFR AUTO: 3.2 % (ref 0.7–2.1)
SAMPLES BEING HELD,HOLD: NORMAL
SERVICE CMNT-IMP: NORMAL
SERVICE CMNT-IMP: NORMAL
SODIUM SERPL-SCNC: 133 MMOL/L (ref 136–145)
SODIUM SERPL-SCNC: 134 MMOL/L (ref 136–145)
SODIUM SERPL-SCNC: 135 MMOL/L (ref 136–145)
SODIUM SERPL-SCNC: 136 MMOL/L (ref 134–144)
SODIUM SERPL-SCNC: 137 MMOL/L (ref 136–145)
SODIUM SERPL-SCNC: 138 MMOL/L (ref 134–144)
SODIUM SERPL-SCNC: 138 MMOL/L (ref 136–145)
SODIUM SERPL-SCNC: 140 MMOL/L (ref 134–144)
SODIUM SERPL-SCNC: 140 MMOL/L (ref 136–145)
SODIUM SERPL-SCNC: 142 MMOL/L (ref 136–145)
SODIUM UR-SCNC: 6 MMOL/L
SODIUM UR-SCNC: <5 MMOL/L
SP GR UR REFRACTOMETRY: 1.01 (ref 1–1.03)
SP GR UR REFRACTOMETRY: 1.01 (ref 1–1.03)
SP GR UR REFRACTOMETRY: 1.02 (ref 1–1.03)
SPECIMEN EXP DATE BLD: NORMAL
SPECIMEN SOURCE FLD: ABNORMAL
STATUS OF UNIT,%ST: NORMAL
STATUS OF UNIT,%ST: NORMAL
TIBC SERPL-MCNC: 326 UG/DL (ref 250–450)
TROPONIN I SERPL-MCNC: <0.04 NG/ML
UNIT DIVISION, %UDIV: 0
UNIT DIVISION, %UDIV: 0
UR CULT HOLD, URHOLD: NORMAL
UR CULT HOLD, URHOLD: NORMAL
UROBILINOGEN UR QL STRIP.AUTO: 0.2 EU/DL (ref 0.2–1)
VENTRICULAR RATE, ECG03: 101 BPM
VENTRICULAR RATE, ECG03: 114 BPM
VENTRICULAR RATE, ECG03: 70 BPM
VENTRICULAR RATE, ECG03: 83 BPM
VIT B12 SERPL-MCNC: 1215 PG/ML (ref 211–911)
WBC # BLD AUTO: 10.8 K/UL (ref 3.6–11)
WBC # BLD AUTO: 3.3 K/UL (ref 3.6–11)
WBC # BLD AUTO: 3.9 X10E3/UL (ref 3.4–10.8)
WBC # BLD AUTO: 4 K/UL (ref 3.6–11)
WBC # BLD AUTO: 4.2 K/UL (ref 3.6–11)
WBC # BLD AUTO: 4.3 K/UL (ref 3.6–11)
WBC # BLD AUTO: 4.6 X10E3/UL (ref 3.4–10.8)
WBC # BLD AUTO: 4.7 K/UL (ref 3.6–11)
WBC # BLD AUTO: 5.2 K/UL (ref 3.6–11)
WBC # BLD AUTO: 5.3 K/UL (ref 3.6–11)
WBC # BLD AUTO: 5.5 K/UL (ref 3.6–11)
WBC # BLD AUTO: 7.2 X10E3/UL (ref 3.4–10.8)
WBC # BLD AUTO: 7.5 K/UL (ref 3.6–11)
WBC # BLD AUTO: 8.7 K/UL (ref 3.6–11)
WBC URNS QL MICRO: ABNORMAL /HPF (ref 0–4)
WBC URNS QL MICRO: ABNORMAL /HPF (ref 0–4)
WBC URNS QL MICRO: NORMAL /HPF (ref 0–4)

## 2018-01-01 PROCEDURE — 74011250637 HC RX REV CODE- 250/637: Performed by: INTERNAL MEDICINE

## 2018-01-01 PROCEDURE — C1713 ANCHOR/SCREW BN/BN,TIS/BN: HCPCS | Performed by: ORTHOPAEDIC SURGERY

## 2018-01-01 PROCEDURE — P9035 PLATELET PHERES LEUKOREDUCED: HCPCS | Performed by: INTERNAL MEDICINE

## 2018-01-01 PROCEDURE — 96374 THER/PROPH/DIAG INJ IV PUSH: CPT

## 2018-01-01 PROCEDURE — 88305 TISSUE EXAM BY PATHOLOGIST: CPT | Performed by: INTERNAL MEDICINE

## 2018-01-01 PROCEDURE — 36415 COLL VENOUS BLD VENIPUNCTURE: CPT | Performed by: INTERNAL MEDICINE

## 2018-01-01 PROCEDURE — 82728 ASSAY OF FERRITIN: CPT | Performed by: EMERGENCY MEDICINE

## 2018-01-01 PROCEDURE — 74011250636 HC RX REV CODE- 250/636: Performed by: INTERNAL MEDICINE

## 2018-01-01 PROCEDURE — 83735 ASSAY OF MAGNESIUM: CPT | Performed by: FAMILY MEDICINE

## 2018-01-01 PROCEDURE — 74011000250 HC RX REV CODE- 250: Performed by: INTERNAL MEDICINE

## 2018-01-01 PROCEDURE — 85025 COMPLETE CBC W/AUTO DIFF WBC: CPT | Performed by: INTERNAL MEDICINE

## 2018-01-01 PROCEDURE — 88365 INSITU HYBRIDIZATION (FISH): CPT | Performed by: INTERNAL MEDICINE

## 2018-01-01 PROCEDURE — 90662 IIV NO PRSV INCREASED AG IM: CPT | Performed by: INTERNAL MEDICINE

## 2018-01-01 PROCEDURE — 65270000029 HC RM PRIVATE

## 2018-01-01 PROCEDURE — 85027 COMPLETE CBC AUTOMATED: CPT | Performed by: INTERNAL MEDICINE

## 2018-01-01 PROCEDURE — 74011250636 HC RX REV CODE- 250/636: Performed by: ORTHOPAEDIC SURGERY

## 2018-01-01 PROCEDURE — 99285 EMERGENCY DEPT VISIT HI MDM: CPT

## 2018-01-01 PROCEDURE — 74011250637 HC RX REV CODE- 250/637: Performed by: ORTHOPAEDIC SURGERY

## 2018-01-01 PROCEDURE — 85025 COMPLETE CBC W/AUTO DIFF WBC: CPT | Performed by: FAMILY MEDICINE

## 2018-01-01 PROCEDURE — 96375 TX/PRO/DX INJ NEW DRUG ADDON: CPT

## 2018-01-01 PROCEDURE — 74011250636 HC RX REV CODE- 250/636: Performed by: HOSPITALIST

## 2018-01-01 PROCEDURE — 74011250637 HC RX REV CODE- 250/637: Performed by: FAMILY MEDICINE

## 2018-01-01 PROCEDURE — 99218 HC RM OBSERVATION: CPT

## 2018-01-01 PROCEDURE — 49083 ABD PARACENTESIS W/IMAGING: CPT

## 2018-01-01 PROCEDURE — 97161 PT EVAL LOW COMPLEX 20 MIN: CPT

## 2018-01-01 PROCEDURE — 74011250636 HC RX REV CODE- 250/636

## 2018-01-01 PROCEDURE — 86923 COMPATIBILITY TEST ELECTRIC: CPT

## 2018-01-01 PROCEDURE — 82140 ASSAY OF AMMONIA: CPT | Performed by: PHYSICIAN ASSISTANT

## 2018-01-01 PROCEDURE — 80048 BASIC METABOLIC PNL TOTAL CA: CPT | Performed by: INTERNAL MEDICINE

## 2018-01-01 PROCEDURE — 74011250636 HC RX REV CODE- 250/636: Performed by: ANESTHESIOLOGY

## 2018-01-01 PROCEDURE — P9047 ALBUMIN (HUMAN), 25%, 50ML: HCPCS | Performed by: INTERNAL MEDICINE

## 2018-01-01 PROCEDURE — 99283 EMERGENCY DEPT VISIT LOW MDM: CPT

## 2018-01-01 PROCEDURE — 77030031139 HC SUT VCRL2 J&J -A

## 2018-01-01 PROCEDURE — 80053 COMPREHEN METABOLIC PANEL: CPT | Performed by: NURSE PRACTITIONER

## 2018-01-01 PROCEDURE — 86900 BLOOD TYPING SEROLOGIC ABO: CPT | Performed by: ORTHOPAEDIC SURGERY

## 2018-01-01 PROCEDURE — 36415 COLL VENOUS BLD VENIPUNCTURE: CPT | Performed by: ORTHOPAEDIC SURGERY

## 2018-01-01 PROCEDURE — 74011250636 HC RX REV CODE- 250/636: Performed by: PHYSICIAN ASSISTANT

## 2018-01-01 PROCEDURE — 94664 DEMO&/EVAL PT USE INHALER: CPT

## 2018-01-01 PROCEDURE — 77010033678 HC OXYGEN DAILY

## 2018-01-01 PROCEDURE — 77030002996 HC SUT SLK J&J -A

## 2018-01-01 PROCEDURE — P9016 RBC LEUKOCYTES REDUCED: HCPCS

## 2018-01-01 PROCEDURE — 83735 ASSAY OF MAGNESIUM: CPT | Performed by: INTERNAL MEDICINE

## 2018-01-01 PROCEDURE — 36430 TRANSFUSION BLD/BLD COMPNT: CPT

## 2018-01-01 PROCEDURE — 90686 IIV4 VACC NO PRSV 0.5 ML IM: CPT | Performed by: HOSPITALIST

## 2018-01-01 PROCEDURE — 74011000250 HC RX REV CODE- 250: Performed by: RADIOLOGY

## 2018-01-01 PROCEDURE — 71045 X-RAY EXAM CHEST 1 VIEW: CPT

## 2018-01-01 PROCEDURE — G8988 SELF CARE GOAL STATUS: HCPCS | Performed by: OCCUPATIONAL THERAPIST

## 2018-01-01 PROCEDURE — 88264 CHROMOSOME ANALYSIS 20-25: CPT | Performed by: INTERNAL MEDICINE

## 2018-01-01 PROCEDURE — 30233R1 TRANSFUSION OF NONAUTOLOGOUS PLATELETS INTO PERIPHERAL VEIN, PERCUTANEOUS APPROACH: ICD-10-PCS | Performed by: INTERNAL MEDICINE

## 2018-01-01 PROCEDURE — 84484 ASSAY OF TROPONIN QUANT: CPT | Performed by: PHYSICIAN ASSISTANT

## 2018-01-01 PROCEDURE — 80076 HEPATIC FUNCTION PANEL: CPT | Performed by: EMERGENCY MEDICINE

## 2018-01-01 PROCEDURE — 74011250636 HC RX REV CODE- 250/636: Performed by: RADIOLOGY

## 2018-01-01 PROCEDURE — 83540 ASSAY OF IRON: CPT | Performed by: EMERGENCY MEDICINE

## 2018-01-01 PROCEDURE — 80053 COMPREHEN METABOLIC PANEL: CPT | Performed by: EMERGENCY MEDICINE

## 2018-01-01 PROCEDURE — 97165 OT EVAL LOW COMPLEX 30 MIN: CPT | Performed by: OCCUPATIONAL THERAPIST

## 2018-01-01 PROCEDURE — 77030039266 HC ADH SKN EXOFIN S2SG -A

## 2018-01-01 PROCEDURE — 88185 FLOWCYTOMETRY/TC ADD-ON: CPT | Performed by: INTERNAL MEDICINE

## 2018-01-01 PROCEDURE — G8978 MOBILITY CURRENT STATUS: HCPCS

## 2018-01-01 PROCEDURE — 77030011943

## 2018-01-01 PROCEDURE — 83605 ASSAY OF LACTIC ACID: CPT | Performed by: PHYSICIAN ASSISTANT

## 2018-01-01 PROCEDURE — 85025 COMPLETE CBC W/AUTO DIFF WBC: CPT | Performed by: PHYSICIAN ASSISTANT

## 2018-01-01 PROCEDURE — 77030002922 HC SUT FBRWRE ARTH -B: Performed by: ORTHOPAEDIC SURGERY

## 2018-01-01 PROCEDURE — 49418 INSERT TUN IP CATH PERC: CPT

## 2018-01-01 PROCEDURE — 93005 ELECTROCARDIOGRAM TRACING: CPT

## 2018-01-01 PROCEDURE — 77030008975 HC BN CANC CHP LIFV -E: Performed by: ORTHOPAEDIC SURGERY

## 2018-01-01 PROCEDURE — 86901 BLOOD TYPING SEROLOGIC RH(D): CPT

## 2018-01-01 PROCEDURE — 88311 DECALCIFY TISSUE: CPT | Performed by: INTERNAL MEDICINE

## 2018-01-01 PROCEDURE — 76060000031 HC ANESTHESIA FIRST 0.5 HR: Performed by: INTERNAL MEDICINE

## 2018-01-01 PROCEDURE — 82272 OCCULT BLD FECES 1-3 TESTS: CPT | Performed by: INTERNAL MEDICINE

## 2018-01-01 PROCEDURE — 77030018836 HC SOL IRR NACL ICUM -A: Performed by: ORTHOPAEDIC SURGERY

## 2018-01-01 PROCEDURE — 82746 ASSAY OF FOLIC ACID SERUM: CPT | Performed by: EMERGENCY MEDICINE

## 2018-01-01 PROCEDURE — 77030011640 HC PAD GRND REM COVD -A: Performed by: ORTHOPAEDIC SURGERY

## 2018-01-01 PROCEDURE — 99152 MOD SED SAME PHYS/QHP 5/>YRS: CPT

## 2018-01-01 PROCEDURE — 97116 GAIT TRAINING THERAPY: CPT

## 2018-01-01 PROCEDURE — 30233N1 TRANSFUSION OF NONAUTOLOGOUS RED BLOOD CELLS INTO PERIPHERAL VEIN, PERCUTANEOUS APPROACH: ICD-10-PCS | Performed by: INTERNAL MEDICINE

## 2018-01-01 PROCEDURE — 76060000036 HC ANESTHESIA 2.5 TO 3 HR: Performed by: ORTHOPAEDIC SURGERY

## 2018-01-01 PROCEDURE — 88374 M/PHMTRC ALYS ISHQUANT/SEMIQ: CPT | Performed by: INTERNAL MEDICINE

## 2018-01-01 PROCEDURE — P9047 ALBUMIN (HUMAN), 25%, 50ML: HCPCS

## 2018-01-01 PROCEDURE — 36415 COLL VENOUS BLD VENIPUNCTURE: CPT | Performed by: EMERGENCY MEDICINE

## 2018-01-01 PROCEDURE — 80053 COMPREHEN METABOLIC PANEL: CPT | Performed by: PHYSICIAN ASSISTANT

## 2018-01-01 PROCEDURE — 94640 AIRWAY INHALATION TREATMENT: CPT

## 2018-01-01 PROCEDURE — 81001 URINALYSIS AUTO W/SCOPE: CPT | Performed by: NURSE PRACTITIONER

## 2018-01-01 PROCEDURE — 0DJ08ZZ INSPECTION OF UPPER INTESTINAL TRACT, VIA NATURAL OR ARTIFICIAL OPENING ENDOSCOPIC: ICD-10-PCS | Performed by: INTERNAL MEDICINE

## 2018-01-01 PROCEDURE — 76040000019: Performed by: INTERNAL MEDICINE

## 2018-01-01 PROCEDURE — 81001 URINALYSIS AUTO W/SCOPE: CPT | Performed by: PHYSICIAN ASSISTANT

## 2018-01-01 PROCEDURE — 74011000258 HC RX REV CODE- 258: Performed by: INTERNAL MEDICINE

## 2018-01-01 PROCEDURE — G8987 SELF CARE CURRENT STATUS: HCPCS | Performed by: OCCUPATIONAL THERAPIST

## 2018-01-01 PROCEDURE — 99153 MOD SED SAME PHYS/QHP EA: CPT

## 2018-01-01 PROCEDURE — 84100 ASSAY OF PHOSPHORUS: CPT | Performed by: INTERNAL MEDICINE

## 2018-01-01 PROCEDURE — 82140 ASSAY OF AMMONIA: CPT | Performed by: EMERGENCY MEDICINE

## 2018-01-01 PROCEDURE — 77030038269 HC DRN EXT URIN PURWCK BARD -A

## 2018-01-01 PROCEDURE — 36415 COLL VENOUS BLD VENIPUNCTURE: CPT | Performed by: FAMILY MEDICINE

## 2018-01-01 PROCEDURE — 90471 IMMUNIZATION ADMIN: CPT

## 2018-01-01 PROCEDURE — 82607 VITAMIN B-12: CPT | Performed by: EMERGENCY MEDICINE

## 2018-01-01 PROCEDURE — G8980 MOBILITY D/C STATUS: HCPCS

## 2018-01-01 PROCEDURE — 76001 XR FLUOROSCOPY OVER 60 MINUTES: CPT

## 2018-01-01 PROCEDURE — G8989 SELF CARE D/C STATUS: HCPCS | Performed by: OCCUPATIONAL THERAPIST

## 2018-01-01 PROCEDURE — 36415 COLL VENOUS BLD VENIPUNCTURE: CPT | Performed by: PHYSICIAN ASSISTANT

## 2018-01-01 PROCEDURE — 76450000000

## 2018-01-01 PROCEDURE — 77030022017 HC DRSG HEMO QCLOT ZMED -A: Performed by: ORTHOPAEDIC SURGERY

## 2018-01-01 PROCEDURE — 83735 ASSAY OF MAGNESIUM: CPT | Performed by: PHYSICIAN ASSISTANT

## 2018-01-01 PROCEDURE — 88237 TISSUE CULTURE BONE MARROW: CPT | Performed by: INTERNAL MEDICINE

## 2018-01-01 PROCEDURE — 74011000250 HC RX REV CODE- 250

## 2018-01-01 PROCEDURE — 87040 BLOOD CULTURE FOR BACTERIA: CPT | Performed by: PHYSICIAN ASSISTANT

## 2018-01-01 PROCEDURE — 94760 N-INVAS EAR/PLS OXIMETRY 1: CPT

## 2018-01-01 PROCEDURE — 77030028872 HC BN BIOP NDL ON CNTRL TY TELE -C

## 2018-01-01 PROCEDURE — 77030012406 HC DRN WND PENRS BARD -A: Performed by: ORTHOPAEDIC SURGERY

## 2018-01-01 PROCEDURE — 77030020782 HC GWN BAIR PAWS FLX 3M -B

## 2018-01-01 PROCEDURE — 85025 COMPLETE CBC W/AUTO DIFF WBC: CPT | Performed by: EMERGENCY MEDICINE

## 2018-01-01 PROCEDURE — 85610 PROTHROMBIN TIME: CPT | Performed by: INTERNAL MEDICINE

## 2018-01-01 PROCEDURE — 83690 ASSAY OF LIPASE: CPT | Performed by: PHYSICIAN ASSISTANT

## 2018-01-01 PROCEDURE — 82140 ASSAY OF AMMONIA: CPT | Performed by: FAMILY MEDICINE

## 2018-01-01 PROCEDURE — 86900 BLOOD TYPING SEROLOGIC ABO: CPT | Performed by: PHYSICIAN ASSISTANT

## 2018-01-01 PROCEDURE — 83690 ASSAY OF LIPASE: CPT | Performed by: NURSE PRACTITIONER

## 2018-01-01 PROCEDURE — 85045 AUTOMATED RETICULOCYTE COUNT: CPT | Performed by: EMERGENCY MEDICINE

## 2018-01-01 PROCEDURE — C1729 CATH, DRAINAGE: HCPCS

## 2018-01-01 PROCEDURE — 0DBP8ZX EXCISION OF RECTUM, VIA NATURAL OR ARTIFICIAL OPENING ENDOSCOPIC, DIAGNOSTIC: ICD-10-PCS | Performed by: INTERNAL MEDICINE

## 2018-01-01 PROCEDURE — 77030008684 HC TU ET CUF COVD -B: Performed by: ANESTHESIOLOGY

## 2018-01-01 PROCEDURE — 80053 COMPREHEN METABOLIC PANEL: CPT | Performed by: FAMILY MEDICINE

## 2018-01-01 PROCEDURE — 0W9G30Z DRAINAGE OF PERITONEAL CAVITY WITH DRAINAGE DEVICE, PERCUTANEOUS APPROACH: ICD-10-PCS | Performed by: RADIOLOGY

## 2018-01-01 PROCEDURE — 85097 BONE MARROW INTERPRETATION: CPT | Performed by: INTERNAL MEDICINE

## 2018-01-01 PROCEDURE — 97110 THERAPEUTIC EXERCISES: CPT | Performed by: OCCUPATIONAL THERAPIST

## 2018-01-01 PROCEDURE — 76210000017 HC OR PH I REC 1.5 TO 2 HR: Performed by: ORTHOPAEDIC SURGERY

## 2018-01-01 PROCEDURE — 73030 X-RAY EXAM OF SHOULDER: CPT

## 2018-01-01 PROCEDURE — 38221 DX BONE MARROW BIOPSIES: CPT

## 2018-01-01 PROCEDURE — 97530 THERAPEUTIC ACTIVITIES: CPT

## 2018-01-01 PROCEDURE — 88184 FLOWCYTOMETRY/ TC 1 MARKER: CPT | Performed by: INTERNAL MEDICINE

## 2018-01-01 PROCEDURE — 77030029684 HC NEB SM VOL KT MONA -A

## 2018-01-01 PROCEDURE — 76010000132 HC OR TIME 2.5 TO 3 HR: Performed by: ORTHOPAEDIC SURGERY

## 2018-01-01 PROCEDURE — 77030031139 HC SUT VCRL2 J&J -A: Performed by: ORTHOPAEDIC SURGERY

## 2018-01-01 PROCEDURE — 74011250637 HC RX REV CODE- 250/637: Performed by: NURSE PRACTITIONER

## 2018-01-01 PROCEDURE — 99284 EMERGENCY DEPT VISIT MOD MDM: CPT

## 2018-01-01 PROCEDURE — 83615 LACTATE (LD) (LDH) ENZYME: CPT | Performed by: EMERGENCY MEDICINE

## 2018-01-01 PROCEDURE — 85025 COMPLETE CBC W/AUTO DIFF WBC: CPT | Performed by: NURSE PRACTITIONER

## 2018-01-01 PROCEDURE — 77030003601 HC NDL NRV BLK BBMI -A

## 2018-01-01 PROCEDURE — 74011000250 HC RX REV CODE- 250: Performed by: PHYSICIAN ASSISTANT

## 2018-01-01 PROCEDURE — 84300 ASSAY OF URINE SODIUM: CPT | Performed by: INTERNAL MEDICINE

## 2018-01-01 PROCEDURE — 07DR3ZX EXTRACTION OF ILIAC BONE MARROW, PERCUTANEOUS APPROACH, DIAGNOSTIC: ICD-10-PCS | Performed by: RADIOLOGY

## 2018-01-01 PROCEDURE — 83880 ASSAY OF NATRIURETIC PEPTIDE: CPT | Performed by: PHYSICIAN ASSISTANT

## 2018-01-01 PROCEDURE — 85610 PROTHROMBIN TIME: CPT

## 2018-01-01 PROCEDURE — 88342 IMHCHEM/IMCYTCHM 1ST ANTB: CPT | Performed by: INTERNAL MEDICINE

## 2018-01-01 PROCEDURE — 77030033269 HC SLV COMPR SCD KNE2 CARD -B

## 2018-01-01 PROCEDURE — 83010 ASSAY OF HAPTOGLOBIN QUANT: CPT | Performed by: EMERGENCY MEDICINE

## 2018-01-01 PROCEDURE — 89050 BODY FLUID CELL COUNT: CPT | Performed by: STUDENT IN AN ORGANIZED HEALTH CARE EDUCATION/TRAINING PROGRAM

## 2018-01-01 PROCEDURE — G8979 MOBILITY GOAL STATUS: HCPCS

## 2018-01-01 PROCEDURE — 77030009426 HC FCPS BIOP ENDOSC BSC -B: Performed by: INTERNAL MEDICINE

## 2018-01-01 PROCEDURE — 82272 OCCULT BLD FECES 1-3 TESTS: CPT | Performed by: PHYSICIAN ASSISTANT

## 2018-01-01 PROCEDURE — 74011250637 HC RX REV CODE- 250/637: Performed by: HOSPITALIST

## 2018-01-01 PROCEDURE — 36415 COLL VENOUS BLD VENIPUNCTURE: CPT | Performed by: NURSE PRACTITIONER

## 2018-01-01 PROCEDURE — 82272 OCCULT BLD FECES 1-3 TESTS: CPT

## 2018-01-01 PROCEDURE — 77030028224 HC PDNG CST BSNM -A: Performed by: ORTHOPAEDIC SURGERY

## 2018-01-01 PROCEDURE — 77030018846 HC SOL IRR STRL H20 ICUM -A

## 2018-01-01 PROCEDURE — 74011250636 HC RX REV CODE- 250/636: Performed by: NURSE PRACTITIONER

## 2018-01-01 PROCEDURE — 77030003862 HC BIT DRL SYNT -B: Performed by: ORTHOPAEDIC SURGERY

## 2018-01-01 PROCEDURE — 80053 COMPREHEN METABOLIC PANEL: CPT | Performed by: INTERNAL MEDICINE

## 2018-01-01 PROCEDURE — C1892 INTRO/SHEATH,FIXED,PEEL-AWAY: HCPCS

## 2018-01-01 PROCEDURE — 77030026438 HC STYL ET INTUB CARD -A: Performed by: ANESTHESIOLOGY

## 2018-01-01 PROCEDURE — P9045 ALBUMIN (HUMAN), 5%, 250 ML: HCPCS

## 2018-01-01 PROCEDURE — 88313 SPECIAL STAINS GROUP 2: CPT | Performed by: INTERNAL MEDICINE

## 2018-01-01 DEVICE — SCREW BNE L30MM DIA4MM CORT S STL NONCANNULATED ST LOK FULL: Type: IMPLANTABLE DEVICE | Site: HUMERUS | Status: FUNCTIONAL

## 2018-01-01 DEVICE — 3.5MM CORTEX SCREW SELF-TAPPING 24MM: Type: IMPLANTABLE DEVICE | Site: HUMERUS | Status: FUNCTIONAL

## 2018-01-01 DEVICE — SCREW BNE L26MM DIA4.5MM PROX CORT TIB S STL ST LOK FULL: Type: IMPLANTABLE DEVICE | Site: HUMERUS | Status: FUNCTIONAL

## 2018-01-01 DEVICE — PLATE BNE W13.5XL206MM THK4.2MM 11 H BILAT S STL NAR LOK: Type: IMPLANTABLE DEVICE | Site: HUMERUS | Status: FUNCTIONAL

## 2018-01-01 DEVICE — SCREW BNE L26MM DIA3.5MM CORT S STL ST NONCANNULATED LOK: Type: IMPLANTABLE DEVICE | Site: HUMERUS | Status: FUNCTIONAL

## 2018-01-01 DEVICE — BONE CHIP CANC CRSH 1-8MM 40ML -- PCAN1/2 PRESERVON: Type: IMPLANTABLE DEVICE | Site: HUMERUS | Status: FUNCTIONAL

## 2018-01-01 DEVICE — SCREW BNE L30MM DIA4.5MM PROX CORT TIB S STL ST LOK FULL: Type: IMPLANTABLE DEVICE | Site: HUMERUS | Status: FUNCTIONAL

## 2018-01-01 DEVICE — SCREW BNE L38MM DIA4.5MM PROX CORT TIB S STL ST LOK FULL: Type: IMPLANTABLE DEVICE | Site: HUMERUS | Status: FUNCTIONAL

## 2018-01-01 RX ORDER — POLYETHYLENE GLYCOL 3350 17 G/17G
17 POWDER, FOR SOLUTION ORAL
Status: COMPLETED | OUTPATIENT
Start: 2018-01-01 | End: 2018-01-01

## 2018-01-01 RX ORDER — SPIRONOLACTONE 100 MG/1
100 TABLET, FILM COATED ORAL DAILY
Qty: 30 TAB | Refills: 3 | Status: SHIPPED | OUTPATIENT
Start: 2018-01-01 | End: 2018-01-01

## 2018-01-01 RX ORDER — DEXTROSE, SODIUM CHLORIDE, AND POTASSIUM CHLORIDE 5; .45; .15 G/100ML; G/100ML; G/100ML
75 INJECTION INTRAVENOUS CONTINUOUS
Status: DISCONTINUED | OUTPATIENT
Start: 2018-01-01 | End: 2018-01-01

## 2018-01-01 RX ORDER — BISACODYL 5 MG
5 TABLET, DELAYED RELEASE (ENTERIC COATED) ORAL AS NEEDED
Status: DISCONTINUED | OUTPATIENT
Start: 2018-01-01 | End: 2018-01-01 | Stop reason: HOSPADM

## 2018-01-01 RX ORDER — FENTANYL CITRATE 50 UG/ML
100 INJECTION, SOLUTION INTRAMUSCULAR; INTRAVENOUS
Status: DISCONTINUED | OUTPATIENT
Start: 2018-01-01 | End: 2018-01-01 | Stop reason: ALTCHOICE

## 2018-01-01 RX ORDER — OMEPRAZOLE 20 MG/1
20 CAPSULE, DELAYED RELEASE ORAL DAILY
Status: DISCONTINUED | OUTPATIENT
Start: 2018-01-01 | End: 2018-01-01 | Stop reason: CLARIF

## 2018-01-01 RX ORDER — ALBUTEROL SULFATE 0.83 MG/ML
2.5 SOLUTION RESPIRATORY (INHALATION)
COMMUNITY
End: 2018-01-01

## 2018-01-01 RX ORDER — FUROSEMIDE 80 MG/1
40 TABLET ORAL DAILY
COMMUNITY
End: 2018-01-01

## 2018-01-01 RX ORDER — ALBUMIN HUMAN 250 G/1000ML
SOLUTION INTRAVENOUS
Status: COMPLETED
Start: 2018-01-01 | End: 2018-01-01

## 2018-01-01 RX ORDER — ATROPINE SULFATE 0.1 MG/ML
0.5 INJECTION INTRAVENOUS
Status: DISCONTINUED | OUTPATIENT
Start: 2018-01-01 | End: 2018-01-01 | Stop reason: HOSPADM

## 2018-01-01 RX ORDER — FUROSEMIDE 40 MG/1
40 TABLET ORAL DAILY
COMMUNITY
End: 2018-01-01

## 2018-01-01 RX ORDER — MIDAZOLAM HYDROCHLORIDE 1 MG/ML
1 INJECTION, SOLUTION INTRAMUSCULAR; INTRAVENOUS AS NEEDED
Status: DISCONTINUED | OUTPATIENT
Start: 2018-01-01 | End: 2018-01-01 | Stop reason: HOSPADM

## 2018-01-01 RX ORDER — FENTANYL CITRATE 50 UG/ML
50 INJECTION, SOLUTION INTRAMUSCULAR; INTRAVENOUS AS NEEDED
Status: DISCONTINUED | OUTPATIENT
Start: 2018-01-01 | End: 2018-01-01 | Stop reason: HOSPADM

## 2018-01-01 RX ORDER — OXYCODONE HYDROCHLORIDE 5 MG/1
5 TABLET ORAL
Status: DISCONTINUED | OUTPATIENT
Start: 2018-01-01 | End: 2018-01-01

## 2018-01-01 RX ORDER — OXYCODONE HYDROCHLORIDE 5 MG/1
5 TABLET ORAL
Qty: 50 TAB | Refills: 0 | Status: SHIPPED | OUTPATIENT
Start: 2018-01-01 | End: 2018-01-01

## 2018-01-01 RX ORDER — FENTANYL CITRATE 50 UG/ML
INJECTION, SOLUTION INTRAMUSCULAR; INTRAVENOUS AS NEEDED
Status: DISCONTINUED | OUTPATIENT
Start: 2018-01-01 | End: 2018-01-01 | Stop reason: HOSPADM

## 2018-01-01 RX ORDER — FLUMAZENIL 0.1 MG/ML
0.2 INJECTION INTRAVENOUS
Status: DISCONTINUED | OUTPATIENT
Start: 2018-01-01 | End: 2018-01-01 | Stop reason: HOSPADM

## 2018-01-01 RX ORDER — ONDANSETRON 4 MG/1
4 TABLET, ORALLY DISINTEGRATING ORAL AS NEEDED
COMMUNITY

## 2018-01-01 RX ORDER — SPIRONOLACTONE 25 MG/1
25 TABLET ORAL DAILY
Qty: 30 TAB | Refills: 0 | Status: SHIPPED | OUTPATIENT
Start: 2018-01-01 | End: 2018-01-01

## 2018-01-01 RX ORDER — SODIUM CHLORIDE 0.9 % (FLUSH) 0.9 %
5-10 SYRINGE (ML) INJECTION AS NEEDED
Status: DISCONTINUED | OUTPATIENT
Start: 2018-01-01 | End: 2018-01-01 | Stop reason: HOSPADM

## 2018-01-01 RX ORDER — FAMOTIDINE 20 MG/1
20 TABLET, FILM COATED ORAL DAILY
Status: DISCONTINUED | OUTPATIENT
Start: 2018-01-01 | End: 2018-01-01 | Stop reason: HOSPADM

## 2018-01-01 RX ORDER — FUROSEMIDE 80 MG/1
80 TABLET ORAL DAILY
COMMUNITY
End: 2018-01-01 | Stop reason: DRUGHIGH

## 2018-01-01 RX ORDER — MORPHINE SULFATE 10 MG/ML
2 INJECTION, SOLUTION INTRAMUSCULAR; INTRAVENOUS
Status: DISCONTINUED | OUTPATIENT
Start: 2018-01-01 | End: 2018-01-01 | Stop reason: HOSPADM

## 2018-01-01 RX ORDER — PROPOFOL 10 MG/ML
INJECTION, EMULSION INTRAVENOUS AS NEEDED
Status: DISCONTINUED | OUTPATIENT
Start: 2018-01-01 | End: 2018-01-01 | Stop reason: HOSPADM

## 2018-01-01 RX ORDER — ACETAMINOPHEN 10 MG/ML
INJECTION, SOLUTION INTRAVENOUS AS NEEDED
Status: DISCONTINUED | OUTPATIENT
Start: 2018-01-01 | End: 2018-01-01

## 2018-01-01 RX ORDER — SODIUM CHLORIDE, SODIUM LACTATE, POTASSIUM CHLORIDE, CALCIUM CHLORIDE 600; 310; 30; 20 MG/100ML; MG/100ML; MG/100ML; MG/100ML
75 INJECTION, SOLUTION INTRAVENOUS CONTINUOUS
Status: DISCONTINUED | OUTPATIENT
Start: 2018-01-01 | End: 2018-01-01

## 2018-01-01 RX ORDER — ALBUMIN HUMAN 50 G/1000ML
SOLUTION INTRAVENOUS AS NEEDED
Status: DISCONTINUED | OUTPATIENT
Start: 2018-01-01 | End: 2018-01-01 | Stop reason: HOSPADM

## 2018-01-01 RX ORDER — ROCURONIUM BROMIDE 10 MG/ML
INJECTION, SOLUTION INTRAVENOUS AS NEEDED
Status: DISCONTINUED | OUTPATIENT
Start: 2018-01-01 | End: 2018-01-01 | Stop reason: HOSPADM

## 2018-01-01 RX ORDER — CEFAZOLIN SODIUM/WATER 2 G/20 ML
2 SYRINGE (ML) INTRAVENOUS
Status: COMPLETED | OUTPATIENT
Start: 2018-01-01 | End: 2018-01-01

## 2018-01-01 RX ORDER — FENTANYL CITRATE 50 UG/ML
25 INJECTION, SOLUTION INTRAMUSCULAR; INTRAVENOUS
Status: COMPLETED | OUTPATIENT
Start: 2018-01-01 | End: 2018-01-01

## 2018-01-01 RX ORDER — PHENYLEPHRINE HCL IN 0.9% NACL 0.4MG/10ML
SYRINGE (ML) INTRAVENOUS AS NEEDED
Status: DISCONTINUED | OUTPATIENT
Start: 2018-01-01 | End: 2018-01-01 | Stop reason: HOSPADM

## 2018-01-01 RX ORDER — ONDANSETRON 2 MG/ML
4 INJECTION INTRAMUSCULAR; INTRAVENOUS AS NEEDED
Status: DISCONTINUED | OUTPATIENT
Start: 2018-01-01 | End: 2018-01-01 | Stop reason: HOSPADM

## 2018-01-01 RX ORDER — SODIUM CHLORIDE 9 MG/ML
50 INJECTION, SOLUTION INTRAVENOUS CONTINUOUS
Status: DISCONTINUED | OUTPATIENT
Start: 2018-01-01 | End: 2018-01-01 | Stop reason: HOSPADM

## 2018-01-01 RX ORDER — LIDOCAINE HYDROCHLORIDE 20 MG/ML
INJECTION, SOLUTION EPIDURAL; INFILTRATION; INTRACAUDAL; PERINEURAL AS NEEDED
Status: DISCONTINUED | OUTPATIENT
Start: 2018-01-01 | End: 2018-01-01 | Stop reason: HOSPADM

## 2018-01-01 RX ORDER — SODIUM CHLORIDE 9 MG/ML
250 INJECTION, SOLUTION INTRAVENOUS AS NEEDED
Status: DISCONTINUED | OUTPATIENT
Start: 2018-01-01 | End: 2018-01-01 | Stop reason: HOSPADM

## 2018-01-01 RX ORDER — LIDOCAINE 50 MG/G
2 PATCH TOPICAL EVERY 24 HOURS
Status: DISCONTINUED | OUTPATIENT
Start: 2018-01-01 | End: 2018-01-01 | Stop reason: HOSPADM

## 2018-01-01 RX ORDER — DIPHENHYDRAMINE HCL 25 MG
25 CAPSULE ORAL
Status: DISCONTINUED | OUTPATIENT
Start: 2018-01-01 | End: 2018-01-01 | Stop reason: HOSPADM

## 2018-01-01 RX ORDER — SODIUM CHLORIDE 9 MG/ML
25 INJECTION, SOLUTION INTRAVENOUS AS NEEDED
Status: DISPENSED | OUTPATIENT
Start: 2018-01-01 | End: 2018-01-01

## 2018-01-01 RX ORDER — FUROSEMIDE 40 MG/1
80 TABLET ORAL
Status: DISCONTINUED | OUTPATIENT
Start: 2018-01-01 | End: 2018-01-01 | Stop reason: HOSPADM

## 2018-01-01 RX ORDER — FUROSEMIDE 40 MG/1
40 TABLET ORAL
Status: DISCONTINUED | OUTPATIENT
Start: 2018-01-01 | End: 2018-01-01

## 2018-01-01 RX ORDER — AMOXICILLIN 250 MG
1 CAPSULE ORAL 2 TIMES DAILY
Status: DISCONTINUED | OUTPATIENT
Start: 2018-01-01 | End: 2018-01-01 | Stop reason: HOSPADM

## 2018-01-01 RX ORDER — ACETAMINOPHEN 325 MG/1
650 TABLET ORAL
Status: DISCONTINUED | OUTPATIENT
Start: 2018-01-01 | End: 2018-01-01

## 2018-01-01 RX ORDER — NALOXONE HYDROCHLORIDE 0.4 MG/ML
0.4 INJECTION, SOLUTION INTRAMUSCULAR; INTRAVENOUS; SUBCUTANEOUS AS NEEDED
Status: DISCONTINUED | OUTPATIENT
Start: 2018-01-01 | End: 2018-01-01 | Stop reason: HOSPADM

## 2018-01-01 RX ORDER — CEFAZOLIN SODIUM/WATER 2 G/20 ML
2 SYRINGE (ML) INTRAVENOUS ONCE
Status: COMPLETED | OUTPATIENT
Start: 2018-01-01 | End: 2018-01-01

## 2018-01-01 RX ORDER — NAPROXEN SODIUM 220 MG
220 TABLET ORAL
COMMUNITY
End: 2018-01-01

## 2018-01-01 RX ORDER — ACETAMINOPHEN 325 MG/1
650 TABLET ORAL
Status: DISCONTINUED | OUTPATIENT
Start: 2018-01-01 | End: 2018-01-01 | Stop reason: HOSPADM

## 2018-01-01 RX ORDER — FUROSEMIDE 10 MG/ML
20 INJECTION INTRAMUSCULAR; INTRAVENOUS ONCE
Status: COMPLETED | OUTPATIENT
Start: 2018-01-01 | End: 2018-01-01

## 2018-01-01 RX ORDER — DEXTROSE, SODIUM CHLORIDE, SODIUM LACTATE, POTASSIUM CHLORIDE, AND CALCIUM CHLORIDE 5; .6; .31; .03; .02 G/100ML; G/100ML; G/100ML; G/100ML; G/100ML
125 INJECTION, SOLUTION INTRAVENOUS CONTINUOUS
Status: DISCONTINUED | OUTPATIENT
Start: 2018-01-01 | End: 2018-01-01 | Stop reason: HOSPADM

## 2018-01-01 RX ORDER — ONDANSETRON 2 MG/ML
4 INJECTION INTRAMUSCULAR; INTRAVENOUS
Status: DISCONTINUED | OUTPATIENT
Start: 2018-01-01 | End: 2018-01-01 | Stop reason: HOSPADM

## 2018-01-01 RX ORDER — SODIUM CHLORIDE 9 MG/ML
25 INJECTION, SOLUTION INTRAVENOUS CONTINUOUS
Status: DISPENSED | OUTPATIENT
Start: 2018-01-01 | End: 2018-01-01

## 2018-01-01 RX ORDER — METOPROLOL TARTRATE 25 MG/1
25 TABLET, FILM COATED ORAL DAILY
Status: DISCONTINUED | OUTPATIENT
Start: 2018-01-01 | End: 2018-01-01

## 2018-01-01 RX ORDER — LIDOCAINE HYDROCHLORIDE 20 MG/ML
INJECTION, SOLUTION EPIDURAL; INFILTRATION; INTRACAUDAL; PERINEURAL
Status: COMPLETED
Start: 2018-01-01 | End: 2018-01-01

## 2018-01-01 RX ORDER — ONDANSETRON 2 MG/ML
INJECTION INTRAMUSCULAR; INTRAVENOUS AS NEEDED
Status: DISCONTINUED | OUTPATIENT
Start: 2018-01-01 | End: 2018-01-01 | Stop reason: HOSPADM

## 2018-01-01 RX ORDER — FENTANYL CITRATE 50 UG/ML
200 INJECTION, SOLUTION INTRAMUSCULAR; INTRAVENOUS
Status: DISCONTINUED | OUTPATIENT
Start: 2018-01-01 | End: 2018-01-01 | Stop reason: HOSPADM

## 2018-01-01 RX ORDER — ALBUMIN HUMAN 250 G/1000ML
25 SOLUTION INTRAVENOUS EVERY 6 HOURS
Status: DISCONTINUED | OUTPATIENT
Start: 2018-01-01 | End: 2018-01-01

## 2018-01-01 RX ORDER — SUCCINYLCHOLINE CHLORIDE 20 MG/ML
INJECTION INTRAMUSCULAR; INTRAVENOUS AS NEEDED
Status: DISCONTINUED | OUTPATIENT
Start: 2018-01-01 | End: 2018-01-01 | Stop reason: HOSPADM

## 2018-01-01 RX ORDER — MORPHINE SULFATE 1 MG/ML
2 INJECTION, SOLUTION EPIDURAL; INTRATHECAL; INTRAVENOUS
Status: ACTIVE | OUTPATIENT
Start: 2018-01-01 | End: 2018-01-01

## 2018-01-01 RX ORDER — DIPHENHYDRAMINE HCL 12.5MG/5ML
12.5 ELIXIR ORAL
Status: ACTIVE | OUTPATIENT
Start: 2018-01-01 | End: 2018-01-01

## 2018-01-01 RX ORDER — LIDOCAINE HYDROCHLORIDE 10 MG/ML
10 INJECTION, SOLUTION EPIDURAL; INFILTRATION; INTRACAUDAL; PERINEURAL
Status: COMPLETED | OUTPATIENT
Start: 2018-01-01 | End: 2018-01-01

## 2018-01-01 RX ORDER — ALBUTEROL SULFATE 0.83 MG/ML
2.5 SOLUTION RESPIRATORY (INHALATION)
Status: DISCONTINUED | OUTPATIENT
Start: 2018-01-01 | End: 2018-01-01 | Stop reason: HOSPADM

## 2018-01-01 RX ORDER — FAMOTIDINE 20 MG/1
20 TABLET, FILM COATED ORAL 2 TIMES DAILY
Status: DISCONTINUED | OUTPATIENT
Start: 2018-01-01 | End: 2018-01-01

## 2018-01-01 RX ORDER — PANTOPRAZOLE SODIUM 40 MG/1
40 TABLET, DELAYED RELEASE ORAL
Status: DISCONTINUED | OUTPATIENT
Start: 2018-01-01 | End: 2018-01-01 | Stop reason: HOSPADM

## 2018-01-01 RX ORDER — FUROSEMIDE 10 MG/ML
40 INJECTION INTRAMUSCULAR; INTRAVENOUS ONCE
Status: COMPLETED | OUTPATIENT
Start: 2018-01-01 | End: 2018-01-01

## 2018-01-01 RX ORDER — SODIUM BICARBONATE 650 MG/1
325 TABLET ORAL 2 TIMES DAILY
Status: DISCONTINUED | OUTPATIENT
Start: 2018-01-01 | End: 2018-01-01 | Stop reason: HOSPADM

## 2018-01-01 RX ORDER — OXYCODONE HYDROCHLORIDE 5 MG/1
5 TABLET ORAL ONCE
Status: COMPLETED | OUTPATIENT
Start: 2018-01-01 | End: 2018-01-01

## 2018-01-01 RX ORDER — MORPHINE SULFATE 2 MG/ML
2 INJECTION, SOLUTION INTRAMUSCULAR; INTRAVENOUS ONCE
Status: COMPLETED | OUTPATIENT
Start: 2018-01-01 | End: 2018-01-01

## 2018-01-01 RX ORDER — ONDANSETRON 2 MG/ML
4 INJECTION INTRAMUSCULAR; INTRAVENOUS
Status: ACTIVE | OUTPATIENT
Start: 2018-01-01 | End: 2018-01-01

## 2018-01-01 RX ORDER — NALOXONE HYDROCHLORIDE 0.4 MG/ML
0.4 INJECTION, SOLUTION INTRAMUSCULAR; INTRAVENOUS; SUBCUTANEOUS
Status: DISCONTINUED | OUTPATIENT
Start: 2018-01-01 | End: 2018-01-01 | Stop reason: HOSPADM

## 2018-01-01 RX ORDER — MIDAZOLAM HYDROCHLORIDE 1 MG/ML
5 INJECTION, SOLUTION INTRAMUSCULAR; INTRAVENOUS
Status: DISCONTINUED | OUTPATIENT
Start: 2018-01-01 | End: 2018-01-01 | Stop reason: HOSPADM

## 2018-01-01 RX ORDER — SODIUM CHLORIDE 0.9 % (FLUSH) 0.9 %
5-10 SYRINGE (ML) INJECTION EVERY 8 HOURS
Status: DISCONTINUED | OUTPATIENT
Start: 2018-01-01 | End: 2018-01-01 | Stop reason: HOSPADM

## 2018-01-01 RX ORDER — MIDAZOLAM HYDROCHLORIDE 1 MG/ML
1 INJECTION, SOLUTION INTRAMUSCULAR; INTRAVENOUS
Status: DISCONTINUED | OUTPATIENT
Start: 2018-01-01 | End: 2018-01-01 | Stop reason: HOSPADM

## 2018-01-01 RX ORDER — FUROSEMIDE 40 MG/1
40 TABLET ORAL DAILY
Qty: 30 TAB | Refills: 0 | Status: SHIPPED | OUTPATIENT
Start: 2018-01-01 | End: 2018-01-01

## 2018-01-01 RX ORDER — LIDOCAINE HYDROCHLORIDE 10 MG/ML
0.5 INJECTION, SOLUTION EPIDURAL; INFILTRATION; INTRACAUDAL; PERINEURAL AS NEEDED
Status: DISCONTINUED | OUTPATIENT
Start: 2018-01-01 | End: 2018-01-01 | Stop reason: HOSPADM

## 2018-01-01 RX ORDER — LACTULOSE 10 G/15ML
20 SOLUTION ORAL; RECTAL EVERY 6 HOURS
Qty: 840 ML | Refills: 0 | Status: SHIPPED | OUTPATIENT
Start: 2018-01-01 | End: 2018-01-01

## 2018-01-01 RX ORDER — MIDAZOLAM HYDROCHLORIDE 1 MG/ML
5 INJECTION, SOLUTION INTRAMUSCULAR; INTRAVENOUS
Status: DISCONTINUED | OUTPATIENT
Start: 2018-01-01 | End: 2018-01-01 | Stop reason: ALTCHOICE

## 2018-01-01 RX ORDER — DEXAMETHASONE SODIUM PHOSPHATE 4 MG/ML
INJECTION, SOLUTION INTRA-ARTICULAR; INTRALESIONAL; INTRAMUSCULAR; INTRAVENOUS; SOFT TISSUE AS NEEDED
Status: DISCONTINUED | OUTPATIENT
Start: 2018-01-01 | End: 2018-01-01 | Stop reason: HOSPADM

## 2018-01-01 RX ORDER — ONDANSETRON 2 MG/ML
4 INJECTION INTRAMUSCULAR; INTRAVENOUS
Status: COMPLETED | OUTPATIENT
Start: 2018-01-01 | End: 2018-01-01

## 2018-01-01 RX ORDER — SODIUM CHLORIDE, SODIUM LACTATE, POTASSIUM CHLORIDE, CALCIUM CHLORIDE 600; 310; 30; 20 MG/100ML; MG/100ML; MG/100ML; MG/100ML
125 INJECTION, SOLUTION INTRAVENOUS CONTINUOUS
Status: DISCONTINUED | OUTPATIENT
Start: 2018-01-01 | End: 2018-01-01 | Stop reason: HOSPADM

## 2018-01-01 RX ORDER — SODIUM CHLORIDE 9 MG/ML
25 INJECTION, SOLUTION INTRAVENOUS CONTINUOUS
Status: DISCONTINUED | OUTPATIENT
Start: 2018-01-01 | End: 2018-01-01 | Stop reason: HOSPADM

## 2018-01-01 RX ORDER — CEFAZOLIN SODIUM/WATER 2 G/20 ML
2 SYRINGE (ML) INTRAVENOUS EVERY 8 HOURS
Status: COMPLETED | OUTPATIENT
Start: 2018-01-01 | End: 2018-01-01

## 2018-01-01 RX ORDER — OXYCODONE HYDROCHLORIDE 5 MG/1
10 TABLET ORAL
Status: DISCONTINUED | OUTPATIENT
Start: 2018-01-01 | End: 2018-01-01 | Stop reason: HOSPADM

## 2018-01-01 RX ORDER — SODIUM CHLORIDE 0.9 % (FLUSH) 0.9 %
5-10 SYRINGE (ML) INJECTION AS NEEDED
Status: ACTIVE | OUTPATIENT
Start: 2018-01-01 | End: 2018-01-01

## 2018-01-01 RX ORDER — FUROSEMIDE 80 MG/1
40 TABLET ORAL 2 TIMES DAILY
COMMUNITY
End: 2018-01-01 | Stop reason: SDUPTHER

## 2018-01-01 RX ORDER — NEOSTIGMINE METHYLSULFATE 1 MG/ML
INJECTION INTRAVENOUS AS NEEDED
Status: DISCONTINUED | OUTPATIENT
Start: 2018-01-01 | End: 2018-01-01 | Stop reason: HOSPADM

## 2018-01-01 RX ORDER — OXYCODONE HYDROCHLORIDE 5 MG/1
5 TABLET ORAL
Status: DISCONTINUED | OUTPATIENT
Start: 2018-01-01 | End: 2018-01-01 | Stop reason: HOSPADM

## 2018-01-01 RX ORDER — EPINEPHRINE 0.1 MG/ML
1 INJECTION INTRACARDIAC; INTRAVENOUS
Status: DISCONTINUED | OUTPATIENT
Start: 2018-01-01 | End: 2018-01-01 | Stop reason: HOSPADM

## 2018-01-01 RX ORDER — ALBUTEROL SULFATE 0.83 MG/ML
2.5 SOLUTION RESPIRATORY (INHALATION)
COMMUNITY

## 2018-01-01 RX ORDER — OXYCODONE HYDROCHLORIDE 5 MG/1
5 TABLET ORAL
Qty: 10 TAB | Refills: 0 | Status: SHIPPED | OUTPATIENT
Start: 2018-01-01

## 2018-01-01 RX ORDER — ONDANSETRON 4 MG/1
4 TABLET, ORALLY DISINTEGRATING ORAL
COMMUNITY
End: 2018-01-01

## 2018-01-01 RX ORDER — ALBUMIN HUMAN 250 G/1000ML
12.5 SOLUTION INTRAVENOUS EVERY 6 HOURS
Status: DISCONTINUED | OUTPATIENT
Start: 2018-01-01 | End: 2018-01-01 | Stop reason: HOSPADM

## 2018-01-01 RX ORDER — DIPHENHYDRAMINE HYDROCHLORIDE 50 MG/ML
12.5 INJECTION, SOLUTION INTRAMUSCULAR; INTRAVENOUS AS NEEDED
Status: DISCONTINUED | OUTPATIENT
Start: 2018-01-01 | End: 2018-01-01 | Stop reason: HOSPADM

## 2018-01-01 RX ORDER — POLYETHYLENE GLYCOL 3350 17 G/17G
17 POWDER, FOR SOLUTION ORAL DAILY
Status: DISCONTINUED | OUTPATIENT
Start: 2018-01-01 | End: 2018-01-01

## 2018-01-01 RX ORDER — LIDOCAINE HYDROCHLORIDE 10 MG/ML
10 INJECTION, SOLUTION EPIDURAL; INFILTRATION; INTRACAUDAL; PERINEURAL
Status: CANCELLED | OUTPATIENT
Start: 2018-01-01 | End: 2018-01-01

## 2018-01-01 RX ORDER — DEXTROMETHORPHAN/PSEUDOEPHED 2.5-7.5/.8
1.2 DROPS ORAL
Status: DISCONTINUED | OUTPATIENT
Start: 2018-01-01 | End: 2018-01-01 | Stop reason: HOSPADM

## 2018-01-01 RX ORDER — ALBUMIN HUMAN 50 G/1000ML
50 SOLUTION INTRAVENOUS
Status: DISCONTINUED | OUTPATIENT
Start: 2018-01-01 | End: 2018-01-01

## 2018-01-01 RX ORDER — MIDAZOLAM HYDROCHLORIDE 1 MG/ML
.25-5 INJECTION, SOLUTION INTRAMUSCULAR; INTRAVENOUS
Status: DISCONTINUED | OUTPATIENT
Start: 2018-01-01 | End: 2018-01-01 | Stop reason: HOSPADM

## 2018-01-01 RX ORDER — LACTULOSE 10 G/15ML
30 SOLUTION ORAL; RECTAL 3 TIMES DAILY
Qty: 4000 ML | Refills: 2 | Status: SHIPPED | OUTPATIENT
Start: 2018-01-01 | End: 2018-01-01

## 2018-01-01 RX ORDER — OXYCODONE HYDROCHLORIDE 5 MG/1
5 TABLET ORAL AS NEEDED
Status: DISCONTINUED | OUTPATIENT
Start: 2018-01-01 | End: 2018-01-01 | Stop reason: HOSPADM

## 2018-01-01 RX ORDER — GLYCOPYRROLATE 0.2 MG/ML
INJECTION INTRAMUSCULAR; INTRAVENOUS AS NEEDED
Status: DISCONTINUED | OUTPATIENT
Start: 2018-01-01 | End: 2018-01-01 | Stop reason: HOSPADM

## 2018-01-01 RX ORDER — PROPOFOL 10 MG/ML
INJECTION, EMULSION INTRAVENOUS
Status: DISCONTINUED | OUTPATIENT
Start: 2018-01-01 | End: 2018-01-01 | Stop reason: HOSPADM

## 2018-01-01 RX ORDER — HEPARIN SODIUM 5000 [USP'U]/ML
5000 INJECTION, SOLUTION INTRAVENOUS; SUBCUTANEOUS EVERY 12 HOURS
Status: DISCONTINUED | OUTPATIENT
Start: 2018-01-01 | End: 2018-01-01 | Stop reason: HOSPADM

## 2018-01-01 RX ORDER — FUROSEMIDE 40 MG/1
40 TABLET ORAL DAILY
Status: DISCONTINUED | OUTPATIENT
Start: 2018-01-01 | End: 2018-01-01

## 2018-01-01 RX ADMIN — Medication 10 ML: at 14:57

## 2018-01-01 RX ADMIN — ALBUTEROL SULFATE 2.5 MG: 2.5 SOLUTION RESPIRATORY (INHALATION) at 03:15

## 2018-01-01 RX ADMIN — ALBUMIN (HUMAN) 25 G: 0.25 INJECTION, SOLUTION INTRAVENOUS at 05:12

## 2018-01-01 RX ADMIN — LACTULOSE 20 G: 20 SOLUTION ORAL at 08:37

## 2018-01-01 RX ADMIN — FENTANYL CITRATE 25 MCG: 50 INJECTION, SOLUTION INTRAMUSCULAR; INTRAVENOUS at 13:13

## 2018-01-01 RX ADMIN — Medication 10 ML: at 14:09

## 2018-01-01 RX ADMIN — ACETAMINOPHEN 650 MG: 325 TABLET ORAL at 06:23

## 2018-01-01 RX ADMIN — FAMOTIDINE 20 MG: 20 TABLET ORAL at 08:44

## 2018-01-01 RX ADMIN — ONDANSETRON 4 MG: 2 INJECTION INTRAMUSCULAR; INTRAVENOUS at 11:55

## 2018-01-01 RX ADMIN — ALBUMIN (HUMAN) 25 G: 0.25 INJECTION, SOLUTION INTRAVENOUS at 23:08

## 2018-01-01 RX ADMIN — FENTANYL CITRATE 25 MCG: 50 INJECTION, SOLUTION INTRAMUSCULAR; INTRAVENOUS at 16:10

## 2018-01-01 RX ADMIN — ALBUMIN (HUMAN) 12.5 G: 0.25 INJECTION, SOLUTION INTRAVENOUS at 17:08

## 2018-01-01 RX ADMIN — LIDOCAINE HYDROCHLORIDE 5 ML: 10 INJECTION, SOLUTION EPIDURAL; INFILTRATION; INTRACAUDAL; PERINEURAL at 10:39

## 2018-01-01 RX ADMIN — ALBUMIN (HUMAN) 12.5 G: 0.25 INJECTION, SOLUTION INTRAVENOUS at 17:00

## 2018-01-01 RX ADMIN — LIDOCAINE HYDROCHLORIDE 60 MG: 20 INJECTION, SOLUTION EPIDURAL; INFILTRATION; INTRACAUDAL; PERINEURAL at 13:13

## 2018-01-01 RX ADMIN — STANDARDIZED SENNA CONCENTRATE AND DOCUSATE SODIUM 1 TABLET: 8.6; 5 TABLET, FILM COATED ORAL at 08:58

## 2018-01-01 RX ADMIN — INFLUENZA VIRUS VACCINE 0.5 ML: 15; 15; 15; 15 SUSPENSION INTRAMUSCULAR at 10:53

## 2018-01-01 RX ADMIN — SODIUM BICARBONATE 325 MG: 650 TABLET ORAL at 18:03

## 2018-01-01 RX ADMIN — PROPOFOL 40 MG: 10 INJECTION, EMULSION INTRAVENOUS at 16:30

## 2018-01-01 RX ADMIN — INFLUENZA A VIRUSA/MICHIGAN/45/2015 X-275 (H1N1) ANTIGEN (FORMALDEHYDE INACTIVATED), INFLUENZA A VIRUS A/HONG KONG/4801/2014 X-263B (H3N2) ANTIGEN (FORMALDEHYDE INACTIVATED), AND INFLUENZA B VIRUS B/BRISBANE/60/2008 ANTIGEN (FORMALDEHYDE INACTIVATED) 0.5 ML: 60; 60; 60 INJECTION, SUSPENSION INTRAMUSCULAR at 11:28

## 2018-01-01 RX ADMIN — DEXTROSE MONOHYDRATE, SODIUM CHLORIDE, AND POTASSIUM CHLORIDE 75 ML/HR: 50; 4.5; 1.49 INJECTION, SOLUTION INTRAVENOUS at 00:44

## 2018-01-01 RX ADMIN — ACETAMINOPHEN 650 MG: 325 TABLET ORAL at 22:49

## 2018-01-01 RX ADMIN — OXYCODONE HYDROCHLORIDE 5 MG: 5 TABLET ORAL at 19:11

## 2018-01-01 RX ADMIN — HEPARIN SODIUM 5000 UNITS: 5000 INJECTION INTRAVENOUS; SUBCUTANEOUS at 17:47

## 2018-01-01 RX ADMIN — POLYETHYLENE GLYCOL (3350) 17 G: 17 POWDER, FOR SOLUTION ORAL at 19:29

## 2018-01-01 RX ADMIN — Medication 10 ML: at 15:42

## 2018-01-01 RX ADMIN — Medication 10 ML: at 06:31

## 2018-01-01 RX ADMIN — Medication 10 ML: at 14:07

## 2018-01-01 RX ADMIN — HEPARIN SODIUM 5000 UNITS: 5000 INJECTION INTRAVENOUS; SUBCUTANEOUS at 05:34

## 2018-01-01 RX ADMIN — PANTOPRAZOLE SODIUM 40 MG: 40 TABLET, DELAYED RELEASE ORAL at 06:28

## 2018-01-01 RX ADMIN — Medication 10 ML: at 06:59

## 2018-01-01 RX ADMIN — POLYETHYLENE GLYCOL (3350) 17 G: 17 POWDER, FOR SOLUTION ORAL at 22:16

## 2018-01-01 RX ADMIN — PROPOFOL 100 MCG/KG/MIN: 10 INJECTION, EMULSION INTRAVENOUS at 16:30

## 2018-01-01 RX ADMIN — FENTANYL CITRATE 25 MCG: 50 INJECTION, SOLUTION INTRAMUSCULAR; INTRAVENOUS at 13:44

## 2018-01-01 RX ADMIN — ALBUMIN (HUMAN) 12.5 G: 0.25 INJECTION, SOLUTION INTRAVENOUS at 11:52

## 2018-01-01 RX ADMIN — ALBUMIN (HUMAN) 12.5 G: 0.25 INJECTION, SOLUTION INTRAVENOUS at 23:52

## 2018-01-01 RX ADMIN — POLYETHYLENE GLYCOL (3350) 17 G: 17 POWDER, FOR SOLUTION ORAL at 23:26

## 2018-01-01 RX ADMIN — ROCURONIUM BROMIDE 25 MG: 10 INJECTION, SOLUTION INTRAVENOUS at 13:15

## 2018-01-01 RX ADMIN — Medication 10 ML: at 23:09

## 2018-01-01 RX ADMIN — FENTANYL CITRATE 50 MCG: 50 INJECTION, SOLUTION INTRAMUSCULAR; INTRAVENOUS at 14:45

## 2018-01-01 RX ADMIN — ACETAMINOPHEN 650 MG: 325 TABLET ORAL at 05:11

## 2018-01-01 RX ADMIN — FAMOTIDINE 20 MG: 20 TABLET ORAL at 08:41

## 2018-01-01 RX ADMIN — Medication 10 ML: at 06:25

## 2018-01-01 RX ADMIN — OXYCODONE HYDROCHLORIDE 5 MG: 5 TABLET ORAL at 17:15

## 2018-01-01 RX ADMIN — MIDAZOLAM HYDROCHLORIDE 1 MG: 1 INJECTION, SOLUTION INTRAMUSCULAR; INTRAVENOUS at 13:44

## 2018-01-01 RX ADMIN — OXYCODONE HYDROCHLORIDE 5 MG: 5 TABLET ORAL at 02:04

## 2018-01-01 RX ADMIN — ALBUMIN (HUMAN) 12.5 G: 0.25 INJECTION, SOLUTION INTRAVENOUS at 00:17

## 2018-01-01 RX ADMIN — IRON SUCROSE 200 MG: 20 INJECTION, SOLUTION INTRAVENOUS at 07:57

## 2018-01-01 RX ADMIN — Medication 10 ML: at 05:33

## 2018-01-01 RX ADMIN — Medication 10 ML: at 23:02

## 2018-01-01 RX ADMIN — HEPARIN SODIUM 5000 UNITS: 5000 INJECTION INTRAVENOUS; SUBCUTANEOUS at 18:36

## 2018-01-01 RX ADMIN — LACTULOSE 30 G: 20 SOLUTION ORAL at 08:58

## 2018-01-01 RX ADMIN — Medication 2 G: at 04:53

## 2018-01-01 RX ADMIN — HEPARIN SODIUM 5000 UNITS: 5000 INJECTION INTRAVENOUS; SUBCUTANEOUS at 17:15

## 2018-01-01 RX ADMIN — FAMOTIDINE 20 MG: 10 INJECTION INTRAVENOUS at 11:55

## 2018-01-01 RX ADMIN — SODIUM CHLORIDE 25 ML/HR: 900 INJECTION, SOLUTION INTRAVENOUS at 13:15

## 2018-01-01 RX ADMIN — SODIUM BICARBONATE 325 MG: 650 TABLET ORAL at 17:51

## 2018-01-01 RX ADMIN — PANTOPRAZOLE SODIUM 40 MG: 40 TABLET, DELAYED RELEASE ORAL at 13:13

## 2018-01-01 RX ADMIN — FENTANYL CITRATE 25 MCG: 50 INJECTION, SOLUTION INTRAMUSCULAR; INTRAVENOUS at 13:52

## 2018-01-01 RX ADMIN — LACTULOSE 30 G: 20 SOLUTION ORAL at 22:55

## 2018-01-01 RX ADMIN — ALBUMIN (HUMAN) 12.5 G: 0.25 INJECTION, SOLUTION INTRAVENOUS at 11:23

## 2018-01-01 RX ADMIN — FENTANYL CITRATE 25 MCG: 50 INJECTION, SOLUTION INTRAMUSCULAR; INTRAVENOUS at 16:18

## 2018-01-01 RX ADMIN — POLYETHYLENE GLYCOL (3350) 17 G: 17 POWDER, FOR SOLUTION ORAL at 22:12

## 2018-01-01 RX ADMIN — LACTULOSE 30 G: 20 SOLUTION ORAL at 15:12

## 2018-01-01 RX ADMIN — ALBUMIN (HUMAN) 25 G: 0.25 INJECTION, SOLUTION INTRAVENOUS at 18:03

## 2018-01-01 RX ADMIN — STANDARDIZED SENNA CONCENTRATE AND DOCUSATE SODIUM 1 TABLET: 8.6; 5 TABLET, FILM COATED ORAL at 08:44

## 2018-01-01 RX ADMIN — BISACODYL 5 MG: 5 TABLET, COATED ORAL at 18:41

## 2018-01-01 RX ADMIN — LACTULOSE 20 G: 20 SOLUTION ORAL at 18:36

## 2018-01-01 RX ADMIN — Medication 40 MCG: at 14:22

## 2018-01-01 RX ADMIN — SODIUM BICARBONATE 325 MG: 650 TABLET ORAL at 08:23

## 2018-01-01 RX ADMIN — LACTULOSE 20 G: 20 SOLUTION ORAL at 08:24

## 2018-01-01 RX ADMIN — Medication 2 G: at 13:51

## 2018-01-01 RX ADMIN — FENTANYL CITRATE 12.5 MCG: 50 INJECTION, SOLUTION INTRAMUSCULAR; INTRAVENOUS at 10:49

## 2018-01-01 RX ADMIN — PROPOFOL 20 MG: 10 INJECTION, EMULSION INTRAVENOUS at 16:36

## 2018-01-01 RX ADMIN — BISACODYL 5 MG: 5 TABLET, COATED ORAL at 06:27

## 2018-01-01 RX ADMIN — LACTULOSE 20 G: 20 SOLUTION ORAL at 09:15

## 2018-01-01 RX ADMIN — Medication 10 ML: at 21:07

## 2018-01-01 RX ADMIN — OXYCODONE HYDROCHLORIDE 5 MG: 5 TABLET ORAL at 07:27

## 2018-01-01 RX ADMIN — MIDAZOLAM HYDROCHLORIDE 1 MG: 1 INJECTION, SOLUTION INTRAMUSCULAR; INTRAVENOUS at 13:52

## 2018-01-01 RX ADMIN — SODIUM BICARBONATE 325 MG: 650 TABLET ORAL at 09:45

## 2018-01-01 RX ADMIN — Medication 10 ML: at 00:16

## 2018-01-01 RX ADMIN — ACETAMINOPHEN 650 MG: 325 TABLET ORAL at 00:16

## 2018-01-01 RX ADMIN — MORPHINE SULFATE 2 MG: 10 INJECTION INTRAVENOUS at 17:42

## 2018-01-01 RX ADMIN — LACTULOSE 20 G: 20 SOLUTION ORAL at 08:28

## 2018-01-01 RX ADMIN — FUROSEMIDE 20 MG: 10 INJECTION, SOLUTION INTRAMUSCULAR; INTRAVENOUS at 11:51

## 2018-01-01 RX ADMIN — ALBUMIN (HUMAN) 25 G: 0.25 INJECTION, SOLUTION INTRAVENOUS at 18:36

## 2018-01-01 RX ADMIN — ROCURONIUM BROMIDE 20 MG: 10 INJECTION, SOLUTION INTRAVENOUS at 13:30

## 2018-01-01 RX ADMIN — ACETAMINOPHEN 650 MG: 325 TABLET ORAL at 18:26

## 2018-01-01 RX ADMIN — STANDARDIZED SENNA CONCENTRATE AND DOCUSATE SODIUM 1 TABLET: 8.6; 5 TABLET, FILM COATED ORAL at 18:55

## 2018-01-01 RX ADMIN — PROPOFOL 40 MG: 10 INJECTION, EMULSION INTRAVENOUS at 16:32

## 2018-01-01 RX ADMIN — ALBUMIN (HUMAN) 25 G: 0.25 INJECTION, SOLUTION INTRAVENOUS at 12:15

## 2018-01-01 RX ADMIN — SODIUM CHLORIDE 1000 ML: 900 INJECTION, SOLUTION INTRAVENOUS at 15:18

## 2018-01-01 RX ADMIN — ALBUMIN (HUMAN) 25 G: 0.25 INJECTION, SOLUTION INTRAVENOUS at 11:22

## 2018-01-01 RX ADMIN — ACETAMINOPHEN 650 MG: 325 TABLET ORAL at 19:51

## 2018-01-01 RX ADMIN — FENTANYL CITRATE 25 MCG: 50 INJECTION, SOLUTION INTRAMUSCULAR; INTRAVENOUS at 13:47

## 2018-01-01 RX ADMIN — POLYETHYLENE GLYCOL (3350) 17 G: 17 POWDER, FOR SOLUTION ORAL at 06:10

## 2018-01-01 RX ADMIN — POLYETHYLENE GLYCOL (3350) 17 G: 17 POWDER, FOR SOLUTION ORAL at 23:27

## 2018-01-01 RX ADMIN — Medication 10 ML: at 13:15

## 2018-01-01 RX ADMIN — ALBUMIN (HUMAN) 25 G: 0.25 INJECTION, SOLUTION INTRAVENOUS at 18:00

## 2018-01-01 RX ADMIN — ONDANSETRON 4 MG: 2 INJECTION INTRAMUSCULAR; INTRAVENOUS at 15:15

## 2018-01-01 RX ADMIN — ROCURONIUM BROMIDE 5 MG: 10 INJECTION, SOLUTION INTRAVENOUS at 13:13

## 2018-01-01 RX ADMIN — MORPHINE SULFATE 2 MG: 10 INJECTION INTRAVENOUS at 17:03

## 2018-01-01 RX ADMIN — FENTANYL CITRATE 12.5 MCG: 50 INJECTION, SOLUTION INTRAMUSCULAR; INTRAVENOUS at 10:42

## 2018-01-01 RX ADMIN — Medication 10 ML: at 23:29

## 2018-01-01 RX ADMIN — STANDARDIZED SENNA CONCENTRATE AND DOCUSATE SODIUM 1 TABLET: 8.6; 5 TABLET, FILM COATED ORAL at 08:41

## 2018-01-01 RX ADMIN — MORPHINE SULFATE 2 MG: 2 INJECTION, SOLUTION INTRAMUSCULAR; INTRAVENOUS at 05:34

## 2018-01-01 RX ADMIN — LACTULOSE 30 G: 20 SOLUTION ORAL at 08:44

## 2018-01-01 RX ADMIN — STANDARDIZED SENNA CONCENTRATE AND DOCUSATE SODIUM 1 TABLET: 8.6; 5 TABLET, FILM COATED ORAL at 17:12

## 2018-01-01 RX ADMIN — STANDARDIZED SENNA CONCENTRATE AND DOCUSATE SODIUM 1 TABLET: 8.6; 5 TABLET, FILM COATED ORAL at 20:09

## 2018-01-01 RX ADMIN — LACTULOSE 20 G: 20 SOLUTION ORAL at 17:14

## 2018-01-01 RX ADMIN — LIDOCAINE HYDROCHLORIDE 10 ML: 10 INJECTION, SOLUTION EPIDURAL; INFILTRATION; INTRACAUDAL; PERINEURAL at 14:49

## 2018-01-01 RX ADMIN — ALBUMIN (HUMAN) 25 G: 0.25 INJECTION, SOLUTION INTRAVENOUS at 17:15

## 2018-01-01 RX ADMIN — Medication 2 G: at 20:47

## 2018-01-01 RX ADMIN — LACTULOSE 20 G: 20 SOLUTION ORAL at 18:03

## 2018-01-01 RX ADMIN — OXYCODONE HYDROCHLORIDE 5 MG: 5 TABLET ORAL at 11:05

## 2018-01-01 RX ADMIN — ACETAMINOPHEN 650 MG: 325 TABLET ORAL at 03:42

## 2018-01-01 RX ADMIN — ALBUMIN (HUMAN) 12.5 G: 0.25 INJECTION, SOLUTION INTRAVENOUS at 12:33

## 2018-01-01 RX ADMIN — Medication 10 ML: at 18:36

## 2018-01-01 RX ADMIN — Medication 40 MCG: at 14:39

## 2018-01-01 RX ADMIN — FENTANYL CITRATE 25 MCG: 50 INJECTION, SOLUTION INTRAMUSCULAR; INTRAVENOUS at 15:03

## 2018-01-01 RX ADMIN — LACTULOSE 20 G: 20 SOLUTION ORAL at 17:47

## 2018-01-01 RX ADMIN — ALBUMIN (HUMAN) 25 G: 0.25 INJECTION, SOLUTION INTRAVENOUS at 23:54

## 2018-01-01 RX ADMIN — PANTOPRAZOLE SODIUM 40 MG: 40 TABLET, DELAYED RELEASE ORAL at 06:18

## 2018-01-01 RX ADMIN — ACETAMINOPHEN 650 MG: 325 TABLET ORAL at 12:39

## 2018-01-01 RX ADMIN — ALBUMIN (HUMAN) 12.5 G: 0.25 INJECTION, SOLUTION INTRAVENOUS at 18:54

## 2018-01-01 RX ADMIN — ALBUMIN (HUMAN) 25 G: 0.25 INJECTION, SOLUTION INTRAVENOUS at 06:59

## 2018-01-01 RX ADMIN — PANTOPRAZOLE SODIUM 40 MG: 40 TABLET, DELAYED RELEASE ORAL at 07:02

## 2018-01-01 RX ADMIN — Medication 10 ML: at 00:44

## 2018-01-01 RX ADMIN — MORPHINE SULFATE 2 MG: 10 INJECTION INTRAVENOUS at 16:54

## 2018-01-01 RX ADMIN — Medication 10 ML: at 06:24

## 2018-01-01 RX ADMIN — FAMOTIDINE 20 MG: 20 TABLET ORAL at 20:09

## 2018-01-01 RX ADMIN — STANDARDIZED SENNA CONCENTRATE AND DOCUSATE SODIUM 1 TABLET: 8.6; 5 TABLET, FILM COATED ORAL at 17:08

## 2018-01-01 RX ADMIN — ALBUMIN (HUMAN) 25 G: 0.25 INJECTION, SOLUTION INTRAVENOUS at 00:16

## 2018-01-01 RX ADMIN — POLYETHYLENE GLYCOL (3350) 17 G: 17 POWDER, FOR SOLUTION ORAL at 07:08

## 2018-01-01 RX ADMIN — LACTULOSE 30 G: 20 SOLUTION ORAL at 18:55

## 2018-01-01 RX ADMIN — Medication 10 ML: at 13:23

## 2018-01-01 RX ADMIN — Medication 10 ML: at 06:46

## 2018-01-01 RX ADMIN — FENTANYL CITRATE 25 MCG: 50 INJECTION, SOLUTION INTRAMUSCULAR; INTRAVENOUS at 11:00

## 2018-01-01 RX ADMIN — LACTULOSE 20 G: 20 SOLUTION ORAL at 17:55

## 2018-01-01 RX ADMIN — POLYETHYLENE GLYCOL (3350) 17 G: 17 POWDER, FOR SOLUTION ORAL at 22:11

## 2018-01-01 RX ADMIN — SUCCINYLCHOLINE CHLORIDE 120 MG: 20 INJECTION INTRAMUSCULAR; INTRAVENOUS at 13:13

## 2018-01-01 RX ADMIN — FENTANYL CITRATE 100 MCG: 50 INJECTION, SOLUTION INTRAMUSCULAR; INTRAVENOUS at 12:43

## 2018-01-01 RX ADMIN — Medication 10 ML: at 15:32

## 2018-01-01 RX ADMIN — ONDANSETRON 4 MG: 2 INJECTION INTRAMUSCULAR; INTRAVENOUS at 18:56

## 2018-01-01 RX ADMIN — FUROSEMIDE 40 MG: 10 INJECTION, SOLUTION INTRAMUSCULAR; INTRAVENOUS at 14:07

## 2018-01-01 RX ADMIN — SODIUM BICARBONATE 325 MG: 650 TABLET ORAL at 17:15

## 2018-01-01 RX ADMIN — PROPOFOL 150 MG: 10 INJECTION, EMULSION INTRAVENOUS at 13:13

## 2018-01-01 RX ADMIN — LIDOCAINE HYDROCHLORIDE 20 ML: 20 INJECTION, SOLUTION EPIDURAL; INFILTRATION; INTRACAUDAL; PERINEURAL at 14:00

## 2018-01-01 RX ADMIN — LACTULOSE 20 G: 20 SOLUTION ORAL at 17:51

## 2018-01-01 RX ADMIN — MIDAZOLAM 0.5 MG: 1 INJECTION INTRAMUSCULAR; INTRAVENOUS at 10:49

## 2018-01-01 RX ADMIN — Medication 10 ML: at 22:02

## 2018-01-01 RX ADMIN — ALBUMIN (HUMAN) 12.5 G: 0.25 INJECTION, SOLUTION INTRAVENOUS at 07:04

## 2018-01-01 RX ADMIN — PANTOPRAZOLE SODIUM 40 MG: 40 TABLET, DELAYED RELEASE ORAL at 06:39

## 2018-01-01 RX ADMIN — FENTANYL CITRATE 25 MCG: 50 INJECTION, SOLUTION INTRAMUSCULAR; INTRAVENOUS at 13:54

## 2018-01-01 RX ADMIN — OXYCODONE HYDROCHLORIDE 5 MG: 5 TABLET ORAL at 13:16

## 2018-01-01 RX ADMIN — LACTULOSE 30 G: 20 SOLUTION ORAL at 22:00

## 2018-01-01 RX ADMIN — ALBUMIN (HUMAN) 25 G: 0.25 INJECTION, SOLUTION INTRAVENOUS at 11:24

## 2018-01-01 RX ADMIN — GLYCOPYRROLATE 0.2 MG: 0.2 INJECTION INTRAMUSCULAR; INTRAVENOUS at 15:33

## 2018-01-01 RX ADMIN — POLYETHYLENE GLYCOL (3350) 17 G: 17 POWDER, FOR SOLUTION ORAL at 23:25

## 2018-01-01 RX ADMIN — ALBUMIN (HUMAN) 12.5 G: 0.25 INJECTION, SOLUTION INTRAVENOUS at 15:31

## 2018-01-01 RX ADMIN — OXYCODONE HYDROCHLORIDE 5 MG: 5 TABLET ORAL at 07:02

## 2018-01-01 RX ADMIN — PHYTONADIONE 5 MG: 10 INJECTION, EMULSION INTRAMUSCULAR; INTRAVENOUS; SUBCUTANEOUS at 08:37

## 2018-01-01 RX ADMIN — MIDAZOLAM 1 MG: 1 INJECTION INTRAMUSCULAR; INTRAVENOUS at 10:54

## 2018-01-01 RX ADMIN — SODIUM BICARBONATE 325 MG: 650 TABLET ORAL at 09:16

## 2018-01-01 RX ADMIN — LACTULOSE 30 G: 20 SOLUTION ORAL at 08:41

## 2018-01-01 RX ADMIN — MIDAZOLAM HYDROCHLORIDE 5 MG: 1 INJECTION, SOLUTION INTRAMUSCULAR; INTRAVENOUS at 12:43

## 2018-01-01 RX ADMIN — ALBUMIN (HUMAN) 25 G: 0.25 INJECTION, SOLUTION INTRAVENOUS at 05:34

## 2018-01-01 RX ADMIN — ALBUMIN (HUMAN) 25 G: 0.25 INJECTION, SOLUTION INTRAVENOUS at 06:46

## 2018-01-01 RX ADMIN — Medication 10 ML: at 07:02

## 2018-01-01 RX ADMIN — MIDAZOLAM 0.5 MG: 1 INJECTION INTRAMUSCULAR; INTRAVENOUS at 10:37

## 2018-01-01 RX ADMIN — FUROSEMIDE 40 MG: 40 TABLET ORAL at 16:54

## 2018-01-01 RX ADMIN — SODIUM CHLORIDE, SODIUM LACTATE, POTASSIUM CHLORIDE, AND CALCIUM CHLORIDE 75 ML/HR: 600; 310; 30; 20 INJECTION, SOLUTION INTRAVENOUS at 16:24

## 2018-01-01 RX ADMIN — FUROSEMIDE 40 MG: 40 TABLET ORAL at 06:19

## 2018-01-01 RX ADMIN — FENTANYL CITRATE 25 MCG: 50 INJECTION, SOLUTION INTRAMUSCULAR; INTRAVENOUS at 16:29

## 2018-01-01 RX ADMIN — FENTANYL CITRATE 25 MCG: 50 INJECTION, SOLUTION INTRAMUSCULAR; INTRAVENOUS at 13:49

## 2018-01-01 RX ADMIN — ONDANSETRON 4 MG: 2 INJECTION INTRAMUSCULAR; INTRAVENOUS at 16:31

## 2018-01-01 RX ADMIN — MIDAZOLAM HYDROCHLORIDE 1 MG: 1 INJECTION, SOLUTION INTRAMUSCULAR; INTRAVENOUS at 13:46

## 2018-01-01 RX ADMIN — ALBUMIN (HUMAN) 12.5 G: 0.25 INJECTION, SOLUTION INTRAVENOUS at 06:15

## 2018-01-01 RX ADMIN — SODIUM CHLORIDE, SODIUM LACTATE, POTASSIUM CHLORIDE, AND CALCIUM CHLORIDE 75 ML/HR: 600; 310; 30; 20 INJECTION, SOLUTION INTRAVENOUS at 04:51

## 2018-01-01 RX ADMIN — IRON SUCROSE 200 MG: 20 INJECTION, SOLUTION INTRAVENOUS at 08:36

## 2018-01-01 RX ADMIN — PROPOFOL 50 MG: 10 INJECTION, EMULSION INTRAVENOUS at 13:42

## 2018-01-01 RX ADMIN — PANTOPRAZOLE SODIUM 40 MG: 40 TABLET, DELAYED RELEASE ORAL at 05:34

## 2018-01-01 RX ADMIN — POLYETHYLENE GLYCOL (3350) 17 G: 17 POWDER, FOR SOLUTION ORAL at 06:13

## 2018-01-01 RX ADMIN — FERRIC CARBOXYMALTOSE INJECTION 750 MG: 50 INJECTION, SOLUTION INTRAVENOUS at 13:49

## 2018-01-01 RX ADMIN — ALBUMIN (HUMAN) 12.5 G: 0.25 INJECTION, SOLUTION INTRAVENOUS at 15:39

## 2018-01-01 RX ADMIN — FENTANYL CITRATE 25 MCG: 50 INJECTION, SOLUTION INTRAMUSCULAR; INTRAVENOUS at 16:23

## 2018-01-01 RX ADMIN — ALBUMIN (HUMAN) 25 G: 0.25 INJECTION, SOLUTION INTRAVENOUS at 11:57

## 2018-01-01 RX ADMIN — SODIUM CHLORIDE, SODIUM LACTATE, POTASSIUM CHLORIDE, AND CALCIUM CHLORIDE 125 ML/HR: 600; 310; 30; 20 INJECTION, SOLUTION INTRAVENOUS at 12:42

## 2018-01-01 RX ADMIN — ALBUMIN HUMAN 250 ML: 50 SOLUTION INTRAVENOUS at 14:40

## 2018-01-01 RX ADMIN — ALBUMIN (HUMAN) 25 G: 0.25 INJECTION, SOLUTION INTRAVENOUS at 00:28

## 2018-01-01 RX ADMIN — Medication 10 ML: at 05:12

## 2018-01-01 RX ADMIN — FAMOTIDINE 20 MG: 20 TABLET ORAL at 08:58

## 2018-01-01 RX ADMIN — DEXAMETHASONE SODIUM PHOSPHATE 4 MG: 4 INJECTION, SOLUTION INTRA-ARTICULAR; INTRALESIONAL; INTRAMUSCULAR; INTRAVENOUS; SOFT TISSUE at 13:20

## 2018-01-01 RX ADMIN — HEPARIN SODIUM 5000 UNITS: 5000 INJECTION INTRAVENOUS; SUBCUTANEOUS at 06:46

## 2018-01-01 RX ADMIN — Medication 2 G: at 13:22

## 2018-01-01 RX ADMIN — OXYCODONE HYDROCHLORIDE 5 MG: 5 TABLET ORAL at 21:05

## 2018-01-01 RX ADMIN — LACTULOSE 30 G: 20 SOLUTION ORAL at 22:20

## 2018-01-01 RX ADMIN — LACTULOSE 30 G: 20 SOLUTION ORAL at 16:12

## 2018-01-01 RX ADMIN — Medication 10 ML: at 06:00

## 2018-01-01 RX ADMIN — Medication 10 ML: at 00:28

## 2018-01-01 RX ADMIN — PANTOPRAZOLE SODIUM 40 MG: 40 TABLET, DELAYED RELEASE ORAL at 06:46

## 2018-01-01 RX ADMIN — NEOSTIGMINE METHYLSULFATE 2 MG: 1 INJECTION INTRAVENOUS at 15:33

## 2018-01-01 RX ADMIN — LACTULOSE 20 G: 20 SOLUTION ORAL at 06:03

## 2018-01-01 RX ADMIN — POLYETHYLENE GLYCOL (3350) 17 G: 17 POWDER, FOR SOLUTION ORAL at 07:07

## 2018-01-01 RX ADMIN — SODIUM BICARBONATE 325 MG: 650 TABLET ORAL at 17:54

## 2018-01-01 RX ADMIN — POLYETHYLENE GLYCOL (3350) 17 G: 17 POWDER, FOR SOLUTION ORAL at 19:28

## 2018-01-01 RX ADMIN — ALBUMIN (HUMAN) 25 G: 0.25 INJECTION, SOLUTION INTRAVENOUS at 17:47

## 2018-01-26 NOTE — PROGRESS NOTES
[facility] Progress Note    1100 Pt admit to [address] [place] [address] for high dose flu vaccine ambulatory in stable condition. Assessment completed. No new concerns voiced. Flu shot given in left deltoid per pt request.    Visit Vitals    /76 (BP 1 Location: Left arm)    Pulse 64    Temp 97.2 °F (36.2 °C)    Breastfeeding No       Medications:  High dose flu vaccine    1135 Pt tolerated treatment well. D/c home ambulatory in no distress. No further appts needed.

## 2018-01-26 NOTE — PROGRESS NOTES
[id number] [facility]  [phone]    Hematology / Oncology Progress Note    Reason for Visit:   Dion Stephen is a 77 y.o. female who comes in for follow up of DVT. History of Present Illness:   Ms. Yoly Lloyd is a 68 y/o female with h/o bladder cancer, ROBBINS cirrhosis who comes in for follow up of LLE DVT. She was recently admitted to the hospital for cellulitis and DVT. Although the patient has chronic BLE edema, she had noted LLE redness, pain and swelling which was new and worsening over the past few days. Her PCP started patient on Keflex, but the symptoms worsened. She reported to the ER and ultrasound revealed peroneal DVT. Patient denies any recent surgeries, fractures, long trips or periods of immobilization, hormone replacement therapy, tobacco use. No prior personal h/o or family h/o thrombosis. Regarding ROBBINS cirrhosis, the patient states she was diagnosed several years ago after having an episode of hematemesis, was found to have esophageal varices. At that time, she had also seen a Hematologist in [place] for workup of thrombocytopenia. Patient denies ever having banding for the varices or medication with Propranolol. Labs here reveal PLT range from 44 - 57. She endorses easy bruising, bleeding, but denies current/recent epistaxis, gingival bleeding. No melena/hematochezia, hematuria. Today, patient comes in for follow up. She states that after hospital discharge, she completed another 5 days of oral antibiotics. She states her LLE erythema and swelling has improved greatly. She also underwent repeat testing of the LLE in the hospital on 12/20/17 which was negative for DVT.     Past Medical History:   Diagnosis Date    Anemia     Cirrhosis ([address]     ROBBINS     GERD (gastroesophageal reflux disease)     H/O bone density study 10/28/2016    Osteopenia    Hx of bone density study 08/09/06    Normal x2    Hypermenorrhea     Hypertension     Leiomyoma of uterus, unspecified     Menopausal syndrome     Osteopenia 2016    Thrombocytopenia ([facility]     Thromboembolus ([facility]     Transitional cell bladder cancer ([facility]     Has been cleared by urology      Past Surgical History:   Procedure Laterality Date    HX HYSTEROSCOPY  03/1996    w/ D&C    HX OTHER SURGICAL  02/19/10    tumor in bladder removed x2 in 2010    HX OTHER SURGICAL  03/25/10    Liver Biopsy--Cirrhosis    HX TUBAL LIGATION      HX UROLOGICAL      tumor removed from bladder      Social History   Substance Use Topics    Smoking status: Never Smoker    Smokeless tobacco: Never Used    Alcohol use No      Family History   Problem Relation Age of Onset    Diabetes Brother      Type II    Hypertension Brother     Heart Disease Father     Heart Attack Father 64    Hypertension Father     Hypertension Mother     Osteoporosis Mother      +Hip fracture    Hypertension Sister      Current Outpatient Prescriptions   Medication Sig    furosemide (LASIX) 20 mg tablet Take 40 mg by mouth two (2) times a day.  omeprazole (PRILOSEC) 20 mg capsule Take 20 mg by mouth daily.  metoprolol (LOPRESSOR) 25 mg tablet Take 25 mg by mouth daily.  calcium-cholecalciferol, d3, (CALCIUM 600 + D) 600-125 mg-unit tab Take 1 Tab by mouth daily. No current facility-administered medications for this visit. No Known Allergies     Review of Systems: A complete review of systems was obtained, negative except as described above.     Physical Exam:     Visit Vitals    /67 (BP 1 Location: Right arm, BP Patient Position: Sitting)    Pulse 67    Temp 95.7 °F (35.4 °C) (Oral)    Ht 5' 3\" (1.6 m)    Wt 254 lb (115.2 kg)    SpO2 99%    BMI 44.99 kg/m2     ECOG PS: 1  General: No distress, obese  Eyes: PERRLA, anicteric sclerae  HENT: Atraumatic with normal appearance of ears and nose; OP clear  Neck: Supple; no thyromegaly   Lymphatic: No cervical, supraclavicular, or inguinal adenopathy  Respiratory: CTAB, normal respiratory effort  CV: Normal rate, regular rhythm, no murmurs. 1+ pitting edema in BLE. GI: Soft, nontender, nondistended, no masses, no hepatomegaly, no splenomegaly  MS: Normal gait and station. Digits without clubbing or cyanosis. Skin: No ecchymoses, or petechiae. Normal temperature, turgor, and texture. LLE with very mild erythema (much improved from prior), no TTP. Neuro/Psych: Alert, oriented, appropriate affect, normal judgment/insight       Results:     Lab Results   Component Value Date/Time    WBC 3.4 12/20/2017 03:32 AM    HGB 10.6 12/20/2017 03:32 AM    HCT 31.6 12/20/2017 03:32 AM    PLATELET 59 58/08/7135 03:32 AM    MCV 94.9 12/20/2017 03:32 AM    ABS. NEUTROPHILS 5.6 12/17/2017 02:44 AM     Lab Results   Component Value Date/Time    Sodium 140 12/20/2017 03:32 AM    Potassium 3.7 12/20/2017 03:32 AM    Chloride 107 12/20/2017 03:32 AM    CO2 25 12/20/2017 03:32 AM    Glucose 85 12/20/2017 03:32 AM    BUN 6 12/20/2017 03:32 AM    Creatinine 0.74 12/20/2017 03:32 AM    GFR est AA >60 12/20/2017 03:32 AM    GFR est non-AA >60 12/20/2017 03:32 AM    Calcium 8.0 12/20/2017 03:32 AM     Lab Results   Component Value Date/Time    Bilirubin, total 1.5 12/19/2017 03:42 AM    ALT (SGPT) 30 12/19/2017 03:42 AM    AST (SGOT) 50 12/19/2017 03:42 AM    Alk. phosphatase 116 12/19/2017 03:42 AM    Protein, total 5.5 12/19/2017 03:42 AM    Albumin 2.0 12/19/2017 03:42 AM    Globulin 3.5 12/19/2017 03:42 AM         Imaging:     LLE doppler 12/20/17:  CONCLUSION: Left lower extremity venous duplex negative for deep  venous thrombosis or thrombophlebitis in the veins visualized. Prominent lymph node noted in the left groin measuring 2.97 x 1.44 cm. Right common femoral vein is thrombus free. Assessment & Plan:   Marielos Corcoran is a 69 y.o. female with ROBBINS cirrhosis admitted with LLE DVT.     1. H/o LLE DVT: Resolved spontaneously without any anticoagulation.  Patients with cirrhosis have concomitant risks of bleeding and clotting. I do not feel hypercoag workup is warranted in this patient who has her first lifetime DVT at age 69 since testing will be low yield and results will likely not . Given patient's PLT count less than 50 and presence of esophageal varices, I feel that the risks of anticoagulation outweigh the benefits. I do recommend supportive care with elevation and gentle mobilization. Regarding IVC filter, I do not recommend placement in this patient since she has DVT limited to peroneal vein rather than extensive DVT. IVC filters are recommended in patients who require but have contraindications to anticoagulation, with evidence of extensive clot burden which are at risk for fatal PE.   -- No anticoagulation recommended. -- Follow up as needed.     2. Thrombocytopenia: 2/2 cirrhosis. Normal VitB12, Hep B/C profiles.       3. Normocytic anemia: Likely anemia of chronic disease.      4. ROBBINS cirrhosis, esophageal varices: Patient requires regular monitoring and care from a Hepatologist such as surveillance EGDs with banding, use of Propranolol and more aggressive management of fluid overload. Pt used to be a part of the ROBBINS program at [facility], but she does not wish to go back to [facility]. She is willing to see someone at [facility]. -- Scheduled to see Dr. Giang Poisson May 2018.      5. LLE cellulitis: Completed a course of antibiotics. 6. Health maintenance: Flu shot today at patient's request.    I appreciate the opportunity to participate in Ms. Paris Mckeon's care.     Signed By: Tameka Garcia MD     January 26, 2018

## 2018-03-25 NOTE — ED PROVIDER NOTES
HPI Comments: Ayaz Murray is a 77 y.o. female with past medical Hx significant for ROBBINS, CHF, ascites, and thrombocytopenia who presents to the ED accompanied by son in law and daughter with a c/o increased SOB x 2 weeks. Pt states she had abdominal and chest imaging last week which showed increased vascular changes associated with CHF as well as mild ascites. She was started on lasix with improvement until the last few days when she reports increased in weight gain and edema. Pt states she has been using nebs with no improvement to wheezing. Pt states she took 80 of lasix this morning with no improvement. Pt endorses lower abdominal pain, lower back pain, mild chest discomfort, and increased GOLDMAN. She reports 1 episode of vomiting today. She denies fever and chills. Pt's son in law is concerned about her thrombocytopenia. He states her hemorrhoids have been bleeding and wonders if anemia could be contributing to her sx. She denies f/c, urinary sx or n/v/d. PCP: BRISEYDA Mcmahon  Social Hx: never smoker, no alcohol or drug use    There are no further complaints or symptoms at this time. Note written by Javon Hunter Backwilliam, as dictated by Colonel Santosh PA-C 1:05 PM      The history is provided by the patient and a relative. No  was used.         Past Medical History:   Diagnosis Date    Anemia     Cirrhosis ([address]     ROBBINS     GERD (gastroesophageal reflux disease)     H/O bone density study 10/28/2016    Osteopenia    Hx of bone density study 08/09/06    Normal x2    Hypermenorrhea     Hypertension     Leiomyoma of uterus, unspecified     Menopausal syndrome     Osteopenia 2016    Thrombocytopenia ([address]     Thromboembolus ([address]     Transitional cell bladder cancer ([address]     Has been cleared by urology       Past Surgical History:   Procedure Laterality Date    HX HYSTEROSCOPY  03/1996    w/ D&C    HX OTHER SURGICAL  02/19/10    tumor in bladder removed x2 in 2010    HX OTHER SURGICAL  03/25/10    Liver Biopsy--Cirrhosis    HX TUBAL LIGATION      HX UROLOGICAL      tumor removed from bladder         Family History:   Problem Relation Age of Onset    Diabetes Brother      Type II    Hypertension Brother     Heart Disease Father     Heart Attack Father 64    Hypertension Father     Hypertension Mother     Osteoporosis Mother      +Hip fracture    Hypertension Sister        Social History     Social History    Marital status:      Spouse name: N/A    Number of children: N/A    Years of education: N/A     Occupational History    Not on file. Social History Main Topics    Smoking status: Never Smoker    Smokeless tobacco: Never Used    Alcohol use No    Drug use: No    Sexual activity: Not Currently     Partners: Male     Birth control/ protection: None     Other Topics Concern    Not on file     Social History Narrative         ALLERGIES: Review of patient's allergies indicates no known allergies. Review of Systems   Constitutional: Negative for chills and fever. HENT: Negative for congestion, rhinorrhea, sneezing and sore throat. Eyes: Negative for redness and visual disturbance. Respiratory: Positive for shortness of breath and wheezing. Cardiovascular: Positive for chest pain. Negative for leg swelling. Gastrointestinal: Positive for abdominal pain, anal bleeding and vomiting. Negative for nausea. Genitourinary: Negative for difficulty urinating and frequency. Musculoskeletal: Positive for back pain. Negative for myalgias and neck stiffness. Skin: Negative for rash. Neurological: Negative for dizziness, syncope, weakness and headaches. Hematological: Negative for adenopathy.        Patient Vitals for the past 12 hrs:   Temp Pulse Resp BP SpO2   03/25/18 1745 - 86 19 129/58 96 %   03/25/18 1730 - 88 21 131/51 97 %   03/25/18 1715 - 85 19 109/48 95 %   03/25/18 1700 - 77 22 130/47 93 %   03/25/18 1645 - 77 20 123/47 93 % 03/25/18 1630 - 80 22 113/48 94 %   03/25/18 1615 - 80 24 119/45 95 %   03/25/18 1611 98.4 °F (36.9 °C) - - - -   03/25/18 1600 - 81 23 116/42 96 %   03/25/18 1545 - 81 23 128/47 97 %   03/25/18 1530 - 80 22 115/50 97 %   03/25/18 1500 - 77 20 116/49 94 %   03/25/18 1445 - 78 21 118/52 97 %   03/25/18 1415 - 84 14 128/49 98 %   03/25/18 1406 99.5 °F (37.5 °C) - - - -   03/25/18 1345 - 80 23 132/61 97 %   03/25/18 1343 99 °F (37.2 °C) - - - -   03/25/18 1316 - - - - 97 %   03/25/18 1315 - 85 23 128/54 98 %   03/25/18 1258 99.5 °F (37.5 °C) 80 21 142/59 93 %              Physical Exam   Constitutional: She is oriented to person, place, and time. She appears well-developed and well-nourished. No distress. Markedly above average bmi female   HENT:   Head: Normocephalic and atraumatic. Right Ear: External ear normal.   Left Ear: External ear normal.   Nose: Nose normal.   Mouth/Throat: Oropharynx is clear and moist. No oropharyngeal exudate. Sublingual pallor   Eyes: EOM are normal. Pupils are equal, round, and reactive to light. Neck: Neck supple. No JVD present. No tracheal deviation present. Cardiovascular: Normal rate, regular rhythm, normal heart sounds and intact distal pulses. Exam reveals no gallop and no friction rub. No murmur heard. Pulmonary/Chest: Effort normal and breath sounds normal. No stridor. No respiratory distress. She has no wheezes. She has no rales. She exhibits no tenderness. Diminished breath sounds without wheezes, rales or rhonchi   Abdominal: Soft. Bowel sounds are normal. She exhibits no distension and no mass. There is no tenderness. There is no rebound and no guarding. Genitourinary: Rectal exam shows guaiac negative stool. Genitourinary Comments: Large external hemorrhoid   Musculoskeletal: Normal range of motion. She exhibits edema. She exhibits no tenderness or deformity. Chronic 3 + edema   Lymphadenopathy:     She has no cervical adenopathy.    Neurological: She is alert and oriented to person, place, and time. No cranial nerve deficit. Coordination normal.   Skin: No rash noted. No erythema. No pallor. Psychiatric: She has a normal mood and affect. Her behavior is normal.   Nursing note and vitals reviewed. MDM  Number of Diagnoses or Management Options  Anemia, unspecified type:   SOB (shortness of breath):      Amount and/or Complexity of Data Reviewed  Clinical lab tests: ordered and reviewed  Tests in the radiology section of CPT®: ordered and reviewed  Tests in the medicine section of CPT®: ordered and reviewed  Obtain history from someone other than the patient: yes (Daughter, son in law and )  Review and summarize past medical records: yes  Independent visualization of images, tracings, or specimens: yes    Patient Progress  Patient progress: stable        ED Course       Procedures  Procedure Note - Rectal Exam:   1:08 PM  Performed by: Mode Bah. GIOVANNA Fitzgerald  Chaperoned by: Jamie Angelo RN  Rectal exam performed. No stool was collected. Stool was collected and sent to the lab for Hemoccult testing. Other findings: large external hemorrhoid  The procedure took 1-15 minutes, and pt tolerated well.    1:11 PM  Discussed pt, sx, hx and current findings with Dr Javier Vaughn. He is in agreement with plan and will see pt. Will check cardiac, abd and rectal labs. Mode Bah. GIOVANNA Fitzgerald    ED EKG interpretation:1:31 PM  Rhythm: normal sinus rhythm; and regular . Rate (approx.): 83; Axis: normal; P wave: normal; QRS interval: normal ; ST/T wave: normal; Other findings: normal. This EKG was interpreted by Amador Cornejo MD ,ED Provider. CONSULT NOTE:  4:43 PM Amador Cornejo MD spoke with Dr. Nathaly Vega, Consult for Hematology/Oncology. Discussed available diagnostic tests and clinical findings. He will arrange for pt to have out patient transfusion. 4:45 PM   Dr Javier Vaughn in to discuss current findings with pt and pt's family.  Will discharge pt home to follow with Dr Gamaliel Teixeira and get transfusion at [facility]. GIOVANNA Fitzgerald      5:50 PM   Blood bank states they can not type and cross for upcoming infusion. If pt is discharged, pt will need type and cross at transfusion time. Information conveyed to pt and family. Pt in agreement with plan and would like to go home  Epifanio Bowens. GIOVANNA Fitzgerald    LABORATORY TESTS:  Recent Results (from the past 12 hour(s))   CBC WITH AUTOMATED DIFF    Collection Time: 03/25/18  1:24 PM   Result Value Ref Range    WBC 5.5 3.6 - 11.0 K/uL    RBC 2.68 (L) 3.80 - 5.20 M/uL    HGB 7.7 (L) 11.5 - 16.0 g/dL    HCT 25.6 (L) 35.0 - 47.0 %    MCV 95.5 80.0 - 99.0 FL    MCH 28.7 26.0 - 34.0 PG    MCHC 30.1 30.0 - 36.5 g/dL    RDW 14.9 (H) 11.5 - 14.5 %    PLATELET 55 (L) 985 - 400 K/uL    NRBC 0.0 0  WBC    ABSOLUTE NRBC 0.00 0.00 - 0.01 K/uL    NEUTROPHILS 66 32 - 75 %    LYMPHOCYTES 17 12 - 49 %    MONOCYTES 14 (H) 5 - 13 %    EOSINOPHILS 3 0 - 7 %    BASOPHILS 0 0 - 1 %    IMMATURE GRANULOCYTES 0 0.0 - 0.5 %    ABS. NEUTROPHILS 3.6 1.8 - 8.0 K/UL    ABS. LYMPHOCYTES 0.9 0.8 - 3.5 K/UL    ABS. MONOCYTES 0.8 0.0 - 1.0 K/UL    ABS. EOSINOPHILS 0.2 0.0 - 0.4 K/UL    ABS. BASOPHILS 0.0 0.0 - 0.1 K/UL    ABS. IMM. GRANS. 0.0 0.00 - 0.04 K/UL    DF AUTOMATED     METABOLIC PANEL, COMPREHENSIVE    Collection Time: 03/25/18  1:24 PM   Result Value Ref Range    Sodium 142 136 - 145 mmol/L    Potassium 4.0 3.5 - 5.1 mmol/L    Chloride 108 97 - 108 mmol/L    CO2 27 21 - 32 mmol/L    Anion gap 7 5 - 15 mmol/L    Glucose 95 65 - 100 mg/dL    BUN 17 6 - 20 MG/DL    Creatinine 0.96 0.55 - 1.02 MG/DL    BUN/Creatinine ratio 18 12 - 20      GFR est AA >60 >60 ml/min/1.73m2    GFR est non-AA 57 (L) >60 ml/min/1.73m2    Calcium 8.6 8.5 - 10.1 MG/DL    Bilirubin, total 2.2 (H) 0.2 - 1.0 MG/DL    ALT (SGPT) 32 12 - 78 U/L    AST (SGOT) 59 (H) 15 - 37 U/L    Alk.  phosphatase 150 (H) 45 - 117 U/L    Protein, total 6.9 6.4 - 8.2 g/dL    Albumin 2.4 (L) 3.5 - 5.0 g/dL    Globulin 4.5 (H) 2.0 - 4.0 g/dL    A-G Ratio 0.5 (L) 1.1 - 2.2     MAGNESIUM    Collection Time: 03/25/18  1:24 PM   Result Value Ref Range    Magnesium 1.9 1.6 - 2.4 mg/dL   NT-PRO BNP    Collection Time: 03/25/18  1:24 PM   Result Value Ref Range    NT pro- (H) 0 - 125 PG/ML   TROPONIN I    Collection Time: 03/25/18  1:24 PM   Result Value Ref Range    Troponin-I, Qt. <0.04 <0.05 ng/mL   LIPASE    Collection Time: 03/25/18  1:24 PM   Result Value Ref Range    Lipase 507 (H) 73 - 393 U/L   LACTIC ACID    Collection Time: 03/25/18  1:24 PM   Result Value Ref Range    Lactic acid 1.7 0.4 - 2.0 MMOL/L   OCCULT BLOOD, STOOL    Collection Time: 03/25/18  1:24 PM   Result Value Ref Range    Occult blood, stool NEGATIVE  NEG     TYPE & SCREEN    Collection Time: 03/25/18  1:24 PM   Result Value Ref Range    Crossmatch Expiration 03/28/2018     ABO/Rh(D) Zane Terrell NEGATIVE     Antibody screen NEG    EKG, 12 LEAD, INITIAL    Collection Time: 03/25/18  1:31 PM   Result Value Ref Range    Ventricular Rate 83 BPM    Atrial Rate 83 BPM    P-R Interval 170 ms    QRS Duration 72 ms    Q-T Interval 404 ms    QTC Calculation (Bezet) 474 ms    Calculated P Axis 52 degrees    Calculated R Axis 20 degrees    Calculated T Axis 34 degrees    Diagnosis       Normal sinus rhythm  Normal ECG  No previous ECGs available     URINALYSIS W/MICROSCOPIC    Collection Time: 03/25/18  1:58 PM   Result Value Ref Range    Color DARK YELLOW      Appearance CLOUDY (A) CLEAR      Specific gravity 1.018 1.003 - 1.030      pH (UA) 5.0 5.0 - 8.0      Protein NEGATIVE  NEG mg/dL    Glucose NEGATIVE  NEG mg/dL    Ketone NEGATIVE  NEG mg/dL    Bilirubin NEGATIVE  NEG      Blood NEGATIVE  NEG      Urobilinogen 0.2 0.2 - 1.0 EU/dL    Nitrites NEGATIVE  NEG      Leukocyte Esterase NEGATIVE  NEG      WBC 0-4 0 - 4 /hpf    RBC 0-5 0 - 5 /hpf    Epithelial cells FEW FEW /lpf    Bacteria NEGATIVE  NEG /hpf    Hyaline cast 2-5 0 - 5 /lpf URINE CULTURE HOLD SAMPLE    Collection Time: 03/25/18  1:58 PM   Result Value Ref Range    Urine culture hold        URINE ON HOLD IN MICROBIOLOGY DEPT FOR 3 DAYS. IF UNPRESERVED URINE IS SUBMITTED, IT CANNOT BE USED FOR ADDITIONAL TESTING AFTER 24 HRS, RECOLLECTION WILL BE REQUIRED. AMMONIA    Collection Time: 03/25/18  2:32 PM   Result Value Ref Range    Ammonia 25 <32 UMOL/L       IMAGING RESULTS:    Xr Chest Port    Result Date: 3/25/2018  INDICATION: Chest pain and shortness of breath FINDINGS: AP portable imaging of the chest performed at 2:10 PM demonstrates a normal cardiomediastinal silhouette. The lungs are clear bilaterally. No significant osseous abnormalities are seen. IMPRESSION: No evidence of acute cardiopulmonary process. MEDICATIONS GIVEN:  Medications - No data to display    IMPRESSION:  1. SOB (shortness of breath)    2. Anemia, unspecified type        PLAN:  1. Discharge Medication List as of 3/25/2018  5:50 PM      CONTINUE these medications which have NOT CHANGED    Details   !! furosemide (LASIX) 80 mg tablet Take 80 mg by mouth daily. , Historical Med      albuterol (PROVENTIL VENTOLIN) 2.5 mg /3 mL (0.083 %) nebulizer solution by Nebulization route every four (4) hours as needed for Wheezing., Historical Med      !! furosemide (LASIX) 20 mg tablet Take 20 mg by mouth two (2) times a day., Historical Med      omeprazole (PRILOSEC) 20 mg capsule Take 20 mg by mouth daily. , Historical Med      metoprolol (LOPRESSOR) 25 mg tablet Take 25 mg by mouth daily. , Historical Med       !! - Potential duplicate medications found. Please discuss with provider.         2.   Follow-up Information     Follow up With Details Comments Contact Info    Pramod Robin MD Schedule an appointment as soon as possible for a visit 1-2 days for recheck [id number] [facility]  [phone]          Return to ED if worse       5:51 PM  Pt has been reexamined. Pt has no new complaints, changes or physical findings. Care plan outlined and precautions discussed. All available results were reviewed with pt. All medications were reviewed with pt. All of pt's questions and concerns were addressed. Pt agrees to F/U as instructed and agrees to return to ED upon further deterioration. Pt is ready to go home.   BRISEYDA Tong

## 2018-03-25 NOTE — ED TRIAGE NOTES
Gradual shortness of breath over the last couple of weeks and some episodes of wheezing since Friday unrelieved with albuterol treatments. PMH CHF. Also has some lower abdominal pain and had an episode of vomiting this morning. PMH ROBBINS ascites.

## 2018-03-25 NOTE — DISCHARGE INSTRUCTIONS
Anemia: Care Instructions  Your Care Instructions    Anemia is a low level of red blood cells, which carry oxygen throughout your body. Many things can cause anemia. Lack of iron is one of the most common causes. Your body needs iron to make hemoglobin, a substance in red blood cells that carries oxygen from the lungs to your body's cells. Without enough iron, the body produces fewer and smaller red blood cells. As a result, your body's cells do not get enough oxygen, and you feel tired and weak. And you may have trouble concentrating. Bleeding is the most common cause of a lack of iron. You may have heavy menstrual bleeding or bleeding caused by conditions such as ulcers, hemorrhoids, or cancer. Regular use of aspirin or other anti-inflammatory medicines (such as ibuprofen) also can cause bleeding in some people. A lack of iron in your diet also can cause anemia, especially at times when the body needs more iron, such as during pregnancy, infancy, and the teen years. Your doctor may have prescribed iron pills. It may take several months of treatment for your iron levels to return to normal. Your doctor also may suggest that you eat foods that are rich in iron, such as meat and beans. There are many other causes of anemia. It is not always due to a lack of iron. Finding the specific cause of your anemia will help your doctor find the right treatment for you. Follow-up care is a key part of your treatment and safety. Be sure to make and go to all appointments, and call your doctor if you are having problems. It's also a good idea to know your test results and keep a list of the medicines you take. How can you care for yourself at home? · Take your medicines exactly as prescribed. Call your doctor if you think you are having a problem with your medicine. · If your doctor recommends iron pills, take them as directed:  ¨ Try to take the pills on an empty stomach about 1 hour before or 2 hours after meals. But you may need to take iron with food to avoid an upset stomach. ¨ Do not take antacids or drink milk or caffeine drinks (such as coffee, tea, or cola) at the same time or within 2 hours of the time that you take your iron. They can make it hard for your body to absorb the iron. ¨ Vitamin C (from food or supplements) helps your body absorb iron. Try taking iron pills with a glass of orange juice or some other food that is high in vitamin C, such as citrus fruits. ¨ Iron pills may cause stomach problems, such as heartburn, nausea, diarrhea, constipation, and cramps. Be sure to drink plenty of fluids, and include fruits, vegetables, and fiber in your diet each day. Iron pills often make your bowel movements dark or green. ¨ If you forget to take an iron pill, do not take a double dose of iron the next time you take a pill. ¨ Keep iron pills out of the reach of small children. An overdose of iron can be very dangerous. · Follow your doctor's advice about eating iron-rich foods. These include red meat, shellfish, poultry, eggs, beans, raisins, whole-grain bread, and leafy green vegetables. · Steam vegetables to help them keep their iron content. When should you call for help? Call 911 anytime you think you may need emergency care. For example, call if:  ? · You have symptoms of a heart attack. These may include:  ¨ Chest pain or pressure, or a strange feeling in the chest.  ¨ Sweating. ¨ Shortness of breath. ¨ Nausea or vomiting. ¨ Pain, pressure, or a strange feeling in the back, neck, jaw, or upper belly or in one or both shoulders or arms. ¨ Lightheadedness or sudden weakness. ¨ A fast or irregular heartbeat. After you call 911, the  may tell you to chew 1 adult-strength or 2 to 4 low-dose aspirin. Wait for an ambulance. Do not try to drive yourself. ? · You passed out (lost consciousness).    ?Call your doctor now or seek immediate medical care if:  ? · You have new or increased shortness of breath. ? · You are dizzy or lightheaded, or you feel like you may faint. ? · Your fatigue and weakness continue or get worse. ? · You have any abnormal bleeding, such as:  ¨ Nosebleeds. ¨ Vaginal bleeding that is different (heavier, more frequent, at a different time of the month) than what you are used to. ¨ Bloody or black stools, or rectal bleeding. ¨ Bloody or pink urine. ? Watch closely for changes in your health, and be sure to contact your doctor if:  ? · You do not get better as expected. Where can you learn more? Go to http://sonny-mark.info/. Enter R301 in the search box to learn more about \"Anemia: Care Instructions. \"  Current as of: October 13, 2016  Content Version: 11.4  © 7343-8657 [employer]. Care instructions adapted under license by [employer] (which disclaims liability or warranty for this information). If you have questions about a medical condition or this instruction, always ask your healthcare professional. [place] [address] any warranty or liability for your use of this information. Learning About Blood Transfusions  What is a blood transfusion? Blood transfusion is a medical treatment to replace the blood or parts of blood that your body has lost. The blood goes through a tube from a bag to an intravenous (IV) catheter and into your vein. You may need a blood transfusion after losing blood from an injury, a major surgery, an illness that causes bleeding, or an illness that destroys blood cells. Transfusions are also used to give you the parts of blood-such as platelets, plasma, or substances that cause clotting-that your body needs to fight an illness or stop bleeding. How is a blood transfusion done? Before you receive a blood transfusion, your blood is tested to find out what your blood type is. Blood or blood parts that are a match with your blood type are ordered by your doctor.  Blood is typed as A, B, AB, or O. It is also typed as Rh-positive or Rh-negative. Your blood is also screened to look for antibodies that might react with the blood that is given to you. The blood you are getting is checked and rechecked to make sure that it's the right type for you. A sample of your blood is mixed with a sample of the blood you will receive to check for problems. Before actually giving you the transfusion, a doctor and nurses will look at the label on the package of blood and compare it to your hospital ID bracelet and medical records. The transfusion begins only when all agree that this is the correct blood and that you are the correct person to receive it. To receive the transfusion, you will have an intravenous (IV) catheter inserted into a vein. A tube connects the catheter to the bag containing the blood, which is placed higher than your body. The blood then flows slowly into your vein. A doctor or nurse will check you several times during the transfusion to watch for a reaction or other problems. What are the possible risks? Blood transfusions have many benefits and are often life-saving. But they also have a few risks. Possible risks include:  · Your body's reaction to receiving new blood. This may include:  ¨ Fever. ¨ Allergic reactions. ¨ Breathing problems. · An infection from the blood. This risk is small because of the strict rules placed on handling and storing blood. Getting a viral infection, such as HIV or hepatitis B or C, through blood transfusions has become very rare. The U.S. Food and Drug Administration (FDA) enforces strict guidelines on the collection, testing, storage, and use of blood. · Getting the wrong blood type by accident. Severe reactions, which can be life-threatening, are very rare. What can you expect after a blood transfusion?   Here are some things you can do at home to help prevent infection at the transfusion site:  · Wash the area daily with warm, soapy water, and pat it dry. Don't use hydrogen peroxide or alcohol, which can slow healing. You may cover the area with a gauze bandage if it weeps or rubs against clothing. Change the bandage every day. · Keep the area clean and dry. When should you call for help? Call 911 anytime you think you may need emergency care. For example, call if:  · You have severe trouble breathing. Call your doctor now or seek immediate medical care if:  · You have a fever. · You feel weaker or more tired than usual.  · You have a yellow tint to your skin or the whites of your eyes. Watch closely for changes in your health, and be sure to contact your doctor if you have any problems. Follow-up care is a key part of your treatment and safety. Be sure to make and go to all appointments, and call your doctor if you are having problems. It's also a good idea to know your test results and keep a list of the medicines you take. Where can you learn more? Go to http://sonny-mark.info/. Enter V588 in the search box to learn more about \"Learning About Blood Transfusions. \"  Current as of: October 13, 2016  Content Version: 11.4  © 7704-0289 [employer]. Care instructions adapted under license by [employer] (which disclaims liability or warranty for this information). If you have questions about a medical condition or this instruction, always ask your healthcare professional. [place] [address] any warranty or liability for your use of this information. Shortness of Breath: Care Instructions  Your Care Instructions  Shortness of breath has many causes. Sometimes conditions such as anxiety can lead to shortness of breath. Some people get mild shortness of breath when they exercise. Trouble breathing also can be a symptom of a serious problem, such as asthma, lung disease, emphysema, heart problems, and pneumonia.   If your shortness of breath continues, you may need tests and treatment. Watch for any changes in your breathing and other symptoms. Follow-up care is a key part of your treatment and safety. Be sure to make and go to all appointments, and call your doctor if you are having problems. It's also a good idea to know your test results and keep a list of the medicines you take. How can you care for yourself at home? · Do not smoke or allow others to smoke around you. If you need help quitting, talk to your doctor about stop-smoking programs and medicines. These can increase your chances of quitting for good. · Get plenty of rest and sleep. · Take your medicines exactly as prescribed. Call your doctor if you think you are having a problem with your medicine. · Find healthy ways to deal with stress. ¨ Exercise daily. ¨ Get plenty of sleep. ¨ Eat regularly and well. When should you call for help? Call 911 anytime you think you may need emergency care. For example, call if:  ? · You have severe shortness of breath. ? · You have symptoms of a heart attack. These may include:  ¨ Chest pain or pressure, or a strange feeling in the chest.  ¨ Sweating. ¨ Shortness of breath. ¨ Nausea or vomiting. ¨ Pain, pressure, or a strange feeling in the back, neck, jaw, or upper belly or in one or both shoulders or arms. ¨ Lightheadedness or sudden weakness. ¨ A fast or irregular heartbeat. After you call 911, the  may tell you to chew 1 adult-strength or 2 to 4 low-dose aspirin. Wait for an ambulance. Do not try to drive yourself. ?Call your doctor now or seek immediate medical care if:  ? · Your shortness of breath gets worse or you start to wheeze. Wheezing is a high-pitched sound when you breathe. ? · You wake up at night out of breath or have to prop your head up on several pillows to breathe. ? · You are short of breath after only light activity or while at rest.   ? Watch closely for changes in your health, and be sure to contact your doctor if:  ? · You do not get better over the next 1 to 2 days. Where can you learn more? Go to http://sonny-mark.info/. Enter S780 in the search box to learn more about \"Shortness of Breath: Care Instructions. \"  Current as of: May 12, 2017  Content Version: 11.4  © 9885-9482 [employer]. Care instructions adapted under license by [employer] (which disclaims liability or warranty for this information). If you have questions about a medical condition or this instruction, always ask your healthcare professional. [address] any warranty or liability for your use of this information. We hope that we have addressed all of your medical concerns. The examination and treatment you received in the Emergency Department were for an emergent problem and were not intended as complete care. It is important that you follow up with your healthcare provider(s) for ongoing care. If your symptoms worsen or do not improve as expected, and you are unable to reach your usual health care provider(s), you should return to the Emergency Department. Today's healthcare is undergoing tremendous change, and patient satisfaction surveys are one of the many tools to assess the quality of medical care. You may receive a survey from the [employer] organization regarding your experience in the Emergency Department. I hope that your experience has been completely positive, particularly the medical care that I provided. As such, please participate in the survey; anything less than excellent does not meet my expectations or intentions. [address] and [address] [place] [address] participate in nationally recognized quality of care measures. If your blood pressure is greater than 120/80, as reported below, we urge that you seek medical care to address the potential of high blood pressure, commonly known as hypertension.    Hypertension can be hereditary or can be caused by certain medical conditions, pain, stress, or \"white coat syndrome. \"       Please make an appointment with your health care provider(s) for follow up of your Emergency Department visit. VITALS:   Patient Vitals for the past 8 hrs:   Temp Pulse Resp BP SpO2   03/25/18 1645 - 77 20 123/47 93 %   03/25/18 1630 - 80 22 113/48 94 %   03/25/18 1615 - 80 24 119/45 95 %   03/25/18 1611 98.4 °F (36.9 °C) - - - -   03/25/18 1600 - 81 23 116/42 96 %   03/25/18 1545 - 81 23 128/47 97 %   03/25/18 1530 - 80 22 115/50 97 %   03/25/18 1500 - 77 20 116/49 94 %   03/25/18 1445 - 78 21 118/52 97 %   03/25/18 1415 - 84 14 128/49 98 %   03/25/18 1406 99.5 °F (37.5 °C) - - - -   03/25/18 1345 - 80 23 132/61 97 %   03/25/18 1343 99 °F (37.2 °C) - - - -   03/25/18 1316 - - - - 97 %   03/25/18 1315 - 85 23 128/54 98 %   03/25/18 1258 99.5 °F (37.5 °C) 80 21 142/59 93 %          Thank you for allowing us to provide you with medical care today. We realize that you have many choices for your emergency care needs. Please choose us in the future for any continued health care needs. Elissa Fitzgerald, [address] [place] [address] [address]   Office: [phone]            Recent Results (from the past 24 hour(s))   CBC WITH AUTOMATED DIFF    Collection Time: 03/25/18  1:24 PM   Result Value Ref Range    WBC 5.5 3.6 - 11.0 K/uL    RBC 2.68 (L) 3.80 - 5.20 M/uL    HGB 7.7 (L) 11.5 - 16.0 g/dL    HCT 25.6 (L) 35.0 - 47.0 %    MCV 95.5 80.0 - 99.0 FL    MCH 28.7 26.0 - 34.0 PG    MCHC 30.1 30.0 - 36.5 g/dL    RDW 14.9 (H) 11.5 - 14.5 %    PLATELET 55 (L) 045 - 400 K/uL    NRBC 0.0 0  WBC    ABSOLUTE NRBC 0.00 0.00 - 0.01 K/uL    NEUTROPHILS 66 32 - 75 %    LYMPHOCYTES 17 12 - 49 %    MONOCYTES 14 (H) 5 - 13 %    EOSINOPHILS 3 0 - 7 %    BASOPHILS 0 0 - 1 %    IMMATURE GRANULOCYTES 0 0.0 - 0.5 %    ABS. NEUTROPHILS 3.6 1.8 - 8.0 K/UL    ABS. LYMPHOCYTES 0.9 0.8 - 3.5 K/UL    ABS.  MONOCYTES 0.8 0.0 - 1.0 K/UL    ABS. EOSINOPHILS 0.2 0.0 - 0.4 K/UL    ABS. BASOPHILS 0.0 0.0 - 0.1 K/UL    ABS. IMM. GRANS. 0.0 0.00 - 0.04 K/UL    DF AUTOMATED     METABOLIC PANEL, COMPREHENSIVE    Collection Time: 03/25/18  1:24 PM   Result Value Ref Range    Sodium 142 136 - 145 mmol/L    Potassium 4.0 3.5 - 5.1 mmol/L    Chloride 108 97 - 108 mmol/L    CO2 27 21 - 32 mmol/L    Anion gap 7 5 - 15 mmol/L    Glucose 95 65 - 100 mg/dL    BUN 17 6 - 20 MG/DL    Creatinine 0.96 0.55 - 1.02 MG/DL    BUN/Creatinine ratio 18 12 - 20      GFR est AA >60 >60 ml/min/1.73m2    GFR est non-AA 57 (L) >60 ml/min/1.73m2    Calcium 8.6 8.5 - 10.1 MG/DL    Bilirubin, total 2.2 (H) 0.2 - 1.0 MG/DL    ALT (SGPT) 32 12 - 78 U/L    AST (SGOT) 59 (H) 15 - 37 U/L    Alk.  phosphatase 150 (H) 45 - 117 U/L    Protein, total 6.9 6.4 - 8.2 g/dL    Albumin 2.4 (L) 3.5 - 5.0 g/dL    Globulin 4.5 (H) 2.0 - 4.0 g/dL    A-G Ratio 0.5 (L) 1.1 - 2.2     MAGNESIUM    Collection Time: 03/25/18  1:24 PM   Result Value Ref Range    Magnesium 1.9 1.6 - 2.4 mg/dL   NT-PRO BNP    Collection Time: 03/25/18  1:24 PM   Result Value Ref Range    NT pro- (H) 0 - 125 PG/ML   TROPONIN I    Collection Time: 03/25/18  1:24 PM   Result Value Ref Range    Troponin-I, Qt. <0.04 <0.05 ng/mL   LIPASE    Collection Time: 03/25/18  1:24 PM   Result Value Ref Range    Lipase 507 (H) 73 - 393 U/L   LACTIC ACID    Collection Time: 03/25/18  1:24 PM   Result Value Ref Range    Lactic acid 1.7 0.4 - 2.0 MMOL/L   OCCULT BLOOD, STOOL    Collection Time: 03/25/18  1:24 PM   Result Value Ref Range    Occult blood, stool NEGATIVE  NEG     EKG, 12 LEAD, INITIAL    Collection Time: 03/25/18  1:31 PM   Result Value Ref Range    Ventricular Rate 83 BPM    Atrial Rate 83 BPM    P-R Interval 170 ms    QRS Duration 72 ms    Q-T Interval 404 ms    QTC Calculation (Bezet) 474 ms    Calculated P Axis 52 degrees    Calculated R Axis 20 degrees    Calculated T Axis 34 degrees    Diagnosis       Normal sinus rhythm  Normal ECG  No previous ECGs available     URINALYSIS W/MICROSCOPIC    Collection Time: 03/25/18  1:58 PM   Result Value Ref Range    Color DARK YELLOW      Appearance CLOUDY (A) CLEAR      Specific gravity 1.018 1.003 - 1.030      pH (UA) 5.0 5.0 - 8.0      Protein NEGATIVE  NEG mg/dL    Glucose NEGATIVE  NEG mg/dL    Ketone NEGATIVE  NEG mg/dL    Bilirubin NEGATIVE  NEG      Blood NEGATIVE  NEG      Urobilinogen 0.2 0.2 - 1.0 EU/dL    Nitrites NEGATIVE  NEG      Leukocyte Esterase NEGATIVE  NEG      WBC 0-4 0 - 4 /hpf    RBC 0-5 0 - 5 /hpf    Epithelial cells FEW FEW /lpf    Bacteria NEGATIVE  NEG /hpf    Hyaline cast 2-5 0 - 5 /lpf   URINE CULTURE HOLD SAMPLE    Collection Time: 03/25/18  1:58 PM   Result Value Ref Range    Urine culture hold        URINE ON HOLD IN MICROBIOLOGY DEPT FOR 3 DAYS. IF UNPRESERVED URINE IS SUBMITTED, IT CANNOT BE USED FOR ADDITIONAL TESTING AFTER 24 HRS, RECOLLECTION WILL BE REQUIRED. AMMONIA    Collection Time: 03/25/18  2:32 PM   Result Value Ref Range    Ammonia 25 <32 UMOL/L       Xr Chest Port    Result Date: 3/25/2018  INDICATION: Chest pain and shortness of breath FINDINGS: AP portable imaging of the chest performed at 2:10 PM demonstrates a normal cardiomediastinal silhouette. The lungs are clear bilaterally. No significant osseous abnormalities are seen. IMPRESSION: No evidence of acute cardiopulmonary process.

## 2018-03-26 PROBLEM — D61.818 PANCYTOPENIA (HCC): Status: ACTIVE | Noted: 2018-01-01

## 2018-03-26 PROBLEM — D64.9 ANEMIA: Status: ACTIVE | Noted: 2018-01-01

## 2018-03-26 PROBLEM — R06.09 DYSPNEA ON EFFORT: Status: ACTIVE | Noted: 2018-01-01

## 2018-03-26 PROBLEM — D72.819 LEUKOPENIA: Status: ACTIVE | Noted: 2018-01-01

## 2018-03-26 NOTE — H&P
[facility]    Hospitalist Admission Note      NAME:  Selvin Nick   :   1950   MRN:  [id number]     PCP:  BRISEYDA Torres     Date/Time:  3/26/2018 4:35 PM          Subjective:     CHIEF COMPLAINT: anemia     HISTORY OF PRESENT ILLNESS:     Ms. Nakul Delgado is a 77 y.o.  female who presented to the Emergency Department complaining of anemia. She was sent here by her hematologist.  Shane Bolds show pancytopenia, moderate retic, developing over weeks. Patient reports GOLDMAN. Hemoccult is negative. We will admit her for observation. Past Medical History:   Diagnosis Date    Anemia     GERD (gastroesophageal reflux disease)     H/O bone density study 10/28/2016    Osteopenia    Hypermenorrhea     Hypertension     Leiomyoma of uterus, unspecified     Liver cirrhosis secondary to ROBBINS ([facility]     Menopausal syndrome     Osteopenia 2016    Thrombocytopenia ([facility]     Transitional cell bladder cancer ([facility]     Has been cleared by urology        Past Surgical History:   Procedure Laterality Date    HX HYSTEROSCOPY  1996    w/ D&C    HX OTHER SURGICAL  02/19/10    tumor in bladder removed x2 in     HX OTHER SURGICAL  03/25/10    Liver Biopsy--Cirrhosis    HX TUBAL LIGATION      HX UROLOGICAL      tumor removed from bladder       Social History   Substance Use Topics    Smoking status: Never Smoker    Smokeless tobacco: Never Used    Alcohol use No        Family History   Problem Relation Age of Onset    Diabetes Brother      Type II    Hypertension Brother     Heart Disease Father     Heart Attack Father 64    Hypertension Father     Hypertension Mother     Osteoporosis Mother      +Hip fracture    Hypertension Sister       Family hx cannot be fully assessed, due to the admitting conditions    No Known Allergies     Prior to Admission medications    Medication Sig Start Date End Date Taking?  Authorizing Provider   furosemide (LASIX) 80 mg tablet Take 80 mg by mouth daily. Yes Ernesto Pagan MD   furosemide (LASIX) 20 mg tablet Take 20 mg by mouth two (2) times a day. Yes Historical Provider   omeprazole (PRILOSEC) 20 mg capsule Take 20 mg by mouth daily. 11/25/17  Yes Historical Provider   metoprolol (LOPRESSOR) 25 mg tablet Take 25 mg by mouth daily. 8/16/14  Yes Historical Provider   albuterol (PROVENTIL VENTOLIN) 2.5 mg /3 mL (0.083 %) nebulizer solution by Nebulization route every four (4) hours as needed for Wheezing.     Ernesto Pagan MD       Review of Systems:  (bold if positive, if negative)    Gen:  fatigueEyes:  ENT:  CVS:  Pulm:  dyspneaGI:    :    MS:  Skin:  Psych:  Endo:    Hem:  Renal:    Neuro:        Objective:      VITALS:    Vital signs reviewed; most recent are:    Visit Vitals    /70 (BP 1 Location: Right arm, BP Patient Position: At rest)    Pulse 74    Temp 98.7 °F (37.1 °C)    Resp 16    Ht 5' 4\" (1.626 m)    Wt 125 kg (275 lb 9.2 oz)    SpO2 92%    BMI 47.3 kg/m2     SpO2 Readings from Last 6 Encounters:   03/26/18 92%   03/25/18 96%   01/26/18 99%   12/20/17 95%        No intake or output data in the 24 hours ending 03/26/18 1635     Exam:     Physical Exam:    Gen:  Morbid obese, in no acute distress  HEENT:  Pale conjunctivae, PERRL, hearing intact to voice, moist mucous membranes  Neck:  Supple, without masses, thyroid non-tender  Resp:  No accessory muscle use, clear breath sounds without wheezes rales or rhonchi  Card:  No murmurs, normal S1, S2 without thrills, bruits or peripheral edema  Abd:  Soft, non-tender, non-distended, normoactive bowel sounds are present, no mass  Lymph:  No cervical or inguinal adenopathy  Musc:  No cyanosis or clubbing  Skin:  No rashes or ulcers, skin turgor is good  Neuro:  Cranial nerves are grossly intact, general motor weakness, follows commands appropriately  Psych:  Good insight, oriented to person, place and time, alert     Labs:    Recent Labs      03/26/18   1340   WBC  3.3*   HGB  7.5*   HCT 24.8*   PLT  67*     Recent Labs      03/26/18   1340  03/25/18   1324   NA  140  142   K  4.3  4.0   CL  107  108   CO2  26  27   GLU  96  95   BUN  18  17   CREA  1.00  0.96   CA  8.5  8.6   MG   --   1.9   ALB  2.3*  2.4*   TBILI  2.8*  2.2*   SGOT  60*  59*   ALT  33  32     No results found for: GLUCPOC  No results for input(s): PH, PCO2, PO2, HCO3, FIO2 in the last 72 hours. No results for input(s): INR in the last 72 hours. No lab exists for component: INREXT  All Micro Results     None          I have reviewed previous records       Assessment and Plan:      Pancytopenia / Anemia / Thrombocytopenia / Leukopenia - POA, given moderate retic count and hemoccult negative status, I do not think GI bleed is cause, but at request of hematology, I spoke to GI. They also agree this is unlikely a GI issue, and request that the consult be held off until hematology weighs in and calls GI directly if they have a persistent concern fo GI bleed. Perhaps instead pancytopenia due to bone marrow failure. She is recently on higher dose lasix, and Lasix causes aplastic anemia in rare cases. Consult hematology inpatient. Anticipate bone marrow bx in AM, NPO at midnight. ER ordered pRBC transfusion, although patient has Hg level above our cut off of 7. Dyspnea on effort - Likely due to morbid obesity and anemia.  Check PT/OT eval.    Morbid obese - Advise weight loss and needs outpatient UMAIR testing and treatment       Telemetry reviewed:   normal sinus rhythm    Risk of deterioration: medium      Total time spent with patient: [phone] [place] discussed with: Patient, Family, Nursing Staff, Consultant/Specialist and >50% of time spent in counseling and coordination of care    Discussed:  Care Plan       ___________________________________________________    Attending Physician: Carey Cid MD

## 2018-03-26 NOTE — ED PROVIDER NOTES
HPI Comments: Pt is a 66yro F with Pmhx of ROBBINS, CHF, ascites, esophageal varices and thrombocytopenia who presents to ED complaining of continuous SOB on exertion and fatigue. Patient was seen in ED yesterday for same Sx, onset: 4 days ago. She was found to have Hg of 7.5, a decreased from Hg of 10 on labwork 2 weeks. Patient noted to have external hemorrhoids, FOBT done was negative yesterday. Dr. Keri Fry, Hem/onc, was consulted and agreed for blood transfusion OP. However, after seeing patient today, there was concern for GIB as patient continues to be symptomatic and patient sent to ED for possible admission and further workup. Patient continues to endorse unusual fatigue, but SOB experienced only with ambulation now. She denies hematuria or hematochezia, H/A, dizziness, fever/chills, chest pain, palpitations, N/V, diarrhea or dysuria.             Past Medical History:   Diagnosis Date    Anemia     Cirrhosis ([address]     ROBBINS     GERD (gastroesophageal reflux disease)     H/O bone density study 10/28/2016    Osteopenia    Hx of bone density study 08/09/06    Normal x2    Hypermenorrhea     Hypertension     Leiomyoma of uterus, unspecified     Menopausal syndrome     Osteopenia 2016    Thrombocytopenia ([address]     Thromboembolus ([address]     Transitional cell bladder cancer ([address]     Has been cleared by urology       Past Surgical History:   Procedure Laterality Date    HX HYSTEROSCOPY  03/1996    w/ D&C    HX OTHER SURGICAL  02/19/10    tumor in bladder removed x2 in 2010    HX OTHER SURGICAL  03/25/10    Liver Biopsy--Cirrhosis    HX TUBAL LIGATION      HX UROLOGICAL      tumor removed from bladder         Family History:   Problem Relation Age of Onset    Diabetes Brother      Type II    Hypertension Brother     Heart Disease Father     Heart Attack Father 64    Hypertension Father     Hypertension Mother     Osteoporosis Mother      +Hip fracture    Hypertension Sister        Social History     Social History    Marital status:      Spouse name: N/A    Number of children: N/A    Years of education: N/A     Occupational History    Not on file. Social History Main Topics    Smoking status: Never Smoker    Smokeless tobacco: Never Used    Alcohol use No    Drug use: No    Sexual activity: Not Currently     Partners: Male     Birth control/ protection: None     Other Topics Concern    Not on file     Social History Narrative         ALLERGIES: Review of patient's allergies indicates no known allergies. Review of Systems   Constitutional: Positive for fatigue. Negative for chills and fever. HENT: Negative for congestion. Eyes: Negative for visual disturbance. Respiratory: Positive for shortness of breath (with exertion). Negative for cough. Cardiovascular: Negative for chest pain and palpitations. Gastrointestinal: Negative for abdominal distention, blood in stool and diarrhea. Endocrine: Negative for polyuria. Genitourinary: Negative for dysuria. Musculoskeletal: Positive for back pain (chronic). Negative for myalgias. Skin: Negative for wound. Neurological: Negative for dizziness, syncope and headaches. Psychiatric/Behavioral: Negative for agitation and confusion. Vitals:    03/26/18 1249   BP: 133/70   Pulse: 74   Resp: 16   Temp: 98.7 °F (37.1 °C)   SpO2: 92%   Weight: 125 kg (275 lb 9.2 oz)   Height: 5' 4\" (1.626 m)            Physical Exam   Constitutional: She is oriented to person, place, and time. Obese, in NAD, does not appear in pain, cooperative. HENT:   Head: Normocephalic and atraumatic. Eyes: Conjunctivae and EOM are normal. Pupils are equal, round, and reactive to light. Neck: Normal range of motion. Cardiovascular: Normal rate and regular rhythm. No murmur heard. Pulmonary/Chest: Effort normal and breath sounds normal. No respiratory distress. She has no wheezes. She has no rales. Abdominal: Soft.  She exhibits distension. There is no tenderness. Genitourinary:   Genitourinary Comments: 3 external hemorrhoids noted   Musculoskeletal: Normal range of motion. She exhibits edema (3+ pitting edema). She exhibits no tenderness. Neurological: She is alert and oriented to person, place, and time. Skin: Skin is warm. No rash noted. Psychiatric: She has a normal mood and affect. Her behavior is normal. Thought content normal.        MDM  Number of Diagnoses or Management Options  Diagnosis management comments: Pt is a 66yro F who was sent to ED by hematologist for blood transfusion and hiospital admission due to decreased Hg    Likely GIB  -CBC, CMP, LD, iron profile, ferritin, retic count, haptoglobin, and LD(orders per Dr. Fay Ye, hem/onc)  -FOBT negative yesterday, will repeat today for re-assurance.   -Consulted hospitalist for admission and further management of patient's condition, will admit.          ED Course       Procedures  None

## 2018-03-26 NOTE — PROGRESS NOTES
Bedside and Verbal shift change report given to Adi RN (oncoming nurse) by Chevy Herrera RN (offgoing nurse). Report included the following information SBAR, Kardex, ED Summary, Procedure Summary, Intake/Output, MAR, Accordion and Recent Results.

## 2018-03-26 NOTE — ED NOTES
TRANSFER - OUT REPORT:    Verbal report given to Vanessa(name) on Swati Tillman  being transferred to [facility](unit) for routine progression of care       Report consisted of patients Situation, Background, Assessment and   Recommendations(SBAR). Information from the following report(s) SBAR, Kardex and ED Summary was reviewed with the receiving nurse. Lines:   Peripheral IV 03/26/18 Right Hand (Active)   Site Assessment Clean, dry, & intact 3/26/2018  1:56 PM   Phlebitis Assessment 0 3/26/2018  1:56 PM   Infiltration Assessment 0 3/26/2018  1:56 PM   Dressing Status Clean, dry, & intact 3/26/2018  1:56 PM        Opportunity for questions and clarification was provided.       Patient transported with:   [employer]

## 2018-03-26 NOTE — IP AVS SNAPSHOT
Estuardojustin Lobito Ponce [address] 1007 [facility] 
[phone] Patient: Lalo Schmidt MRN: [id number] [facility]:7/79/2096 About your hospitalization You were admitted on:  March 26, 2018 You last received care in the:  [facility]  MED SURG 2 You were discharged on:  March 28, 2018 Why you were hospitalized Your primary diagnosis was: Anemia Your diagnoses also included:  Dyspnea On Effort, Pancytopenia (Hcc), Thrombocytopenia (Hcc), Leukopenia Follow-up Information Follow up With Details Comments Contact Info Kianna Pereira MD Go on 4/12/2018 appt at 2 pm  [address] [facility] Ag. [address] [place] [address] 
[phone] Lindsey Blair   [address] 
[phone] Your Scheduled Appointments Thursday April 12, 2018  2:00 PM EDT  
ESTABLISHED PATIENT with Kianna Pereira MD  
[facility] 365[facility]) 37[address] [facility], [address]  
[phone] Friday May 04, 2018 10:30 AM EDT New Patient with Sonia Tapia MD  
[facility] ([address]) [address] [place] [address] [phone] [address] [place] [address]  
[phone] Discharge Orders None A check joya indicates which time of day the medication should be taken. My Medications START taking these medications Instructions Each Dose to Equal  
 Morning Noon Evening Bedtime  
 spironolactone 25 mg tablet Commonly known as:  ALDACTONE Your last dose was: Your next dose is: Take 1 Tab by mouth daily. 25 mg CONTINUE taking these medications Instructions Each Dose to Equal  
 Morning Noon Evening Bedtime  
 albuterol 2.5 mg /3 mL (0.083 %) nebulizer solution Commonly known as:  PROVENTIL VENTOLIN Your last dose was: Your next dose is: 2.5 mg by Nebulization route every four (4) hours as needed for Wheezing. 2.5 mg  
    
   
   
   
  
 LASIX 80 mg tablet Generic drug:  furosemide Your last dose was: Your next dose is: Take 40 mg by mouth two (2) times a day. 40 mg  
    
   
   
   
  
 omeprazole 20 mg capsule Commonly known as:  PRILOSEC Your last dose was: Your next dose is: Take 20 mg by mouth daily. 20 mg  
    
   
   
   
  
 ZOFRAN ODT 4 mg disintegrating tablet Generic drug:  ondansetron Your last dose was: Your next dose is: Take 4 mg by mouth every eight (8) hours as needed for Nausea. 4 mg STOP taking these medications   
 metoprolol tartrate 25 mg tablet Commonly known as:  LOPRESSOR  
   
  
 naproxen sodium 220 mg tablet Commonly known as:  NAPROSYN Where to Get Your Medications Information on where to get these meds will be given to you by the nurse or doctor. ! Ask your nurse or doctor about these medications  
  spironolactone 25 mg tablet Discharge Instructions HOSPITALIST DISCHARGE INSTRUCTIONS 
NAME: Shaw Lira :  1950 MRN:  [id number] Date/Time:  3/28/2018 1:29 PM 
 
ADMIT DATE: 3/26/2018 DISCHARGE DATE: 3/28/2018 ADMITTING DIAGNOSIS: 
Pancytopenia DISCHARGE DIAGNOSIS: 
As above MEDICATIONS: 
  
· It is important that you take the medication exactly as they are prescribed. · Keep your medication in the bottles provided by the pharmacist and keep a list of the medication names, dosages, and times to be taken in your wallet. · Do not take other medications without consulting your doctor. Pain Management: per above medications What to do at [facility] Recommended diet:  Regular Diet Recommended activity: Activity as tolerated If you experience any of the following symptoms then please call your primary care physician or return to the emergency room if you cannot get hold of your doctor: 
Fever, chills, nausea, vomiting, diarrhea, change in mentation, falling, bleeding, shortness of breath, chest pain Follow Up: 
BRISEYDA Shepard  you are to call and set up an appointment to see them in 2 weeks. Information obtained by : 
I understand that if any problems occur once I am at home I am to contact my physician. I understand and acknowledge receipt of the instructions indicated above. Physician's or R.N.'s Signature                                                                  Date/Time Patient or Representative Signature                                                          Date/Time Bone Marrow Aspiration and Biopsy: What to Expect at [facility] Your Recovery The biopsy site may feel sore for several days. It can help to walk, take pain medicine, and put ice packs on the site. You will probably be able to return to work and your usual activities the day after the procedure. Your doctor or nurse will call you with the results of your test. 
This care sheet gives you a general idea about how long it will take for you to recover. But each person recovers at a different pace. Follow the steps below to get better as quickly as possible. How can you care for yourself at home? Activity ? · Rest when you feel tired. Getting enough sleep will help you recover. ? · You may drive when you are no longer taking pain pills and can quickly move your foot from the gas pedal to the brake. You must also be able to sit comfortably for a long period of time, even if you do not plan to go far. You might get caught in traffic. ? · Most people are able to return to work the day after the procedure. Medicines ? · Your doctor will tell you if and when you can restart your medicines. He or she will also give you instructions about taking any new medicines. ? · If you take blood thinners, such as warfarin (Coumadin), clopidogrel (Plavix), or aspirin, be sure to talk to your doctor. He or she will tell you if and when to start taking those medicines again. Make sure that you understand exactly what your doctor wants you to do. ? · Be safe with medicines. Take pain medicines exactly as directed. ¨ If the doctor gave you a prescription medicine for pain, take it as prescribed. ¨ If you are not taking a prescription pain medicine, take an over-the-counter medicine such as acetaminophen (Tylenol), ibuprofen (Advil, Motrin), or naproxen (Aleve). Read and follow all instructions on the label. ¨ Do not take two or more pain medicines at the same time unless the doctor told you to. Many pain medicines have acetaminophen, which is Tylenol. Too much acetaminophen (Tylenol) can be harmful. ? · If you think your pain medicine is making you sick to your stomach: 
¨ Take your medicine after meals (unless your doctor has told you not to). ¨ Ask your doctor for a different pain medicine. ? · If your doctor prescribed antibiotics, take them as directed. Do not stop taking them just because you feel better. ?Ice ? · Put ice or a cold pack on the biopsy site for 10 to 20 minutes at a time. Put a thin cloth between the ice and your skin. Follow-up care is a key part of your treatment and safety. Be sure to make and go to all appointments, and call your doctor if you are having problems. It's also a good idea to know your test results and keep a list of the medicines you take. When should you call for help? Call 911 anytime you think you may need emergency care. For example, call if: 
? · You passed out (lost consciousness). ?Call your doctor now or seek immediate medical care if: 
? · You have signs of infection, such as: 
¨ Increased pain, swelling, warmth, or redness. ¨ Red streaks leading from the biopsy site. ¨ Pus draining from the biopsy site. ¨ Swollen lymph nodes in your neck, armpits, or groin. ¨ A fever. ? Watch closely for any changes in your health, and be sure to contact your doctor if: 
? · You are not getting better as expected. Where can you learn more? Go to http://sonny-mark.info/. Enter E148 in the search box to learn more about \"Bone Marrow Aspiration and Biopsy: What to Expect at Home. \" Current as of: October 14, 2016 Content Version: 11.4 © 7720-4253 [employer]. Care instructions adapted under license by [employer] (which disclaims liability or warranty for this information). If you have questions about a medical condition or this instruction, always ask your healthcare professional. [place] [address] any warranty or liability for your use of this information. Introducing [facility]! Dear Olga Melvin: Thank you for requesting a Bocom account. Our records indicate that you already have an active Bocom account. You can access your account anytime at [email or web address] Software Did you know that you can access your hospital and ER discharge instructions at any time in Bocom? You can also review all of your test results from your hospital stay or ER visit. Additional Information If you have questions, please visit the Frequently Asked Questions section of the Bocom website at [email or web address] Remember, Bocom is NOT to be used for urgent needs. For medical emergencies, dial 911. Now available from your iPhone and Android! Introducing Jeb Jerez As a Yuki Loera patient, I wanted to make you aware of our electronic visit tool called Jeb Jerez. [employer] 24/7 allows you to connect within minutes with a medical provider 24 hours a day, seven days a week via a mobile device or tablet or logging into a secure website from your computer. You can access Populis from anywhere in the [place]. A virtual visit might be right for you when you have a simple condition and feel like you just dont want to get out of bed, or cant get away from work for an appointment, when your regular [facility] provider is not available (evenings, weekends or holidays), or when youre out of town and need minor care. Electronic visits cost only $49 and if the [employer] 24/7 provider determines a prescription is needed to treat your condition, one can be electronically transmitted to a nearby pharmacy*. Please take a moment to enroll today if you have not already done so. The enrollment process is free and takes just a few minutes. To enroll, please download the [facility]/7 beka to your tablet or phone, or visit [email or web address] to enroll on your computer. And, as an [address] [place] [address] patient with a Soma account, the results of your visits will be scanned into your electronic medical record and your primary care provider will be able to view the scanned results. We urge you to continue to see your regular [facility] provider for your ongoing medical care. And while your primary care provider may not be the one available when you seek a reQwiptikifin virtual visit, the peace of mind you get from getting a real diagnosis real time can be priceless. For more information on reQwiptikifin, view our Frequently Asked Questions (FAQs) at [email or web address]. Sincerely, 
 
Nayeli Flowers MD 
Chief Medical Officer Janette Maciel *:  certain medications cannot be prescribed via Populis Providers Seen During Your Hospitalization Provider Specialty Primary office phone Rachael Gaston MD Emergency Medicine [phone] Katelyn Larsen MD Internal Medicine [phone] Tapan Britt MD Internal Medicine [phone] Your Primary Care Physician (PCP) Primary Care Physician Office Phone Office Fax Sue Beck [phone] You are allergic to the following No active allergies Recent Documentation Height Weight BMI OB Status Smoking Status 1.626 m 125 kg 47.3 kg/m2 Postmenopausal Never Smoker Emergency Contacts Name Discharge Info Relation Home Work Mobile Alejo Mckeon DISCHARGE CAREGIVER [3] Spouse [3] [phone] Alejo Mckeon  Spouse [3] [phone] Patient Belongings The following personal items are in your possession at time of discharge: 
  Dental Appliances: None  Visual Aid: Glasses, With patient      Home Medications: None   Jewelry: Ring (3)  Clothing: Footwear, Shirt, Pants    Other Valuables: Jeana Link Please provide this summary of care documentation to your next provider. Signatures-by signing, you are acknowledging that this After Visit Summary has been reviewed with you and you have received a copy. Patient Signature:  ____________________________________________________________ Date:  ____________________________________________________________  
  
[facility] Provider Signature:  ____________________________________________________________ Date:  ____________________________________________________________

## 2018-03-26 NOTE — ED NOTES
Pt sitting on side of bed when RN entered room, pt appears tired. Pt not wanting to answer questions asked by RN, daughter providing information at this time. Pt now standing at bedside, declining to lay back in stretcher.

## 2018-03-26 NOTE — PROGRESS NOTES
TRANSFER - IN REPORT:    Verbal report received from Vera AGUERO (name) on Tamy Castillo  being received from ED (unit) for routine progression of care      Report consisted of patients Situation, Background, Assessment and   Recommendations(SBAR). Information from the following report(s) SBAR, Kardex, ED Summary, Intake/Output, MAR, Accordion and Recent Results was reviewed with the receiving nurse. Opportunity for questions and clarification was provided. Assessment completed upon patients arrival to unit and care assumed.          Primary Nurse Carmel Otero RN and Adi RN performed a dual skin assessment on this patient No impairment noted  Edmond score is 21

## 2018-03-26 NOTE — CONSULTS
[facility] [place] at [address], 2329 [facility] 1007 [facility]  Jun Resides: [phone]  F: [phone]      Reason for Visit:   Juanita Santiago is a 77 y.o. female who is seen in consultation for continuity of care, new symptomatic anemia. History of Present Illness:   Ms. Kiersten Rader is a 78 y/o female with ROBBINS cirrhosis who p/w SOB, GOLDMAN, fatigue as well as lower abdominal pain admitted with a new symptomatic anemia. She was seen in the ED yesterday and found to have Hgb 7.7, stool hemoccult negative. She was sent home yesterday with plan for close f/u in [address] [place] [address]. However, patient's primary care PA called stating that patient's Hgb was > 10 only weeks ago, and that patient's GOLDMAN, SOB were all quite new. The patient was previously functional and currently cannot walk across the room without significant shortness of breath. Therefore, she was instructed to return to the ER for further evaluation and admission. Labs in ED notable for WBC 3.3 with ANC 1.5, Hgb 7.5, PLT 67. The patient and her daughter report that she was seen by primary care 2 weeks ago given increased abdominal distention and pain. Reportedly a CXR showed possible pulmonary edema. The Lasix dose was increased from 40 to 80mg with some improvement in symptoms per patient. No melena/hematochezia/hematemesis. No recent fevers, chills, sweats, unintentional weight loss. No h/o vitamin deficiencies. Family at bedside,  and daughter. Report appt with Dr. Norberto Harrison on 5/4/2018.      Past Medical History:   Diagnosis Date    Anemia     Cirrhosis ([address]     ROBBINS     GERD (gastroesophageal reflux disease)     H/O bone density study 10/28/2016    Osteopenia    Hx of bone density study 08/09/06    Normal x2    Hypermenorrhea     Hypertension     Leiomyoma of uterus, unspecified     Menopausal syndrome     Osteopenia 2016    Thrombocytopenia (HCC)     Thromboembolus ([address]     Transitional cell bladder cancer [facility])     Has been cleared by urology      Past Surgical History:   Procedure Laterality Date    HX HYSTEROSCOPY  03/1996    w/ D&C    HX OTHER SURGICAL  02/19/10    tumor in bladder removed x2 in 2010    HX OTHER SURGICAL  03/25/10    Liver Biopsy--Cirrhosis    HX TUBAL LIGATION      HX UROLOGICAL      tumor removed from bladder      Social History   Substance Use Topics    Smoking status: Never Smoker    Smokeless tobacco: Never Used    Alcohol use No      Family History   Problem Relation Age of Onset    Diabetes Brother      Type II    Hypertension Brother     Heart Disease Father     Heart Attack Father 64    Hypertension Father     Hypertension Mother     Osteoporosis Mother      +Hip fracture    Hypertension Sister      Current Facility-Administered Medications   Medication Dose Route Frequency    sodium chloride (NS) flush 5-10 mL  5-10 mL IntraVENous Q8H    sodium chloride (NS) flush 5-10 mL  5-10 mL IntraVENous PRN     Current Outpatient Prescriptions   Medication Sig    furosemide (LASIX) 80 mg tablet Take 80 mg by mouth daily.  furosemide (LASIX) 20 mg tablet Take 20 mg by mouth two (2) times a day.  omeprazole (PRILOSEC) 20 mg capsule Take 20 mg by mouth daily.  metoprolol (LOPRESSOR) 25 mg tablet Take 25 mg by mouth daily.  albuterol (PROVENTIL VENTOLIN) 2.5 mg /3 mL (0.083 %) nebulizer solution by Nebulization route every four (4) hours as needed for Wheezing. No Known Allergies     Review of Systems: A complete review of systems was obtained, negative except as described above.     Physical Exam:     Visit Vitals    /70 (BP 1 Location: Right arm, BP Patient Position: At rest)    Pulse 74    Temp 98.7 °F (37.1 °C)    Resp 16    Ht 5' 4\" (1.626 m)    Wt 125 kg (275 lb 9.2 oz)    SpO2 92%    BMI 47.3 kg/m2     ECOG PS: 1  General: No distress, obese  Eyes: PERRLA, anicteric sclerae  HENT: Atraumatic with normal appearance of ears and nose; OP clear  Neck: Supple; no thyromegaly   Lymphatic: No cervical, supraclavicular, or axillary adenopathy  Respiratory: CTAB, normal respiratory effort  CV: Normal rate, regular rhythm, no murmurs, 3+ pitting edema in bilateral lower extremites  GI: Distended and mildly tender, but soft, no masses. Cannot appreciate HSM due to body habitus. MS: Digits without clubbing or cyanosis. Skin: No rashes, ecchymoses, or petechiae. Normal temperature, turgor, and texture. Neuro/Psych: Alert, oriented, appropriate affect, normal judgment/insight      Results:     Lab Results   Component Value Date/Time    WBC 3.3 (L) 03/26/2018 01:40 PM    HGB 7.5 (L) 03/26/2018 01:40 PM    HCT 24.8 (L) 03/26/2018 01:40 PM    PLATELET 67 (L) 83/11/9975 01:40 PM    MCV 95.0 03/26/2018 01:40 PM    ABS. NEUTROPHILS 1.5 (L) 03/26/2018 01:40 PM     Lab Results   Component Value Date/Time    Sodium 140 03/26/2018 01:40 PM    Potassium 4.3 03/26/2018 01:40 PM    Chloride 107 03/26/2018 01:40 PM    CO2 26 03/26/2018 01:40 PM    Glucose 96 03/26/2018 01:40 PM    BUN 18 03/26/2018 01:40 PM    Creatinine 1.00 03/26/2018 01:40 PM    GFR est AA >60 03/26/2018 01:40 PM    GFR est non-AA 55 (L) 03/26/2018 01:40 PM    Calcium 8.5 03/26/2018 01:40 PM     Lab Results   Component Value Date/Time    Bilirubin, total 2.8 (H) 03/26/2018 01:40 PM    ALT (SGPT) 33 03/26/2018 01:40 PM    AST (SGOT) 60 (H) 03/26/2018 01:40 PM    Alk. phosphatase 147 (H) 03/26/2018 01:40 PM    Protein, total 6.8 03/26/2018 01:40 PM    Albumin 2.3 (L) 03/26/2018 01:40 PM    Globulin 4.5 (H) 03/26/2018 01:40 PM     Lab Results   Component Value Date/Time    Reticulocyte count 3.2 (H) 03/26/2018 01:40 PM    Vitamin B12 952 (H) 12/18/2017 02:36 AM     (H) 03/26/2018 01:40 PM    Sed rate, automated 19 12/16/2017 06:31 PM    C-Reactive protein 3.18 (H) 12/16/2017 06:31 PM    Lipase 507 (H) 03/25/2018 01:24 PM    Hep C  virus Ab Interp.  NONREACTIVE 12/18/2017 02:36 AM     Lab Results Component Value Date/Time    INR 1.5 (H) 12/18/2017 02:36 AM    aPTT 33.0 (H) 12/16/2017 06:37 PM    Antithrombin Activity 50 (L) 12/16/2017 07:51 PM    Protein S, Total 38 (L) 12/16/2017 07:51 PM    Protein S, Free 65 12/16/2017 07:51 PM    Protein C Antigen 25 (L) 12/16/2017 07:51 PM     Lab Results   Component Value Date/Time     (H) 03/26/2018 01:40 PM     Lab Results   Component Value Date/Time    Reticulocyte count 3.2 (H) 03/26/2018 01:40 PM    Absolute Retic Cnt. 0.0822 (H) 03/26/2018 01:40 PM     Lab Results   Component Value Date/Time    Ferritin 20 03/26/2018 01:40 PM     Lab Results   Component Value Date/Time    Iron 39 03/26/2018 01:40 PM    TIBC 326 03/26/2018 01:40 PM    Iron % saturation 12 (L) 03/26/2018 01:40 PM    Ferritin 20 03/26/2018 01:40 PM     3/26/2018 XR CHEST  IMPRESSION  IMPRESSION: No evidence of acute cardiopulmonary process. Assessment and Recommendations:   78 y/o female with hx of bladder cancer, ROBBINS cirrhosis and h/o esophageal varices admitted with worsening symptomatic anemia and abd pain. 1. Normocytic anemia / Iron deficiency anemia: Initially suspected GI blood loss given sudden 3 gram drop in Hgb over the course of 2 weeks. However, retic count only 3.2% with RPI of 0.9, which is an inadequate response to this level of anemia. Iron profile is c/w iron deficiency and patient would benefit from parenteral iron. Retic count can be low in the setting of iron deficiency though. I agree with further evaluation with bone marrow biopsy, which has been ordered. Viral etiology for worsening anemia less likely. -- GI evaluation given iron deficiency along with anemia  -- Bone marrow biopsy  -- Transfuse 1 Unit PRBCs and repeat CBC   -- Will consider parenteral iron tomorrow. 2. Thrombocytopenia: 2/2 cirrhosis and splenic sequestration. -- Continue to monitor     3.  ROBBINS cirrhosis, esophageal varices: Patient requires regular monitoring and care from a Hepatologist such as surveillance EGDs with banding, use of Propranolol and more aggressive management of fluid overload. Pt used to be a part of the ROBBINS program at [facility], but she does not wish to go back to [facility]. She is scheduled to see Dr. Cresencio Rosa at [facility] in 5/2018.  -- Scheduled to see hepatolgist at [facility] on May 4,  2018. 4. Abdominal pain: Unclear etiology. May be due to pressure from ascites. -- Consider abd imaging in am if no improvement   -- Recommend Prn  pain medication    Patient seen in conjunction with Jen Mendenhall NP.     Signed By: Sylvain Ruiz MD

## 2018-03-26 NOTE — IP AVS SNAPSHOT
[address] 
[phone] Patient: Maryan Hernandez MRN: [id number] STS:4/79/3477 A check joya indicates which time of day the medication should be taken. My Medications START taking these medications Instructions Each Dose to Equal  
 Morning Noon Evening Bedtime  
 spironolactone 25 mg tablet Commonly known as:  ALDACTONE Your last dose was: Your next dose is: Take 1 Tab by mouth daily. 25 mg CONTINUE taking these medications Instructions Each Dose to Equal  
 Morning Noon Evening Bedtime  
 albuterol 2.5 mg /3 mL (0.083 %) nebulizer solution Commonly known as:  PROVENTIL VENTOLIN Your last dose was: Your next dose is:    
   
   
 2.5 mg by Nebulization route every four (4) hours as needed for Wheezing. 2.5 mg  
    
   
   
   
  
 LASIX 80 mg tablet Generic drug:  furosemide Your last dose was: Your next dose is: Take 40 mg by mouth two (2) times a day. 40 mg  
    
   
   
   
  
 omeprazole 20 mg capsule Commonly known as:  PRILOSEC Your last dose was: Your next dose is: Take 20 mg by mouth daily. 20 mg  
    
   
   
   
  
 ZOFRAN ODT 4 mg disintegrating tablet Generic drug:  ondansetron Your last dose was: Your next dose is: Take 4 mg by mouth every eight (8) hours as needed for Nausea. 4 mg STOP taking these medications   
 metoprolol tartrate 25 mg tablet Commonly known as:  LOPRESSOR  
   
  
 naproxen sodium 220 mg tablet Commonly known as:  NAPROSYN Where to Get Your Medications Information on where to get these meds will be given to you by the nurse or doctor. ! Ask your nurse or doctor about these medications  
  spironolactone 25 mg tablet

## 2018-03-26 NOTE — PROGRESS NOTES
[facility]: MED RECONCILIATION    Comments/Recommendations:   Interview conducted with family at bedside. Pharmacist reviewed prescription refill history with Rx Query. Verifies no known allergies  Albuterol was started when the patient was diagnosed with CHF. She uses the nebulized albuterol about twice per week for shortness of breath and finds it helps her symptoms. Metoprolol tartrate 25 mg is taken once daily. The patient was started on this medication about one year ago primarily for her ROBBINS. Furosemide - Two weeks ago the patient was started on furosemide 80 mg twice daily. On 3/20/18 the patient was reduced to Furosemide 80 mg in the morning and 40 mg in the evening. On 3/25/18 the patient was informed her CHF had improved and she was reduced to 40 mg twice daily. The patient was instructed by her liver doctor to use naproxen for fever and mild pain and to avoid acetaminophen. The patient uses naproxen very sparingly. Medications added:     · Naproxen  · Ondansetron    Medications removed:    · None    Medications adjusted:    · Furosemide changed to 40 mg twice daily    Allergies: Review of patient's allergies indicates no known allergies. Prior to Admission Medications:   Prior to Admission Medications   Prescriptions Last Dose Informant Patient Reported? Taking? albuterol (PROVENTIL VENTOLIN) 2.5 mg /3 mL (0.083 %) nebulizer solution 3/25/2018 at Unknown time  Yes Yes   Si.5 mg by Nebulization route every four (4) hours as needed for Wheezing. furosemide (LASIX) 80 mg tablet 3/26/2018 at Unknown time  Yes Yes   Sig: Take 40 mg by mouth two (2) times a day. metoprolol (LOPRESSOR) 25 mg tablet 3/26/2018 at Unknown time Family Member Yes Yes   Sig: Take 25 mg by mouth daily. naproxen sodium (NAPROSYN) 220 mg tablet   Yes Yes   Sig: Take 220 mg by mouth daily as needed for Pain.    omeprazole (PRILOSEC) 20 mg capsule 3/26/2018 at Unknown time Family Member Yes Yes   Sig: Take 20 mg by mouth daily.   ondansetron (ZOFRAN ODT) 4 mg disintegrating tablet   Yes Yes   Sig: Take 4 mg by mouth every eight (8) hours as needed for Nausea.       Facility-Administered Medications: None      Thank you,    Airam Ruiz, PharmD, BCPS

## 2018-03-26 NOTE — ED TRIAGE NOTES
Patient c/o fatigue and sob since Thursday continues to worsen. Patient was seen here last night for the same. Patients hemotologist referred her back here for admission and transfusion due to hemoglobin dropping from 10-7.

## 2018-03-27 NOTE — PROGRESS NOTES
Bedside and Verbal shift change report given to Michaela Blankenship RN (oncoming nurse) by Kalli Cazares RN (offgoing nurse).  Report included the following information SBAR, Kardex, ED Summary, Intake/Output, MAR, Accordion and Recent Results. '

## 2018-03-27 NOTE — PROGRESS NOTES
Received a phone call from Valery Kuo in [facility] [employer] stating that patient will have bone marrow biopsy in am and to hold blood thinners and make sure the patient is NPO after midnight.

## 2018-03-27 NOTE — PROGRESS NOTES
Chart reviewed for CT guided bone marrow biopsy. Areas reviewed include, but are not limited to, patient's current and past H&P, Lab and Procedure Results, all notes, media records and medications. Spoke to [employer] and plan to do Bone marrow biopsy this AM.    0835 Pt rec'd via stretcher to [employer] from  533 for CT guided bone marrow biopsy. Pt is alert and oriented x 3, denies discomfort at present. Pt states she has been NPO since last night. 20 GA PIV L AC flushed with saline and patent. 20 GA PIV R hand flushed with saline and patent. No redness or swelling noted at either site. Lungs with few crackles R base, otherwise CTA. Awaiting radiologist for consent. Pt obese with proportionately large extremities but noted 3+ pitting edema of both LE to knees. Pt states this is usual.    0930 Dr Katrina Allison in to discuss risks and benefits of procedure with patient. Plan for sedation made, consent obtained. [facility] availability. 1030 to CT via stretcher    1105 Procedure complete. Start time [id number], stop time 1104. Total of Versed 2 mg and Fentanyl 50 mcg IV used for sedation. Pt tolerated well. Gauze and transparent dressing applied to sacral biopsy site. No bleeding or other drainage noted at present. Back to [facility] for recovery. 1130 Dr Katrina Allison in to check on patient. Pt is sleeping at intervals. Pt snores and gasps when asleep in supine position; pulse ox 96 on room air. Pt taking water and soda without difficulty. 1200 Telephone report to [employer] on pt s/p bone marrow biopsy with conscious sedation. Reviewed with patient methods of pain control for biopsy site, such as Tylenol or ice pack. Provided to her written copy of the discharge instructions for OP bone marrow biopsy for her reference.

## 2018-03-27 NOTE — CONSULTS
[address] [place] [address]. [address] [place] [address] NP  [phone]                    GASTROENTEROLOGY CONSULTATION NOTE              NAME:  Sharon Hall   :   1950   MRN:   [id number]       Referring Physician:   Dr Pillo Valencia      Consult Date:   3/27/2018 5:38 PM    Chief Complaint:    Lethargy     History of Present Illness:    Patient is a 77 y.o. female who we were asked to evaluate for anemia. HPI was obtained from pt and her family member who is a PA at her PCP office. Two weeks ago the patient began to feel lethargic and increasingly tired. She had seen her PCP and found that her hemoglobin had decreased from 10.2 at 7.2. She has noted on tarry stools or blood in stools. She had EGD in  which showed varicies. Last colonoscopy was about 9 year ago. She states she has been treated for GERD with Prilosec which seems to be controlling her symptoms. She does not smoke or drink alcohol. Takes Alieve about once per month. She has diagnosis of ROBBINS and her hepatologist has advised to continue further monitoring by her PCP. PMH:  Past Medical History:   Diagnosis Date    Anemia     GERD (gastroesophageal reflux disease)     H/O bone density study 10/28/2016    Osteopenia    Hypermenorrhea     Hypertension     Leiomyoma of uterus, unspecified     Liver cirrhosis secondary to ROBBINS ([address]     Menopausal syndrome     Osteopenia 2016    Thrombocytopenia ([address]     Transitional cell bladder cancer ([address]     Has been cleared by urology       [address]:  Past Surgical History:   Procedure Laterality Date    HX HYSTEROSCOPY  1996    w/ D&C    HX OTHER SURGICAL  02/19/10    tumor in bladder removed x2 in     HX OTHER SURGICAL  03/25/10    Liver Biopsy--Cirrhosis    HX TUBAL LIGATION      HX UROLOGICAL      tumor removed from bladder       Allergies:  No Known Allergies    Home Medications:  Prior to Admission Medications   Prescriptions Last Dose Informant Patient Reported? Taking? albuterol (PROVENTIL VENTOLIN) 2.5 mg /3 mL (0.083 %) nebulizer solution 3/25/2018 at Unknown time  Yes Yes   Si.5 mg by Nebulization route every four (4) hours as needed for Wheezing. furosemide (LASIX) 80 mg tablet 3/26/2018 at Unknown time  Yes Yes   Sig: Take 40 mg by mouth two (2) times a day. metoprolol (LOPRESSOR) 25 mg tablet 3/26/2018 at Unknown time Family Member Yes Yes   Sig: Take 25 mg by mouth daily. naproxen sodium (NAPROSYN) 220 mg tablet   Yes Yes   Sig: Take 220 mg by mouth daily as needed for Pain. omeprazole (PRILOSEC) 20 mg capsule 3/26/2018 at Unknown time Family Member Yes Yes   Sig: Take 20 mg by mouth daily. ondansetron (ZOFRAN ODT) 4 mg disintegrating tablet   Yes Yes   Sig: Take 4 mg by mouth every eight (8) hours as needed for Nausea.       Facility-Administered Medications: None       Hospital Medications:  Current Facility-Administered Medications   Medication Dose Route Frequency    furosemide (LASIX) tablet 40 mg  40 mg Oral ACB&D    sodium chloride (NS) flush 5-10 mL  5-10 mL IntraVENous Q8H    sodium chloride (NS) flush 5-10 mL  5-10 mL IntraVENous PRN    pantoprazole (PROTONIX) tablet 40 mg  40 mg Oral ACB    acetaminophen (TYLENOL) tablet 650 mg  650 mg Oral Q4H PRN    diphenhydrAMINE (BENADRYL) capsule 25 mg  25 mg Oral Q4H PRN    ondansetron (ZOFRAN) injection 4 mg  4 mg IntraVENous Q4H PRN    0.9% sodium chloride infusion 250 mL  250 mL IntraVENous PRN       Social History:  Social History   Substance Use Topics    Smoking status: Never Smoker    Smokeless tobacco: Never Used    Alcohol use No       Family History:  Family History   Problem Relation Age of Onset    Diabetes Brother      Type II    Hypertension Brother     Heart Disease Father     Heart Attack Father 64    Hypertension Father     Hypertension Mother     Osteoporosis Mother      +Hip fracture    Hypertension Sister        Review of Systems:  Constitutional: negative fever, negative chills, negative weight loss  Eyes:   negative visual changes  ENT:   negative sore throat, tongue or lip swelling  Respiratory:  negative cough, negative dyspnea  Cards:  negative for chest pain, palpitations, lower extremity edema  GI:   See HPI  :  negative for frequency, dysuria  Integument:  negative for rash and pruritus  Heme:  negative for easy bruising and gum/nose bleeding  Musculoskel: negative for myalgias,  back pain and muscle weakness  Neuro: negative for headaches, dizziness, vertigo  Psych:  negative for feelings of anxiety, depression     Objective:   Patient Vitals for the past 8 hrs:   BP Temp Pulse Resp SpO2   03/27/18 1456 - - 76 - 94 %   03/27/18 1455 - - 78 - 92 %   03/27/18 1445 109/59 - 73 - 95 %   03/27/18 1435 111/51 - 68 - 95 %   03/27/18 1240 118/68 97.4 °F (36.3 °C) 66 22 97 %   03/27/18 1155 127/62 - 71 22 96 %   03/27/18 1140 111/54 - 67 17 96 %   03/27/18 1115 118/54 - 66 11 96 %   03/27/18 1100 119/60 - 69 18 98 %   03/27/18 1055 121/65 - 68 18 98 %   03/27/18 1050 114/70 - 65 19 99 %   03/27/18 1045 120/66 - 69 19 99 %   03/27/18 1040 130/62 - 70 13 99 %   03/27/18 1037 134/66 - 70 14 98 %        03/25 1901 - 03/27 0700  In: 208.8   Out: -     EXAM:     NEURO-alert, oriented x3, affect appropriate, no focal neurological deficits, moves all extremities well   HEENT-Head: Normocephalic, no lesions, without obvious abnormality. LUNGS-clear to auscultation bilaterally    COR-regular rate and rhythym     ABD- soft, non-tender.  Bowel sounds normal. No masses,  no organomegaly     EXT-no edema    Skin - No rash     Data Review     Recent Labs      03/27/18   0513  03/26/18   1340   WBC  4.0  3.3*   HGB  7.7*  7.5*   HCT  25.1*  24.8*   PLT  42*  67*     Recent Labs      03/27/18   0513  03/26/18   1340   NA  140  140   K  4.2  4.3   CL  107  107   CO2  28  26   BUN  21*  18   CREA  0.97  1.00   GLU  97  96   CA  8.3*  8.5     Recent Labs      03/26/18   1340  03/25/18   1324 SGOT  60*  59*   AP  147*  150*   TP  6.8  6.9   ALB  2.3*  2.4*   GLOB  4.5*  4.5*   LPSE   --   507*     No results for input(s): INR, PTP, APTT in the last 72 hours. No lab exists for component: INREXT    Patient Active Problem List   Diagnosis Code    Menopausal syndrome N95.1    Osteoporosis screening Z13.820    Obesity, morbid ([facility] E66.01    Thrombocytopenia ([facility] D69.6    Cellulitis L03.90    DVT (deep venous thrombosis) (HCC) I82.409    Liver cirrhosis secondary to ROBBINS ([facility] K75.81, K74.60    Esophageal varices (HCC) I85.00    Anemia D64.9    Dyspnea on effort R06.09    Hypertension I10    Osteopenia M85.80    Pancytopenia (HCC) D61.818    Leukopenia D72.819    GERD (gastroesophageal reflux disease) K21.9       Assessment and Plan:  Anemia: FOBT negative but she has had an acute drop in hemoglobin and is symptomatic.  - Plan for EGD and colonoscopy tomorrow.  - NPO at midnight  - Continue to trend HgB and transfuse to goal of 7.  - Continue PPI    Cirrhosis: stable. No acute decompensation  - Monitor CMP. - Monitor Coags. - Continue current lasix dose. Will continue to follow along. Thanks for allowing me to participate in the care of this patient.   Signed By: Diane Phillip NP     3/27/2018  5:38 PM

## 2018-03-27 NOTE — PROGRESS NOTES
physical Therapy EVALUATION/DISCHARGE  Patient: Kathleen Canela (33 y.o. female)  Date: 3/27/2018  Primary Diagnosis: Anemia  Anemia        Precautions: standard, falls     ASSESSMENT :  Based on the objective data described below, the patient presents with baseline functional strength, ROM, transfers and gait following admission for anemia and GOLDMAN. Patient denies current SOB. Currently at 7.7 Hgb and no complaints of dizziness when up. Had bone marrow bx this AM; awaiting results. Patient currently reports her only pain is at bx site and rates it 4/10. Patient was able to stand and ambulate without assistive device and with supervision only. No overt loss of balance. O2 sats well maintained on room air ranging from 92-95%. Cues given for patient to breathe properly. She is steady on her feet and at low risk for falls. Patient has chronic edema to BLE's. She may benefit from lymphedema consult. She is safe to ambulate with nursing supervision. Skilled physical therapy is not indicated at this time. PLAN :  Discharge Recommendations: Outpatient (lymphedema consult recommended)  Further Equipment Recommendations for Discharge: none     SUBJECTIVE:   Patient stated I am doing much better.     OBJECTIVE DATA SUMMARY:   HISTORY:    Past Medical History:   Diagnosis Date    Anemia     GERD (gastroesophageal reflux disease)     H/O bone density study 10/28/2016    Osteopenia    Hypermenorrhea     Hypertension     Leiomyoma of uterus, unspecified     Liver cirrhosis secondary to ROBBINS ([address]     Menopausal syndrome     Osteopenia 2016    Thrombocytopenia ([address]     Transitional cell bladder cancer ([address]     Has been cleared by urology     Past Surgical History:   Procedure Laterality Date    HX HYSTEROSCOPY  03/1996    w/ D&C    HX OTHER SURGICAL  02/19/10    tumor in bladder removed x2 in 2010    HX OTHER SURGICAL  03/25/10    Liver Biopsy--Cirrhosis    HX TUBAL LIGATION      HX UROLOGICAL      tumor removed from bladder     Prior Level of Function/Home Situation: independent; recently retired  Personal factors and/or comorbidities impacting plan of care: none though bx results pending. Home Situation  Home Environment: Private residence  # Steps to Enter: 3  Rails to Enter: Yes  One/Two Story Residence: One story  Living Alone: No  Support Systems: Family member(s), [employer] / lexy community  Patient Expects to be Discharged to[de-identified] Private residence  Current DME Used/Available at Home: Nebulizer    EXAMINATION/PRESENTATION/DECISION MAKING:   Critical Behavior:  Neurologic State: Alert  Orientation Level: Oriented X4     Safety/Judgement: Insight into deficits  Hearing: Auditory  Auditory Impairment: None  Skin:  Not fully observed  Edema: chronic BLE edema  Range Of Motion:  AROM: Generally decreased, functional                       Strength:    Strength: Generally decreased, functional                    Tone & Sensation:   Tone: Normal              Sensation: Intact                         Functional Mobility:  Bed Mobility:     Supine to Sit: Modified independent; Additional time  Sit to Supine: Modified independent     Transfers:  Sit to Stand: Contact guard assistance  Stand to Sit: Supervision                       Balance:   Sitting: Intact  Standing: Intact  Ambulation/Gait Training:  Distance (ft): 125 Feet (ft)  Assistive Device: Gait belt  Ambulation - Level of Assistance: Contact guard assistance        Gait Abnormalities: Trunk sway increased        Base of Support: Widened                                           Therapeutic Exercises:   Instructed her in pursed lip breathing    Functional Measure:  Tinetti test:    Sitting Balance: 1  Arises: 1  Attempts to Rise: 2  Immediate Standing Balance: 2  Standing Balance: 2  Nudged: 2  Eyes Closed: 1  Turn 360 Degrees - Continuous/Discontinuous: 1  Turn 360 Degrees - Steady/Unsteady: 1  Sitting Down: 2  Balance Score: 15  Indication of Gait: 1  R Step Length/Height: 1  L Step Length/Height: 1  R Foot Clearance: 1  L Foot Clearance: 1  Step Symmetry: 1  Step Continuity: 1  Path: 2  Trunk: 0  Walking Time: 1  Gait Score: 10  Total Score: 25       Tinetti Test and G-code impairment scale:  Percentage of Impairment CH    0%   CI    1-19% CJ    20-39% CK    40-59% CL    60-79% CM    80-99% CN     100%   Tinetti  Score 0-28 28 23-27 17-22 12-16 6-11 1-5 0       Tinetti Tool Score Risk of Falls  <19 = High Fall Risk  19-24 = Moderate Fall Risk  25-28 = Low Fall Risk  Tinetti ME. Performance-Oriented Assessment of Mobility Problems in Elderly Patients. [place] [address]; [id number]. (Scoring Description: PT Bulletin Feb. 10, 1993)    Older adults: Rich Tobin et al, 2009; n = 1000 [facility] elderly evaluated with ABC, VALORIE, ADL, and IADL)  · Mean VALORIE score for males aged 69-68 years = 26.21(3.40)  · Mean VALORIE score for females age 69-68 years = 25.16(4.30)  · Mean VALORIE score for males over 80 years = 23.29(6.02)  · Mean VALORIE score for females over 80 years = 17.20(8.32)            Physical Therapy Evaluation Charge Determination   History Examination Presentation Decision-Making   LOW Complexity : Zero comorbidities / personal factors that will impact the outcome / POC LOW Complexity : 1-2 Standardized tests and measures addressing body structure, function, activity limitation and / or participation in recreation  LOW Complexity : Stable, uncomplicated  Other outcome measures [place]  LOW       Based on the above components, the patient evaluation is determined to be of the following complexity level: LOW     Pain:  Pain Scale 1: Numeric (0 - 10)  Pain Intensity 1: 4  Activity Tolerance:   good  Please refer to the flowsheet for vital signs taken during this treatment.   After treatment:   []   Patient left in no apparent distress sitting up in chair  [x]   Patient left in no apparent distress in bed  [x]   Call bell left within reach  [x]   Nursing notified  [x]   Caregiver present  [] Bed alarm activated    COMMUNICATION/EDUCATION:   Communication/Collaboration:  [x]   Fall prevention education was provided and the patient/caregiver indicated understanding. [x]   Patient/family have participated as able and agree with findings and recommendations. []   Patient is unable to participate in plan of care at this time.   Findings and recommendations were discussed with: Registered Nurse and Occupational Therapist    Thank you for this referral.  Meagan Carr, PT   Time Calculation: 27 mins

## 2018-03-27 NOTE — PROGRESS NOTES
Chart reviewed and attempted to see patient. She is still off floor for bone marrow bx procedure. Family present in room. PT will return later to attempt evaluation. Nursing aware.

## 2018-03-27 NOTE — PROGRESS NOTES
[facility] [place] [facility] [place] [address] [address] [facility] [address] [facility]  [facility]: [phone]  F: [phone]      Reason for Visit:   Norris Kruger is a 77 y.o. female who is seen in consultation for continuity of care, new symptomatic anemia. History of Present Illness:   Ms. Callie Rose is a 76 y/o female with ROBBINS cirrhosis who p/w SOB, GOLDMAN, fatigue as well as lower abdominal pain admitted with a new symptomatic anemia. She was seen in the ED yesterday and found to have Hgb 7.7, stool hemoccult negative. She was sent home yesterday with plan for close f/u in [address] [place] [address] clinic. However, patient's primary care PA called stating that patient's Hgb was > 10 only weeks ago, and that patient's GOLDMAN, SOB were all quite new. The patient was previously functional and currently cannot walk across the room without significant shortness of breath. Therefore, she was instructed to return to the ER for further evaluation and admission. Labs in ED notable for WBC 3.3 with ANC 1.5, Hgb 7.5, PLT 67. The patient and her daughter report that she was seen by primary care 2 weeks ago given increased abdominal distention and pain. Reportedly a CXR showed possible pulmonary edema. The Lasix dose was increased from 40 to 80mg with some improvement in symptoms per patient. No melena/hematochezia/hematemesis. No recent fevers, chills, sweats, unintentional weight loss. No h/o vitamin deficiencies. Family at bedside,  and daughter. Report appt with Dr. Akash Nazario on 5/4/2018. Interval History:     Procedure a little more difficult than thought; denies any pain at bx site; denies any SOB. Feeling better today. Continues with abd pain described as \"pressure\" when up ambulating. Rates pain 4/10; yesterday was 10/10 and Tylenol does help        and family at bedside.       Current Facility-Administered Medications   Medication Dose Route Frequency    midazolam (VERSED) injection 5 mg  5 mg IntraVENous RAD PRN    fentaNYL citrate (PF) injection 100 mcg  100 mcg IntraVENous RAD PRN    sodium chloride (NS) flush 5-10 mL  5-10 mL IntraVENous Q8H    sodium chloride (NS) flush 5-10 mL  5-10 mL IntraVENous PRN    pantoprazole (PROTONIX) tablet 40 mg  40 mg Oral ACB    dextrose 5% - 0.45% NaCl with KCl 20 mEq/L infusion  75 mL/hr IntraVENous CONTINUOUS    acetaminophen (TYLENOL) tablet 650 mg  650 mg Oral Q4H PRN    diphenhydrAMINE (BENADRYL) capsule 25 mg  25 mg Oral Q4H PRN    ondansetron (ZOFRAN) injection 4 mg  4 mg IntraVENous Q4H PRN    0.9% sodium chloride infusion 250 mL  250 mL IntraVENous PRN      No Known Allergies     Review of Systems: A complete review of systems was obtained, negative except as described above. Physical Exam:     Visit Vitals    /64 (BP 1 Location: Right arm, BP Patient Position: Supine)    Pulse 70    Temp 98.4 °F (36.9 °C)    Resp 12    Ht 5' 4\" (1.626 m)    Wt 125 kg (275 lb 9.2 oz)    SpO2 94%    BMI 47.3 kg/m2     ECOG PS: 1  General: No distress, obese  Eyes:  anicteric sclerae  HENT: Atraumatic with normal appearance of ears and nose; OP clear  Neck: Supple; no thyromegaly   Respiratory:  normal respiratory effort  CV: Normal rate, regular rhythm, no murmurs, 2+ pitting edema in bilateral lower extremites  GI: Distended and mildly tender, but soft, no masses. Cannot appreciate HSM due to body habitus. MS: Digits without clubbing or cyanosis. Skin: No rashes, ecchymoses, or petechiae. Normal temperature, turgor, and texture. Neuro/Psych: Alert, oriented, appropriate affect, normal judgment/insight      Results:     Lab Results   Component Value Date/Time    WBC 4.0 03/27/2018 05:13 AM    HGB 7.7 (L) 03/27/2018 05:13 AM    HCT 25.1 (L) 03/27/2018 05:13 AM    PLATELET 42 (LL) 75/77/2065 05:13 AM    MCV 93.7 03/27/2018 05:13 AM    ABS.  NEUTROPHILS 1.5 (L) 03/26/2018 01:40 PM     Lab Results   Component Value Date/Time    Sodium 140 03/27/2018 05:13 AM    Potassium 4.2 03/27/2018 05:13 AM    Chloride 107 03/27/2018 05:13 AM    CO2 28 03/27/2018 05:13 AM    Glucose 97 03/27/2018 05:13 AM    BUN 21 (H) 03/27/2018 05:13 AM    Creatinine 0.97 03/27/2018 05:13 AM    GFR est AA >60 03/27/2018 05:13 AM    GFR est non-AA 57 (L) 03/27/2018 05:13 AM    Calcium 8.3 (L) 03/27/2018 05:13 AM     Lab Results   Component Value Date/Time    Bilirubin, total 2.8 (H) 03/26/2018 01:40 PM    ALT (SGPT) 33 03/26/2018 01:40 PM    AST (SGOT) 60 (H) 03/26/2018 01:40 PM    Alk. phosphatase 147 (H) 03/26/2018 01:40 PM    Protein, total 6.8 03/26/2018 01:40 PM    Albumin 2.3 (L) 03/26/2018 01:40 PM    Globulin 4.5 (H) 03/26/2018 01:40 PM     Lab Results   Component Value Date/Time    Reticulocyte count 3.2 (H) 03/26/2018 01:40 PM    Iron % saturation 12 (L) 03/26/2018 01:40 PM    TIBC 326 03/26/2018 01:40 PM    Ferritin 20 03/26/2018 01:40 PM    Vitamin B12 1215 (H) 03/26/2018 01:40 PM    Folate 12.7 03/26/2018 01:40 PM    Haptoglobin 16 (L) 03/26/2018 01:40 PM     (H) 03/26/2018 01:40 PM    Sed rate, automated 19 12/16/2017 06:31 PM    C-Reactive protein 3.18 (H) 12/16/2017 06:31 PM    Lipase 507 (H) 03/25/2018 01:24 PM    Hep C  virus Ab Interp.  NONREACTIVE 12/18/2017 02:36 AM     Lab Results   Component Value Date/Time    INR 1.5 (H) 12/18/2017 02:36 AM    aPTT 33.0 (H) 12/16/2017 06:37 PM    Antithrombin Activity 50 (L) 12/16/2017 07:51 PM    Protein S, Total 38 (L) 12/16/2017 07:51 PM    Protein S, Free 65 12/16/2017 07:51 PM    Protein C Antigen 25 (L) 12/16/2017 07:51 PM     Lab Results   Component Value Date/Time     (H) 03/26/2018 01:40 PM     Lab Results   Component Value Date/Time    Reticulocyte count 3.2 (H) 03/26/2018 01:40 PM    Absolute Retic Cnt. 0.0822 (H) 03/26/2018 01:40 PM     Lab Results   Component Value Date/Time    Ferritin 20 03/26/2018 01:40 PM     Lab Results   Component Value Date/Time    Iron 39 03/26/2018 01:40 PM    TIBC 326 03/26/2018 01:40 PM    Iron % saturation 12 (L) 03/26/2018 01:40 PM    Ferritin 20 03/26/2018 01:40 PM     3/26/2018 XR CHEST  IMPRESSION  IMPRESSION: No evidence of acute cardiopulmonary process. Assessment and Recommendations:   76 y/o female with hx of bladder cancer, ROBBINS cirrhosis and h/o esophageal varices admitted with worsening symptomatic anemia and abd pain. 1. Normocytic anemia (s/p 1 unit PRBCs)  Iron deficiency anemia: Initially suspected GI blood loss given sudden 3 gram drop in Hgb over the course of 2 weeks. However, retic count only 3.2% with RPI of 0.9, which is an inadequate response to this level of anemia. Iron profile is c/w iron deficiency and patient would benefit from parenteral iron. Received 1 dose of IV Fe this am (3/27). Retic count can be low in the setting of iron deficiency though. Further evaluation with bone marrow biopsy;  Viral etiology for worsening anemia less likely. -- GI evaluation on hold  -- Bone marrow biopsy today ( 3/27); await results      2. Thrombocytopenia: 2/2 cirrhosis and splenic sequestration. -- Continue to monitor   --Hold anticoagulation for plt ct < 50K    3. ROBBINS cirrhosis, esophageal varices: Patient requires regular monitoring and care from a Hepatologist such as surveillance EGDs with banding, use of Propranolol and more aggressive management of fluid overload. Pt used to be a part of the ROBBINS program at [facility] [place], operated by [facility], but she does not wish to go back to [facility]. She is scheduled to see Dr. Yinka Olson at [facility] in 5/2018.  -- Scheduled to see hepatolgist at [facility] on May 4,  2018. 4. Abdominal pain: Unclear etiology. May be due to pressure from ascites. -- Consider abd imaging if continues  -- Recommend Prn  pain medication      Plan reviewed with Dr Dimas Adamson.      Signed By: Kareen Ballard NP

## 2018-03-27 NOTE — PROGRESS NOTES
3- CASE MANAGEMENT NOTE:  I met with the pt and her daughter, Bernice Jimenez (t-[phone]), to determine potential discharge needs. The pt lives with her , Jamaal Hager ([phone]), in a one story house with 2 entry steps. She is independent with her ADL's, recently retired, has no assistive devices and drives. Her PCP is Dr. Camilo Kapadia (he is also her son-in-law) in [place]. She has prescription drug coverage and gets her medications from [address] [place] [address]. Observation status was explained to them, she signed the Medicare and [facility], was given copies and the originals were placed on her chart. She is not anticipating any discharge needs at this time. Care Management Interventions  PCP Verified by CM:  Yes (Dr. Vinny Falk)  Discharge Durable Medical Equipment: No  Physical Therapy Consult: Yes  Occupational Therapy Consult: Yes  Current Support Network: Lives with Spouse, Own Home  Confirm Follow Up Transport: Family  Plan discussed with Pt/Family/Caregiver: Yes  Discharge Location  Discharge Placement:  (Home)    WENDY Forte, CM

## 2018-03-27 NOTE — PROGRESS NOTES
Otis Samuels [address]  [address] [facility], [address] [place] [address]  [phone]      Medical Progress Note      NAME: Luis Mccall   :  1950  MRM:  [id number]    Date/Time: 3/27/2018  5:15 PM         Subjective:     Chief Complaint:  \"I'm okay\"     Pt without complaints. Ab pain has resolved. Pt is s/p BMB this AM. Tolerated this well. ROS:  (bold if positive, if negative)           Objective:       Vitals:          Last 24hrs VS reviewed since prior progress note.  Most recent are:    Visit Vitals    /59 (BP Patient Position: Sitting)    Pulse 76    Temp 97.4 °F (36.3 °C)    Resp 22    Ht 5' 4\" (1.626 m)    Wt 125 kg (275 lb 9.2 oz)    SpO2 94%    BMI 47.3 kg/m2     SpO2 Readings from Last 6 Encounters:   18 94%   18 96%   18 99%   17 95%    O2 Flow Rate (L/min): 2 l/min     Intake/Output Summary (Last 24 hours) at 18 1715  Last data filed at 18 2341   Gross per 24 hour   Intake            208.8 ml   Output                0 ml   Net            208.8 ml          Exam:     Physical Exam:  Gen:  Well-developed, well-nourished, in no acute distress  HEENT:  Pink conjunctivae, PERRL, hearing intact to voice, moist mucous membranes  Neck:  Supple, without masses, thyroid non-tender  Resp:  No accessory muscle use, clear breath sounds without wheezes rales or rhonchi  Card:  No murmurs, normal S1, S2 without thrills, bruits or peripheral edema  Abd:  Soft, non-tender, non-distended, normoactive bowel sounds are present  Musc:  No cyanosis or clubbing  Skin:  No rashes or ulcers, skin turgor is good  Neuro:  Cranial nerves 3-12 are grossly intact,  strength is 5/5 bilaterally and dorsi / plantarflexion is 5/5 bilaterally, follows commands appropriately  Psych:  Good insight, oriented to person, place and time, alert      Medications Reviewed: (see below)    Lab Data Reviewed: (see below)    ______________________________________________________________________    Medications:     Current Facility-Administered Medications   Medication Dose Route Frequency    furosemide (LASIX) tablet 40 mg  40 mg Oral ACB&D    sodium chloride (NS) flush 5-10 mL  5-10 mL IntraVENous Q8H    sodium chloride (NS) flush 5-10 mL  5-10 mL IntraVENous PRN    pantoprazole (PROTONIX) tablet 40 mg  40 mg Oral ACB    acetaminophen (TYLENOL) tablet 650 mg  650 mg Oral Q4H PRN    diphenhydrAMINE (BENADRYL) capsule 25 mg  25 mg Oral Q4H PRN    ondansetron (ZOFRAN) injection 4 mg  4 mg IntraVENous Q4H PRN    0.9% sodium chloride infusion 250 mL  250 mL IntraVENous PRN            Lab Review:     Recent Labs      03/27/18   0513  03/26/18   1340  03/25/18   1324   WBC  4.0  3.3*  5.5   HGB  7.7*  7.5*  7.7*   HCT  25.1*  24.8*  25.6*   PLT  42*  67*  55*     Recent Labs      03/27/18   0513  03/26/18   1340  03/25/18   1324   NA  140  140  142   K  4.2  4.3  4.0   CL  107  107  108   CO2  28  26  27   GLU  97  96  95   BUN  21*  18  17   CREA  0.97  1.00  0.96   CA  8.3*  8.5  8.6   MG  1.9   --   1.9   ALB   --   2.3*  2.4*   SGOT   --   60*  59*   ALT   --   33  32     No components found for: GLPOC         Assessment / Plan:   Anemia (3/26/2018) - multifactorial. Likely 2/2 cirrhosis, MARÍA and possibly bone marrow dysfunction   -s/p BMB   -s/p IV iron this AM  -repeat CBC   -stool blood negative x 2; no overt blood loss but will ask GI to eval     Thrombocytopenia ([facility] [address] (12/16/2017) - likely 2/2 aforementioned   -monitor     Leukopenia (3/26/2018) - again, as above     Cirrhosis - apparently has follow-up with hepatology in May  -resume home lasix dose   -may need to add spironolactone   -no evidence of encephalopathy   -check INR in the AM   -may need US to eval for ascites     Chronic pancytopenia in the setting of ROBBINS cirrhosis, being treated with hematology consult and bone marrow biopsy    Total time spent with patient: 28 [address] discussed with: Patient and Family    Discussed:  Code Status, Care Plan and D/C Planning    Prophylaxis:  SCD's    Disposition:  Home w/Family           ___________________________________________________    Attending Physician: Nadir Deluna MD

## 2018-03-28 NOTE — ANESTHESIA POSTPROCEDURE EVALUATION
Post-Anesthesia Evaluation and Assessment    Patient: Jory Rubin MRN: [id number]  SSN: [id number]    YOB: 1950  Age: 77 y.o. Sex: female       Cardiovascular Function/Vital Signs  Visit Vitals    /46    Pulse 83    Temp 36.7 °C (98.1 °F)    Resp 25    Ht 5' 4\" (1.626 m)    Wt 125 kg (275 lb 9.2 oz)    SpO2 100%    BMI 47.3 kg/m2       Patient is status post MAC anesthesia for Procedure(s):  ESOPHAGOGASTRODUODENOSCOPY (EGD)  COLONOSCOPY  ENDOSCOPIC POLYPECTOMY. Nausea/Vomiting: None    Postoperative hydration reviewed and adequate. Pain:  Pain Scale 1: Numeric (0 - 10) (03/28/18 1532)  Pain Intensity 1: 0 (03/28/18 1532)   Managed    Neurological Status:   Neuro  Neurologic State: Alert (03/28/18 0900)  Orientation Level: Oriented X4 (03/28/18 1052)   At baseline    Mental Status and Level of Consciousness: Arousable    Pulmonary Status:   O2 Device: Nasal cannula (03/28/18 1652)   Adequate oxygenation and airway patent    Complications related to anesthesia: None    Post-anesthesia assessment completed.  No concerns    Signed By: Sofie Miramontes MD     March 28, 2018

## 2018-03-28 NOTE — PROGRESS NOTES
Problem: Falls - Risk of  Goal: *Absence of Falls  Document Jose Armando Fall Risk and appropriate interventions in the flowsheet.    Outcome: Progressing Towards Goal  Fall Risk Interventions:  Mobility Interventions: OT consult for ADLs, Patient to call before getting OOB, PT Consult for mobility concerns, PT Consult for assist device competence         Medication Interventions: Patient to call before getting OOB, Teach patient to arise slowly

## 2018-03-28 NOTE — PROGRESS NOTES
Adriana Rizo  1950  [id number]    Situation:    Scheduled Procedure: Procedure(s):  ESOPHAGOGASTRODUODENOSCOPY (EGD)  COLONOSCOPY  Verbal report received from: Desi Payton RN  Preoperative diagnosis: anemia    Background:    Procedure: Procedure(s):  ESOPHAGOGASTRODUODENOSCOPY (EGD)  COLONOSCOPY  Physician performing procedure;  Dr Brayan Benjamin RN    NPO Status/Last PO Intake: 0830 on 3/28/2018    Pregnancy Test:Not applicable If yes, result: none    Is the patient taking Blood Thinners: NO   Is the patient diabetic:no           Does the patient have a Pacemaker/Defibrillator in place?: no   Does the patient need antibiotics before/during/after procedure: no   If the patient is having a colon, How much prep was drank? 100% of [id number]   What were the Colon prep results? Liquid clear   Does the patient have SCD in place:no   Is patient on CONTACT precautions:no        Assessment:  Are the vital signs stable prior to patient coming to ENDO?  yes  Is the patient alert/oriented and able to sign consent for the procedures:yes  How does the patient's abdomen feel prior to coming to ENDO? round and semi-soft mild bloating after prep per pt  Does the patient have a patient IV in place?  yes     Recommendation:  Family or Friend present yes     Permission to share finding with Family or Friend yes

## 2018-03-28 NOTE — PROGRESS NOTES
Patient discharged home with  and daughter. All stated an understanding of discharge instructions and prescription given to patient. To be wheeled out to car in w/c by PCT.

## 2018-03-28 NOTE — PROGRESS NOTES
Assumed care of patient from anesthesia.  Endoscope was pre-cleaned at bedside immediately following procedure by Calista Figueredo

## 2018-03-28 NOTE — PROCEDURES
[address] [facility]  [address], [address] [facility]  [phone]                   Colonoscopy Operative Report      Indications:    Iron deficiency anemia     :  Troy Chang MD    Referring Provider: BRISEYDA Augustine    Sedation:  MAC anesthesia Propofol    Procedure Details:  After informed consent was obtained with all risks and benefits of procedure explained and preoperative exam completed, the patient was taken to the endoscopy suite and placed in the left lateral decubitus position. Upon sequential sedation as per above, a digital rectal exam was performed  And was normal.  The Olympus videocolonoscope  was inserted in the rectum and carefully advanced to the cecum, which was identified by the ileocecal valve and appendiceal orifice, terminal ileum. The quality of preparation was fair. The colonoscope was slowly withdrawn with careful evaluation between folds. Retroflexion in the rectum was performed and was normal..     Findings:   Rectum: Grade 1 internal and external hemorrhoid(s); Sigmoid:     - Diverticulosis                          Food residue and stool noted  Descending Colon:     - Diverticulosis  Transverse Colon: normal- stool present  Ascending Colon: normal                                  Food residue and stool noted  Cecum: normal  Terminal Ileum: normal    Interventions:  1 complete polypectomy were performed using cold biopsy forceps and the polyps was  retrieved    Specimen Removed:  specimen #1, 4 mm in size, located in the rectum removed by cold biopsy and sent for pathology    Complications: None. EBL:  None. Recommendations:   -Await pathology.  -High fiber diet. -Repeat colonoscopy in 1 year due to sub-optimal prep  -Resume normal medication(s)  -Following along      Discharge Disposition:  Home in the company of a  when able to ambulate.       Troy Chang MD  3/28/2018  4:51 PM

## 2018-03-28 NOTE — ROUTINE PROCESS
Bedside shift change report given to Kuldeep Deleon RN (oncoming nurse) by Kalyan Salazar. Maryam Ruiz   (offgoing nurse). Report included the following information SBAR, Kardex and MAR.

## 2018-03-28 NOTE — PROGRESS NOTES
Spiritual Care Partner Volunteer visited patient in med surgical unit on 3/27/2018. This is a late entry chart note. Documented by:   Rev. Chandu Lamb.  Oliver Díaz MA, [facility]    Lead  Profession Development & Advancement

## 2018-03-28 NOTE — PROGRESS NOTES
Problem: Falls - Risk of  Goal: *Absence of Falls  Document Jose Armando Fall Risk and appropriate interventions in the flowsheet.    Fall Risk Interventions:  Mobility Interventions: Communicate number of staff needed for ambulation/transfer, Patient to call before getting OOB         Medication Interventions: Evaluate medications/consider consulting pharmacy, Patient to call before getting OOB, Teach patient to arise slowly

## 2018-03-28 NOTE — ANESTHESIA PREPROCEDURE EVALUATION
Anesthetic History   No history of anesthetic complications            Review of Systems / Medical History  Patient summary reviewed, nursing notes reviewed and pertinent labs reviewed    Pulmonary  Within defined limits                 Neuro/Psych   Within defined limits           Cardiovascular    Hypertension                   GI/Hepatic/Renal     GERD      Liver disease     Endo/Other        Morbid obesity     Other Findings              Physical Exam    Airway  Mallampati: II  TM Distance: 4 - 6 cm  Neck ROM: normal range of motion   Mouth opening: Normal     Cardiovascular    Rhythm: regular  Rate: normal         Dental  No notable dental hx       Pulmonary  Breath sounds clear to auscultation               Abdominal         Other Findings            Anesthetic Plan    ASA: 2  Anesthesia type: MAC            Anesthetic plan and risks discussed with: Patient

## 2018-03-28 NOTE — PROGRESS NOTES
[facility] at [address], [address] [place] [address]  Ousmane Fuel: [phone]  F: [phone]      Reason for Visit:   Amandeep Huber is a 77 y.o. female who is seen in consultation for continuity of care, new symptomatic anemia. History of Present Illness:   Ms. Chelsie Jean Baptiste is a 76 y/o female with ROBBINS cirrhosis who p/w SOB, GOLDMAN, fatigue as well as lower abdominal pain admitted with a new symptomatic anemia. She was seen in the ED yesterday and found to have Hgb 7.7, stool hemoccult negative. She was sent home yesterday with plan for close f/u in [address] [place] [address] clinic. However, patient's primary care PA called stating that patient's Hgb was > 10 only weeks ago, and that patient's GOLDMAN, SOB were all quite new. The patient was previously functional and currently cannot walk across the room without significant shortness of breath. Therefore, she was instructed to return to the ER for further evaluation and admission. Labs in ED notable for WBC 3.3 with ANC 1.5, Hgb 7.5, PLT 67. The patient and her daughter report that she was seen by primary care 2 weeks ago given increased abdominal distention and pain. Reportedly a CXR showed possible pulmonary edema. The Lasix dose was increased from 40 to 80mg with some improvement in symptoms per patient. No melena/hematochezia/hematemesis. No recent fevers, chills, sweats, unintentional weight loss. No h/o vitamin deficiencies. Family at bedside,  and daughter. Report appt with Dr. Yinka Olson on 5/4/2018. Interval History: On her way for EGD/Colonoscopy. Didn't sleep well last night due to prep for test. No further complaints at present.  and daughter at bedside.       Current Facility-Administered Medications   Medication Dose Route Frequency    0.9% sodium chloride infusion 250 mL  250 mL IntraVENous PRN    furosemide (LASIX) tablet 80 mg  80 mg Oral ACB&D    sodium chloride (NS) flush 5-10 mL  5-10 mL IntraVENous Q8H    sodium chloride (NS) flush 5-10 mL  5-10 mL IntraVENous PRN    pantoprazole (PROTONIX) tablet 40 mg  40 mg Oral ACB    acetaminophen (TYLENOL) tablet 650 mg  650 mg Oral Q4H PRN    diphenhydrAMINE (BENADRYL) capsule 25 mg  25 mg Oral Q4H PRN    ondansetron (ZOFRAN) injection 4 mg  4 mg IntraVENous Q4H PRN    0.9% sodium chloride infusion 250 mL  250 mL IntraVENous PRN      No Known Allergies     Review of Systems: A complete review of systems was obtained, negative except as described above. Physical Exam:     Visit Vitals    /48    Pulse 75    Temp 97.8 °F (36.6 °C)    Resp 16    Ht 5' 4\" (1.626 m)    Wt 125 kg (275 lb 9.2 oz)    SpO2 96%    BMI 47.3 kg/m2     ECOG PS: 1  General: No distress, obese  Eyes:  anicteric sclerae  HENT: Atraumatic with normal appearance of ears and nose; OP clear  Neck: Supple; no thyromegaly   Respiratory:  normal respiratory effort  CV: Normal rate, regular rhythm, no murmurs, 2+ pitting edema in bilateral lower extremites  GI: Distended and mildly tender, but soft, no masses. Cannot appreciate HSM due to body habitus. MS: Digits without clubbing or cyanosis. Skin: No rashes, ecchymoses, or petechiae. Normal temperature, turgor, and texture. Neuro/Psych: Alert, oriented, appropriate affect, normal judgment/insight      Results:     Lab Results   Component Value Date/Time    WBC 4.3 03/28/2018 03:01 AM    HGB 7.7 (L) 03/28/2018 03:01 AM    HCT 25.0 (L) 03/28/2018 03:01 AM    PLATELET 42 (LL) 97/89/7781 03:01 AM    MCV 93.6 03/28/2018 03:01 AM    ABS.  NEUTROPHILS 1.6 (L) 03/28/2018 03:01 AM     Lab Results   Component Value Date/Time    Sodium 137 03/28/2018 03:01 AM    Potassium 3.9 03/28/2018 03:01 AM    Chloride 105 03/28/2018 03:01 AM    CO2 26 03/28/2018 03:01 AM    Glucose 109 (H) 03/28/2018 03:01 AM    BUN 17 03/28/2018 03:01 AM    Creatinine 0.95 03/28/2018 03:01 AM    GFR est AA >60 03/28/2018 03:01 AM    GFR est non-AA 58 (L) 03/28/2018 03:01 AM    Calcium 8.3 (L) 03/28/2018 03:01 AM     Lab Results   Component Value Date/Time    Bilirubin, total 2.8 (H) 03/26/2018 01:40 PM    ALT (SGPT) 33 03/26/2018 01:40 PM    AST (SGOT) 60 (H) 03/26/2018 01:40 PM    Alk. phosphatase 147 (H) 03/26/2018 01:40 PM    Protein, total 6.8 03/26/2018 01:40 PM    Albumin 2.3 (L) 03/26/2018 01:40 PM    Globulin 4.5 (H) 03/26/2018 01:40 PM     Lab Results   Component Value Date/Time    Reticulocyte count 3.2 (H) 03/26/2018 01:40 PM    Iron % saturation 12 (L) 03/26/2018 01:40 PM    TIBC 326 03/26/2018 01:40 PM    Ferritin 20 03/26/2018 01:40 PM    Vitamin B12 1215 (H) 03/26/2018 01:40 PM    Folate 12.7 03/26/2018 01:40 PM    Haptoglobin 16 (L) 03/26/2018 01:40 PM     (H) 03/26/2018 01:40 PM    Sed rate, automated 19 12/16/2017 06:31 PM    C-Reactive protein 3.18 (H) 12/16/2017 06:31 PM    Lipase 507 (H) 03/25/2018 01:24 PM    Hep C  virus Ab Interp. NONREACTIVE 12/18/2017 02:36 AM     Lab Results   Component Value Date/Time    INR 1.8 (H) 03/28/2018 03:01 AM    aPTT 33.0 (H) 12/16/2017 06:37 PM    Antithrombin Activity 50 (L) 12/16/2017 07:51 PM    Protein S, Total 38 (L) 12/16/2017 07:51 PM    Protein S, Free 65 12/16/2017 07:51 PM    Protein C Antigen 25 (L) 12/16/2017 07:51 PM     Lab Results   Component Value Date/Time     (H) 03/26/2018 01:40 PM     Lab Results   Component Value Date/Time    Reticulocyte count 3.2 (H) 03/26/2018 01:40 PM    Absolute Retic Cnt. 0.0822 (H) 03/26/2018 01:40 PM     Lab Results   Component Value Date/Time    Ferritin 20 03/26/2018 01:40 PM     Lab Results   Component Value Date/Time    Iron 39 03/26/2018 01:40 PM    TIBC 326 03/26/2018 01:40 PM    Iron % saturation 12 (L) 03/26/2018 01:40 PM    Ferritin 20 03/26/2018 01:40 PM     3/26/2018 XR CHEST  IMPRESSION  IMPRESSION: No evidence of acute cardiopulmonary process.       Assessment and Recommendations:   76 y/o female with hx of bladder cancer, ROBBINS cirrhosis and h/o esophageal varices admitted with worsening symptomatic anemia and abd pain. 1. Normocytic anemia (s/p 1 unit PRBCs)  Iron deficiency anemia: Initially suspected GI blood loss given sudden 3 gram drop in Hgb over the course of 2 weeks. However, retic count only 3.2% with RPI of 0.9, which is an inadequate response to this level of anemia. Iron profile is c/w iron deficiency and patient would benefit from parenteral iron. Received 1 dose of IV Fe this am (3/27). Retic count can be low in the setting of iron deficiency though. Further evaluation with bone marrow biopsy;  Viral etiology for worsening anemia less likely. -- GI evaluation;EGD and colonoscopy (3/28) today  -- Bone marrow biopsy (3/27); await results    Follow up appt made with Dr Irlanda Monahan to review BM bx results. Reviewed with pt and placed in discahrge summary. 2. Thrombocytopenia: 2/2 cirrhosis and splenic sequestration. Stable today  -- Continue to monitor   --Hold anticoagulation for plt ct < 50K    3. ROBBINS cirrhosis, esophageal varices: Patient requires regular monitoring and care from a Hepatologist such as surveillance EGDs with banding, use of Propranolol and more aggressive management of fluid overload. Pt used to be a part of the ROBBINS program at [facility], but she does not wish to go back to [facility]. She is scheduled to see Dr. Patria Lazaro at [facility] in 5/2018.  -- Scheduled to see hepatolgist at [facility] on May 4,  2018. 4. Abdominal pain: Unclear etiology. May be due to pressure from ascites. -- Consider abd imaging if continues  -- Recommend Prn  pain medication      Plan reviewed with Dr Irlanda Monahan.      Signed By: Leoncio Nguyen NP

## 2018-03-28 NOTE — PERIOP NOTES
Swati Tillman  1950  [id number]    Situation:  Verbal report received from: Gideon Neville RN  Procedure: Procedure(s):  ESOPHAGOGASTRODUODENOSCOPY (EGD)  COLONOSCOPY  ENDOSCOPIC POLYPECTOMY    Background:    Preoperative diagnosis: anemia  Postoperative diagnosis: Grade I Esophageal Varices    :  Dr. Donna Garza  Assistant(s): Endoscopy Technician-1: Darling Olson  Endoscopy RN-1: Danica Contreras RN    Specimens:   ID Type Source Tests Collected by Time Destination   1 : Rectum polyp Preservative Rectum  Gurjit Ace MD 3/28/2018 1655 Pathology     H. Pylori  no    Assessment:  Intra-procedure medications   Anesthesia gave intra-procedure sedation and medications, see anesthesia flow sheet yes    Intravenous fluids: NS@ KVO     Vital signs stable yes    Abdominal assessment: obese and firm    Recommendation:  Return to floor yes  Family or Friend yes  Permission to share finding with family or friend yes

## 2018-03-28 NOTE — PROGRESS NOTES
Bedside and Verbal shift change report given to Siddharth (oncoming nurse) by Doni Jalloh RN (offgoing nurse). Report included the following information SBAR, Kardex, Procedure Summary, Intake/Output, MAR, Accordion and Recent Results.

## 2018-03-28 NOTE — DISCHARGE INSTRUCTIONS
HOSPITALIST DISCHARGE INSTRUCTIONS  NAME: Ev Miller   :  1950   MRN:  [id number]     Date/Time:  3/28/2018 1:29 PM    ADMIT DATE: 3/26/2018     DISCHARGE DATE: 3/28/2018     ADMITTING DIAGNOSIS:  Pancytopenia     DISCHARGE DIAGNOSIS:  As above     MEDICATIONS:     · It is important that you take the medication exactly as they are prescribed. · Keep your medication in the bottles provided by the pharmacist and keep a list of the medication names, dosages, and times to be taken in your wallet. · Do not take other medications without consulting your doctor. Pain Management: per above medications    What to do at Home    Recommended diet:  Regular Diet    Recommended activity: Activity as tolerated    If you experience any of the following symptoms then please call your primary care physician or return to the emergency room if you cannot get hold of your doctor:  Fever, chills, nausea, vomiting, diarrhea, change in mentation, falling, bleeding, shortness of breath, chest pain     Follow Up:  Dr. Quiana Guzman PA  you are to call and set up an appointment to see them in 2 weeks. Information obtained by :  I understand that if any problems occur once I am at home I am to contact my physician. I understand and acknowledge receipt of the instructions indicated above. Physician's or R.N.'s Signature                                                                  Date/Time                                                                                                                                              Patient or Representative Signature                                                          Date/Time     Bone Marrow Aspiration and Biopsy: What to Expect at Home  Your Recovery  The biopsy site may feel sore for several days.  It can help to walk, take pain medicine, and put ice packs on the site. You will probably be able to return to work and your usual activities the day after the procedure. Your doctor or nurse will call you with the results of your test.  This care sheet gives you a general idea about how long it will take for you to recover. But each person recovers at a different pace. Follow the steps below to get better as quickly as possible. How can you care for yourself at home? Activity  ? · Rest when you feel tired. Getting enough sleep will help you recover. ? · You may drive when you are no longer taking pain pills and can quickly move your foot from the gas pedal to the brake. You must also be able to sit comfortably for a long period of time, even if you do not plan to go far. You might get caught in traffic. ? · Most people are able to return to work the day after the procedure. Medicines  ? · Your doctor will tell you if and when you can restart your medicines. He or she will also give you instructions about taking any new medicines. ? · If you take blood thinners, such as warfarin (Coumadin), clopidogrel (Plavix), or aspirin, be sure to talk to your doctor. He or she will tell you if and when to start taking those medicines again. Make sure that you understand exactly what your doctor wants you to do. ? · Be safe with medicines. Take pain medicines exactly as directed. ¨ If the doctor gave you a prescription medicine for pain, take it as prescribed. ¨ If you are not taking a prescription pain medicine, take an over-the-counter medicine such as acetaminophen (Tylenol), ibuprofen (Advil, Motrin), or naproxen (Aleve). Read and follow all instructions on the label. ¨ Do not take two or more pain medicines at the same time unless the doctor told you to. Many pain medicines have acetaminophen, which is Tylenol. Too much acetaminophen (Tylenol) can be harmful.    ? · If you think your pain medicine is making you sick to your stomach:  ¨ Take your medicine after meals (unless your doctor has told you not to). ¨ Ask your doctor for a different pain medicine. ? · If your doctor prescribed antibiotics, take them as directed. Do not stop taking them just because you feel better. ?Ice  ? · Put ice or a cold pack on the biopsy site for 10 to 20 minutes at a time. Put a thin cloth between the ice and your skin. Follow-up care is a key part of your treatment and safety. Be sure to make and go to all appointments, and call your doctor if you are having problems. It's also a good idea to know your test results and keep a list of the medicines you take. When should you call for help? Call 911 anytime you think you may need emergency care. For example, call if:  ? · You passed out (lost consciousness). ?Call your doctor now or seek immediate medical care if:  ? · You have signs of infection, such as:  ¨ Increased pain, swelling, warmth, or redness. ¨ Red streaks leading from the biopsy site. ¨ Pus draining from the biopsy site. ¨ Swollen lymph nodes in your neck, armpits, or groin. ¨ A fever. ? Watch closely for any changes in your health, and be sure to contact your doctor if:  ? · You are not getting better as expected. Where can you learn more? Go to http://sonny-mark.info/. Enter E148 in the search box to learn more about \"Bone Marrow Aspiration and Biopsy: What to Expect at Home. \"  Current as of: October 14, 2016  Content Version: 11.4  © 5068-6612 [employer]. Care instructions adapted under license by [employer] (which disclaims liability or warranty for this information). If you have questions about a medical condition or this instruction, always ask your healthcare professional. [address] any warranty or liability for your use of this information.

## 2018-03-28 NOTE — PROGRESS NOTES
Bedside shift change report given to [facility] SRN(oncoming nurse) by Janis Sandoval (offgoing nurse). Report included the following information SBAR and Kardex.

## 2018-03-28 NOTE — PROGRESS NOTES
TRANSFER - OUT REPORT:    Verbal report given to Lisa Balbuena RN on [place]  being transferred to [facility] Room 533 for routine progression of care       Report consisted of patients Situation, Background, Assessment and   Recommendations(SBAR). Information from the following report(s) SBAR was reviewed with the receiving nurse. Lines:   Peripheral IV 03/26/18 Right Hand (Active)   Site Assessment Clean, dry, & intact 3/28/2018  1:16 PM   Phlebitis Assessment 0 3/28/2018  1:16 PM   Infiltration Assessment 0 3/28/2018  1:16 PM   Dressing Status Clean, dry, & intact 3/28/2018  1:16 PM   Dressing Type Transparent 3/28/2018  1:16 PM   Hub Color/Line Status Pink; Infusing 3/28/2018  1:16 PM   Action Taken Open ports on tubing capped 3/27/2018 10:19 PM   Alcohol Cap Used Yes 3/28/2018  9:00 AM       Peripheral IV 03/26/18 Left Antecubital (Active)   Site Assessment Clean, dry, & intact 3/28/2018  3:33 PM   Phlebitis Assessment 0 3/28/2018  3:33 PM   Infiltration Assessment 0 3/28/2018  3:33 PM   Dressing Status Clean, dry, & intact 3/28/2018  3:33 PM   Dressing Type Tape;Transparent 3/28/2018  3:33 PM   Hub Color/Line Status Pink 3/28/2018  3:33 PM   Action Taken Open ports on tubing capped 3/28/2018  3:33 PM   Alcohol Cap Used Yes 3/28/2018  3:33 PM        Opportunity for questions and clarification was provided. Patient transported with:  Transport staff.

## 2018-03-28 NOTE — PROGRESS NOTES
Occupational therapy note:  Orders received, chart reviewed. Patient has been managing toileting tasks in room, and does not require physical assistance for ambulation but supervision for safety with IV lines. Patient does not require full OT evaluation at this time. Will complete current orders. Sasha Hanna MS OTR/L

## 2018-03-28 NOTE — PROCEDURES
[address]  [address] [facility], [id number] [place] [address]       [phone]                   3/28/2018                EGD Operative Report  Michael Jolley  :  1950  [facility] [place] Medical Record Number:  [id number]      Indication:  Iron deficiency anemia     : Evelia Mccrary MD    Referring Provider:  BRISEYDA Combs      Anesthesia/Sedation:  MAC anesthesia Propofol    Airway assessment: No airway problems anticipated    Pre-Procedural Exam:      Airway: clear, no airway problems anticipated  Heart: RRR, without gallops or rubs  Lungs: clear bilaterally without wheezes, crackles, or rhonchi  Abdomen: soft, nontender, nondistended, bowel sounds present  Mental Status: awake, alert and oriented to person, place and time       Procedure Details     After infomed consent was obtained for the procedure, with all risks and benefits of procedure explained the patient was taken to the endoscopy suite and placed in the left lateral decubitus position. Following sequential administration of sedation as per above, the endoscope was inserted into the mouth and advanced under direct vision to second portion of the duodenum. A careful inspection was made as the gastroscope was withdrawn, including a retroflexed view of the proximal stomach; findings and interventions are described below. Findings:   Esophagus: One Grade 1 varix column noted. No stigmata of recent or active bleeding noted. Stomach: No gastric varices or portal gastropathy noted. Small sliding hiatal hernia noted. Otherwise normal mucosa  Duodenum/jejunum: normal    Therapies:  none    Specimens: none           Complications:   None; patient tolerated the procedure well. EBL:  None. Impression:    Grade I Esophageal Varix       Recommendations:    -Continue acid suppression. ,   -GERD diet: avoid fried and fatty foods.  peppermint, chocolate, alcohol, coffee, citrus fruits and juices, tomoato products; avoid lying down for 2 to 3 hours after eating.  -Colonoscopy today    Gena Dempsey MD

## 2018-03-30 NOTE — TELEPHONE ENCOUNTER
BRISEYDA Molina  At [facility] had called re: Change in pt's condition. Pt was discharged on Wednesday 3/28/18 from [facility]. He reports that yesterday she experienced a GLF hitting her head. He reports her head CT was negative, however found to have right  humerus fracture. She was also found to have Left pleural effusion. [place] then asked if we had a specific pulmonologist we work with and who is within the health system. I recommended Dr. Brenden Ramesh for patient. He also reports she has a follow up with ortho later this week. He states she is currently on lasix and that he will send of H&H recheck later next week.

## 2018-04-11 NOTE — DISCHARGE SUMMARY
Physician Discharge Summary     Patient ID:  Alanna Stoddard  [id number]  77 y.o.  1950    Admit date: 3/26/2018    Discharge date and time: 3/28/2018     Admission Diagnoses: Anemia     Discharge Diagnoses/Hospital Course   Ms. Cheron Boeck is a 78 yo female with PMH of ROBBINS cirrhosis admitted with anemia. She was given PRBC's and IV iron during her stay and underwent a bone marrow biopsy for which her pathology is pending. She also underwent GI eval and EGD/colonoscopy showing one grade 1 varix (no e/o bleeding) and no gastric varices or portal gastropathy. No e/o active bleeding. Colonoscopy showed a grade 1 internal and external hemorrhoid as well as diverticulosis. No active bleeding. With regards to her cirrhosis, she is now on lasix and spironolactone. She has hepatology follow-up in May.     PCP: Daryle Kennel, PA     Consults: GI and Hematology/Oncology    Discharge Exam:  Visit Vitals    /48 (BP 1 Location: Left arm, BP Patient Position: At rest)    Pulse 74    Temp 98 °F (36.7 °C)    Resp 18    Ht 5' 4\" (1.626 m)    Wt 125 kg (275 lb 9.2 oz)    SpO2 94%    BMI 47.3 kg/m2     Gen:  Well-developed, well-nourished, in no acute distress  HEENT:  Pink conjunctivae, PERRL, hearing intact to voice, moist mucous membranes  Neck:  Supple, without masses, thyroid non-tender  Resp:  No accessory muscle use, clear breath sounds without wheezes rales or rhonchi  Card:  No murmurs, normal S1, S2 without thrills, bruits or peripheral edema  Abd:  Soft, non-tender, non-distended, normoactive bowel sounds are present  Musc:  No cyanosis or clubbing  Skin:  No rashes or ulcers, skin turgor is good  Neuro:  Cranial nerves 3-12 are grossly intact,  strength is 5/5 bilaterally and dorsi / plantarflexion is 5/5 bilaterally, follows commands appropriately  Psych:  Good insight, oriented to person, place and time, alert    Disposition: home    Patient Instructions:   Discharge Medication List as of 3/28/2018 6:11 PM      START taking these medications    Details   spironolactone (ALDACTONE) 25 mg tablet Take 1 Tab by mouth daily. , Print, Disp-30 Tab, R-0         CONTINUE these medications which have NOT CHANGED    Details   ondansetron (ZOFRAN ODT) 4 mg disintegrating tablet Take 4 mg by mouth every eight (8) hours as needed for Nausea., Historical Med      furosemide (LASIX) 80 mg tablet Take 40 mg by mouth two (2) times a day., Historical Med      albuterol (PROVENTIL VENTOLIN) 2.5 mg /3 mL (0.083 %) nebulizer solution 2.5 mg by Nebulization route every four (4) hours as needed for Wheezing., Historical Med      omeprazole (PRILOSEC) 20 mg capsule Take 20 mg by mouth daily. , Historical Med         STOP taking these medications       naproxen sodium (NAPROSYN) 220 mg tablet Comments:   Reason for Stopping:         metoprolol (LOPRESSOR) 25 mg tablet Comments:   Reason for Stopping:               Activity: Activity as tolerated  Diet: Resume previous diet  Wound Care: None needed    Follow-up Information     Follow up With Details Comments Wei Cruz MD Go on 4/12/2018 appt at 2 pm  [address] [facility] [address]  [phone]      [facility],[address]  [phone]            Approximate time spent in patient care on day of discharge: 36 minutes     Signed:  Chantal Franklin MD  4/10/2018  8:08 PM

## 2018-04-12 NOTE — MR AVS SNAPSHOT
Roscoe Robin 
 
 
 [address] [facility], [address] 
[phone] Patient: Lalo Schmidt MRN: [id number] UWR:[id number] Visit Information Date & Time Provider Department Dept. Phone Encounter #  
 4/12/2018  2:00 PM MD Yamilka Gibson Oncology at [address] [place] [address] [id number] Follow-up Instructions Return in about 10 weeks (around 6/21/2018) for Anemia f/u, Thorn 10 week follow up. Your Appointments 4/20/2018 11:05 AM  
ROUTINE CARE with Sonia Tapia MD  
Formerly [facility], [facility] ([facility]) Appt Note: NP. cirrhosis . referred by BRISEYDA Suh [phone] CP$ sth 12/19/17; Katya GASCA from [facility] r/s NP. cirrhosis . referred by BRISEYDA Suh [phone] CP$ sth 12/19/17; r/s from 04/13, AB, 05/04/18; r/s [address] [phone] [address] [place] [phone]  
  
   
 [place] [address]  
  
    
 6/18/2018 10:30 AM  
ESTABLISHED PATIENT with MD Yamilka Gibson Oncology at [facility]) Appt Note: Anemia f/u, Thorn 10 week follow up.  
 [address] [facility], [address] [place] [id number]  
[phone]  
  
   
 [address] [place] [address], [address] [place] [address] Upcoming Health Maintenance Date Due DTaP/Tdap/Td series (1 - Tdap) 1/23/1969 ZOSTER VACCINE AGE 60> 11/23/2007 GLAUCOMA SCREENING Q2Y 1/23/2013 Pneumococcal 65+ High/Highest Risk (1 of 2 - PCV13) 1/23/2013 MEDICARE YEARLY EXAM 3/26/2018 FOBT Q 1 YEAR AGE 50-75 3/27/2019 BREAST CANCER SCRN MAMMOGRAM 11/17/2019 Allergies as of 4/12/2018  Review Complete On: 4/12/2018 By: Nathaly Jarquin RN No Known Allergies Current Immunizations  Reviewed on 1/26/2018 Name Date Influenza High Dose Vaccine PF 1/26/2018 11:28 AM  
  
 Not reviewed this visit You Were Diagnosed With   
  
 Codes Comments Iron deficiency anemia, unspecified iron deficiency anemia type    -  Primary ICD-10-CM: D50.9 ICD-9-CM: 280.9 Vitals BP Pulse Temp Height(growth percentile) Weight(growth percentile) 135/74 (BP 1 Location: Left arm, BP Patient Position: Sitting) 91 96.4 °F (35.8 °C) (Temporal) 5' 4\" (1.626 m) 267 lb 12.8 oz (121.5 kg) SpO2 BMI OB Status Smoking Status 96% 45.97 kg/m2 Postmenopausal Never Smoker BMI and BSA Data Body Mass Index Body Surface Area 45.97 kg/m 2 2.34 m 2 Preferred Pharmacy Pharmacy Name Phone [address], [address] [facility]. [phone] Your Updated Medication List  
  
   
This list is accurate as of 4/12/18  2:37 PM.  Always use your most recent med list.  
  
  
  
  
 albuterol 2.5 mg /3 mL (0.083 %) nebulizer solution Commonly known as:  PROVENTIL VENTOLIN  
2.5 mg by Nebulization route every four (4) hours as needed for Wheezing. LASIX 80 mg tablet Generic drug:  furosemide Take 40 mg by mouth two (2) times a day. omeprazole 20 mg capsule Commonly known as:  PRILOSEC Take 20 mg by mouth daily. spironolactone 25 mg tablet Commonly known as:  ALDACTONE Take 1 Tab by mouth daily. ZOFRAN ODT 4 mg disintegrating tablet Generic drug:  ondansetron Take 4 mg by mouth every eight (8) hours as needed for Nausea. Follow-up Instructions Return in about 10 weeks (around 6/21/2018) for Anemia f/uShin 10 week follow up. To-Do List   
 06/05/2018 Lab:  CBC WITH AUTOMATED DIFF   
  
 06/05/2018 Lab:  FERRITIN   
  
 06/05/2018 Lab:  IRON PROFILE Introducing [facility]! Dear Lulú Bardales: Thank you for requesting a [employer] account. Our records indicate that you already have an active Expert[employer] account. You can access your account anytime at [email or web address] "[email or web address][email or web address] Did you know that you can access your hospital and ER discharge instructions at any time in [employer]? You can also review all of your test results from your hospital stay or ER visit. Additional Information If you have questions, please visit the Frequently Asked Questions section of the [employer] website at [email or web address] Remember, [employer] is NOT to be used for urgent needs. For medical emergencies, dial 911. Now available from your iPhone and Android! Please provide this summary of care documentation to your next provider. Your primary care clinician is listed as Jannet Dunham. If you have any questions after today's visit, please call [phone].

## 2018-04-12 NOTE — PROGRESS NOTES
[address] [facility] Oncology at [facility]  [phone]    Hematology / Oncology Progress Note    Reason for Visit:   Edmond Rios is a 77 y.o. female who comes in for follow up of anemia. Pathology:     Bone marrow biopsy 3/27/18:   Variably normocellular marrow with trilineage hematopoiesis. See comment. The bone marrow is variably normocellular for age to reveal trilineage hematopoiesis with adequate maturation. Megakaryocytes are normal in number and have a spectrum of maturation. No significant dysplasia is identified in any cell line. Blasts are not increased. Mild increase in polytypic plasma cells (approximately 5%) is observed. 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 In summary, there is no morphologic evidence of myelodysplastic syndrome. Cytogenetics and FISH analysis for MDS panel is pending and the results will be issued as an addendum. History of Present Illness:   Ms. Mandi Mckinney is a 76 y/o female with h/o bladder cancer, ROBBINS cirrhosis, h/o LLE DVT who comes in for follow up of anemia. I first met her in the hospital when she was admitted for cellulitis and DVT. No prior personal h/o or family h/o thrombosis. Regarding ROBBINS cirrhosis, the patient states she was diagnosed several years ago after having an episode of hematemesis, was found to have esophageal varices. At that time, she had also seen a Hematologist in [place] for workup of thrombocytopenia. Patient denies ever having banding for the varices or medication with Propranolol. Labs here reveal PLT range from 44 - 57. She endorses easy bruising, bleeding, but denies current/recent epistaxis, gingival bleeding. No melena/hematochezia, hematuria. After hospital discharge, she completed another 5 days of oral antibiotics. Her LLE erythema and swelling had improved greatly.  She also underwent repeat testing of the LLE in the hospital on 12/20/17 which was negative for DVT. She was then admitted to the hospital in 3/2018 after p/w SOB, GOLDMAN, fatigue, lower abdominal pain, new symptomatic anemia. On 3/25/18, her labs showed Hgb 7.7, stool hemoccult negative. The patient's primary care PA (who is also her son-in-law) called stating that patient's Hgb was > 10 only weeks ago, and that patient's GOLDMAN, SOB were all quite new with patient having trouble walking across the room without significant shortness of breath. Therefore, she was admitted. Labs in ED notable for WBC 3.3 with ANC 1.5, Hgb 7.5, PLT 67. The patient and her daughter report that she was seen by primary care 2 weeks ago given increased abdominal distention and pain. Reportedly a CXR showed possible pulmonary edema. The Lasix dose was increased from 40 to 80mg with some improvement in symptoms per patient. No melena/hematochezia/hematemesis. During this admission, patient was treated with parenteral iron and underwent bone marrow biopsy. She was discharged on both Spironolactone and Lasix. Report appt with Dr. Nga Sousa on 5/4/2018. Today, she comes in for follow up of the anemia and review of bone marrow biopsy results. Since the last visit and hospital discharge, she accidentally fell and broke her right humerus and is currently wearing a sling. She also has a small left pleural effusion. Labs on 4/3/18 show: WBC 4.3, Hgb 8.7, MCV 96, PLT 70, AST 59 H, ALT 25, , total bili 3.1, direct bili 1.4, Cr 1.1. The patient and her daughter state that her prior abdominal tightness and LE edema are improving ever since starting on Spironolactone. Review of weights shows a 10-kg weight gain between Jan and March 2018, but recent 4-5 kg weight loss - all likely due to fluid.     Past Medical History:   Diagnosis Date    Anemia     GERD (gastroesophageal reflux disease)     H/O bone density study 10/28/2016    Osteopenia    Hypermenorrhea     Hypertension     Leiomyoma of uterus, unspecified  Liver cirrhosis secondary to ROBBINS ([facility]     Menopausal syndrome     Osteopenia 2016    Thrombocytopenia (HCC)     Transitional cell bladder cancer ([facility]     Has been cleared by urology      Past Surgical History:   Procedure Laterality Date    COLONOSCOPY N/A 3/28/2018    COLONOSCOPY performed by Georges Hernandez [facility] at [facility]  3/28/2018         HX HYSTEROSCOPY  03/1996    w/ D&C    HX OTHER SURGICAL  02/19/10    tumor in bladder removed x2 in 2010    HX OTHER SURGICAL  03/25/10    Liver Biopsy--Cirrhosis    HX TUBAL LIGATION      HX UROLOGICAL      tumor removed from bladder    UPPER GI ENDOSCOPY,DIAGNOSIS  3/28/2018           Social History   Substance Use Topics    Smoking status: Never Smoker    Smokeless tobacco: Never Used    Alcohol use No      Family History   Problem Relation Age of Onset    Diabetes Brother      Type II    Hypertension Brother     Heart Disease Father     Heart Attack Father 64    Hypertension Father     Hypertension Mother     Osteoporosis Mother      +Hip fracture    Hypertension Sister      Current Outpatient Prescriptions   Medication Sig    spironolactone (ALDACTONE) 25 mg tablet Take 1 Tab by mouth daily.  ondansetron (ZOFRAN ODT) 4 mg disintegrating tablet Take 4 mg by mouth every eight (8) hours as needed for Nausea.  furosemide (LASIX) 80 mg tablet Take 40 mg by mouth two (2) times a day.  albuterol (PROVENTIL VENTOLIN) 2.5 mg /3 mL (0.083 %) nebulizer solution 2.5 mg by Nebulization route every four (4) hours as needed for Wheezing.  omeprazole (PRILOSEC) 20 mg capsule Take 20 mg by mouth daily. No current facility-administered medications for this visit. No Known Allergies     Review of Systems: A complete review of systems was obtained, negative except as described above.     Physical Exam:     Visit Vitals    /74 (BP 1 Location: Left arm, BP Patient Position: Sitting)    Pulse 91    Temp 96.4 °F (35.8 °C) (Temporal)    Ht 5' 4\" (1.626 m)    Wt 267 lb 12.8 oz (121.5 kg)    SpO2 96%    BMI 45.97 kg/m2     ECOG PS: 1  General: No distress, obese  Eyes: PERRLA, anicteric sclerae  HENT: Atraumatic with normal appearance of ears and nose; OP clear  Neck: Supple; no thyromegaly   Lymphatic: No cervical, supraclavicular, or inguinal adenopathy  Respiratory: CTAB, normal respiratory effort  CV: Normal rate, regular rhythm, no murmurs. 2+ pitting edema in BLE. GI: Soft, nontender, nondistended, no masses, no hepatomegaly, no splenomegaly  MS: Normal gait and station. Digits without clubbing or cyanosis. Right arm with splint and in sling. Skin: No ecchymoses, or petechiae. Normal temperature, turgor, and texture. Neuro/Psych: Alert, oriented, appropriate affect, normal judgment/insight       Results:     Lab Results   Component Value Date/Time    WBC 4.3 03/28/2018 03:01 AM    HGB 7.7 (L) 03/28/2018 03:01 AM    HCT 25.0 (L) 03/28/2018 03:01 AM    PLATELET 42 (LL) 98/32/5006 03:01 AM    MCV 93.6 03/28/2018 03:01 AM    ABS. NEUTROPHILS 1.6 (L) 03/28/2018 03:01 AM     Lab Results   Component Value Date/Time    Sodium 137 03/28/2018 03:01 AM    Potassium 3.9 03/28/2018 03:01 AM    Chloride 105 03/28/2018 03:01 AM    CO2 26 03/28/2018 03:01 AM    Glucose 109 (H) 03/28/2018 03:01 AM    BUN 17 03/28/2018 03:01 AM    Creatinine 0.95 03/28/2018 03:01 AM    GFR est AA >60 03/28/2018 03:01 AM    GFR est non-AA 58 (L) 03/28/2018 03:01 AM    Calcium 8.3 (L) 03/28/2018 03:01 AM     Lab Results   Component Value Date/Time    Bilirubin, total 2.8 (H) 03/26/2018 01:40 PM    ALT (SGPT) 33 03/26/2018 01:40 PM    AST (SGOT) 60 (H) 03/26/2018 01:40 PM    Alk.  phosphatase 147 (H) 03/26/2018 01:40 PM    Protein, total 6.8 03/26/2018 01:40 PM    Albumin 2.3 (L) 03/26/2018 01:40 PM    Globulin 4.5 (H) 03/26/2018 01:40 PM         Imaging:     LLE doppler 12/20/17:  CONCLUSION: Left lower extremity venous duplex negative for deep  venous thrombosis or thrombophlebitis in the veins visualized. Prominent lymph node noted in the left groin measuring 2.97 x 1.44 cm. Right common femoral vein is thrombus free. Procedures:  EGD 3/27/18:   Impression:    Grade I Esophageal Varix     Recommendations: -Continue acid suppression. ,  -GERD diet: avoid fried and fatty foods. peppermint, chocolate, alcohol, coffee, citrus fruits and juices, tomoato products; avoid lying down for 2 to 3 hours after eating. -Colonoscopy today    Colonoscopy 3/27/18: Findings:   Rectum: Grade 1 internal and external hemorrhoid(s); Sigmoid:     - Diverticulosis, Food residue and stool noted  Descending Colon:     - Diverticulosis  Transverse Colon: normal- stool present  Ascending Colon: normal, Food residue and stool noted  Cecum: normal  Terminal Ileum: normal  Interventions:  1 complete polypectomy were performed using cold biopsy forceps and the polyps was retrieved (tubular adenoma on path)    Assessment & Plan:   Alicia Muniz is a 69 y.o. female with ROBBINS cirrhosis, esophageal varices now with worsening anemia, thrombocytopenia.      1. Normocytic anemia: Multifactorial due to splenic sequestration and some component of iron deficiency. 3 gram drop in Hgb over the course of 2 weeks with inadequate retic response. Iron profile is c/w iron deficiency and patient received 1 dose of iron sucrose as well as transfusion of 1 Unit in 3/2018. Bone marrow biopsy normal and shows adequate trilineage hematopoiesis. GI evaluation with EGD identified Grade I esophageal varix. Colonoscopy showed a tubular adenoma. -- Injectafer x 1  -- Repeat CBC and iron profile in 8 weeks.     2. Thrombocytopenia: 2/2 cirrhosis and splenic sequestration. -- Continue to monitor   --Hold anticoagulation for plt ct < 50K     3.  ROBBINS cirrhosis, esophageal varices: Patient requires regular monitoring and care from a Hepatologist such as surveillance EGDs with banding, use of Propranolol and more aggressive management of fluid overload. Pt used to be a part of the ROBBINS program at [facility], but she does not wish to go back to [facility]. She is scheduled to see Dr. Cresencio Rosa at [facility] in 4/20/2018. Her weight and overall fluid status had worsened between Jan and March 2018, but starting to improve with Spironolactone. -- Now on spironolactone and Lasix. -- Scheduled to see hepatologist at [facility] on April 20, 2018.      4. H/o LLE DVT: Resolved spontaneously without any anticoagulation. Patients with cirrhosis have concomitant risks of bleeding and clotting. I do not feel hypercoag workup is warranted in this patient who has her first lifetime DVT at age 69 since testing will be low yield and results will likely not . Given patient's PLT count less than 50 and presence of esophageal varices, I feel that the risks of anticoagulation outweigh the benefits. I do recommend supportive care with elevation and gentle mobilization. Regarding IVC filter, I do not recommend placement in this patient since she has DVT limited to peroneal vein rather than extensive DVT. IVC filters are recommended in patients who require but have contraindications to anticoagulation, with evidence of extensive clot burden which are at risk for fatal PE.   -- No anticoagulation recommended. -- Follow up as needed. 5. Right humerus fracture: Will stay in splint and sling for 8 weeks with serial xray imaging to monitor healing. If not healing properly, she may need surgery.       I appreciate the opportunity to participate in Ms. Javy Mckeon's care.     Signed By: Sylvain Ruiz MD     April 12, 2018

## 2018-04-20 NOTE — PROGRESS NOTES
[address] Chivo Patterson MD, FACP, [facility], [place]       EDUARD Alves PA-C Alveda Spikes, [facility]   EDUARD Ghosh NP [address]    at [facility]    [address] [facility], [address] [facility] [place]  22.    [phone]    FAX: [facility]    at [facility], [address] [place] [address], [address]    [phone]    FAX: [phone]         Patient Care Team:  Lindsey Isaac as PCP - Susi Huizar MD as Physician (Obstetrics & Gynecology)        Problem List  Date Reviewed: 4/20/2018          Codes Class Noted    ROBBINS (nonalcoholic steatohepatitis) ICD-10-CM: K75.81  ICD-9-CM: 571.8  5/13/2018        Anemia ICD-10-CM: D64.9  ICD-9-CM: 285.9  3/26/2018        Hypertension ICD-10-CM: I10  ICD-9-CM: 401.9  Unknown        Leukopenia ICD-10-CM: D72.819  ICD-9-CM: 288.50  3/26/2018        GERD (gastroesophageal reflux disease) ICD-10-CM: K21.9  ICD-9-CM: 530.81  Unknown        Thrombocytopenia ([facility] ICD-10-CM: D69.6  ICD-9-CM: 287.5  12/16/2017        DVT (deep venous thrombosis) ([facility] ICD-10-CM: I82.409  ICD-9-CM: 453.40  12/16/2017        Cirrhosis ([facility] ICD-10-CM: K74.60  ICD-9-CM: 571.5  12/16/2017        Esophageal varices ([facility] ICD-10-CM: I85.00  ICD-9-CM: 456.1  12/16/2017        Obesity, morbid ([facility] ICD-10-CM: E66.01  ICD-9-CM: 278.01  12/8/2017        Osteopenia ICD-10-CM: M85.80  ICD-9-CM: 733.90  1/1/2016        Menopausal syndrome ICD-10-CM: N95.1  ICD-9-CM: 627.2  Unknown                The clinicians listed above have asked me to see Lalo Schmidt in consultation regarding cirrhosis secondary to ROBBINS. All medical records sent by the referring physicians were reviewed. The patient is a 77 y.o.   female who was found to have cirrhosis and ROBBINS on liver biopsy in 2010. Serologic evaluation for causes of chronic liver disease were negative. The most recent imaging of the liver was MRI performed in 2012. Results suggest cirrhosis. No liver mass lesions noted. An ECHO from 11/2017 shows mild pulmonary HTN, RV dilation and TR. The patient has not developed any major complications of cirrhosis to date. The most recent laboratory studies indicate the AST is normal, ALT is elevated, ALP is elevated, tests of hepatic synthetic and metabolic function are abnormal, total bilirubin is elevated, INR is elevated, albumin is depressed   and the platelet count is depressed. The patient notes fatigue, swelling of the lower extremities,     The patient has not experienced pain in the right side over the liver, problems concentrating, swelling of the abdomen, hematemesis, hematochezia. The patient has Mild limitations in functional activities which can be attributed to the liver disease and to other medical problems that are not related to the liver disease. ALLERGIES  No Known Allergies    MEDICATIONS  Current Outpatient Prescriptions   Medication Sig    spironolactone (ALDACTONE) 100 mg tablet Take 1 Tab by mouth daily. Indications: Edema    furosemide (LASIX) 40 mg tablet Take 1 Tab by mouth daily. Indications: Edema    ondansetron (ZOFRAN ODT) 4 mg disintegrating tablet Take 4 mg by mouth every eight (8) hours as needed for Nausea.  albuterol (PROVENTIL VENTOLIN) 2.5 mg /3 mL (0.083 %) nebulizer solution 2.5 mg by Nebulization route every four (4) hours as needed for Wheezing.  omeprazole (PRILOSEC) 20 mg capsule Take 20 mg by mouth daily. No current facility-administered medications for this visit. SYSTEM REVIEW NOT RELATED TO LIVER DISEASE OR REVIEWED ABOVE:  Constitution systems: + weight loss (fluid), Negative for fever, chills, weight gain.    Eyes: Negative for visual changes. ENT: Negative for sore throat, painful swallowing. Respiratory: Negative for cough, hemoptysis, SOB. Cardiology: Negative for chest pain, palpitations. GI:  Negative for constipation or diarrhea. : Negative for urinary frequency, dysuria, hematuria, nocturia. Skin: Negative for rash. Hematology: Negative for easy bruising, blood clots. Musculo-skeletal: Recent right humerus fracture - still causing pain. Negative for back pain, muscle pain, weakness. Neurologic: Negative for headaches, dizziness, vertigo, memory problems not related to HE. Psychology: Negative for anxiety, depression. FAMILY HISTORY:  There is no family history of liver disease. SOCIAL HISTORY:  The patient is . The patient has 4 children and 10 grandchildren. The patient has never used tobacco products. The patient has never consumed significant amounts of alcohol. 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 PHYSICAL EXAMINATION:  Visit Vitals    /70 (BP 1 Location: Left arm, BP Patient Position: Sitting)    Pulse 100    Temp 97.1 °F (36.2 °C) (Tympanic)    Ht 5' 4\" (1.626 m)    Wt 254 lb 9.6 oz (115.5 kg)    SpO2 97%    BMI 43.7 kg/m2     General: No acute distress. Obese. Eyes: Sclera anicteric. ENT: No oral lesions. Nodes: No adenopathy. Skin: No spider angiomata. No jaundice. No palmar erythema. Respiratory: Lungs clear to auscultation. Cardiovascular: Regular heart rate. No murmurs. No JVD. Abdomen: Soft non-tender. Liver size normal to percussion/palpation. Spleen not palpable. No obvious ascites. Extremities: +3 bilateral pitting edema up to mid tibia. Feet are 4+. No muscle wasting. No gross arthritic changes. Sling on RUE. Neurologic: Alert and oriented. Cranial nerves grossly intact. No asterixis.     LABORATORY STUDIES:  Liver [place] of [address] & Units 4/20/2018   WBC 3.4 - 10.8 x10E3/uL 3.9   ANC 1.8 - 8.0 K/UL HGB 11.1 - 15.9 g/dL 10.9 (L)    - 379 x10E3/uL 85 (LL)   INR 0.8 - 1.2 1.5 (H)   AST 0 - 40 IU/L 54 (H)   ALT 0 - 32 IU/L 20   Alk Phos 39 - 117 IU/L 177 (H)   Bili, Total 0.0 - 1.2 mg/dL 2.3 (H)   Bili, Direct 0.00 - 0.40 mg/dL 0.85 (H)   Albumin 3.5 - 4.8 g/dL 2.7 (L)   BUN 8 - 27 mg/dL 11   Creat 0.57 - 1.00 mg/dL 0.78   Na 134 - 144 mmol/L 140   K 3.5 - 5.2 mmol/L 3.8   Cl 96 - 106 mmol/L 102   CO2 18 - 29 mmol/L 27   Glucose 65 - 99 mg/dL 93   Magnesium 1.6 - 2.4 mg/dL    Ammonia <32 UMOL/L      SEROLOGIES:  Serologies Latest Ref Rng & Units 2018 3/26/2018   Ferritin 8 - 252 NG/ML  20   Iron % Saturation 20 - 50 %  12 (L)   GREG Ab, Direct Negative Negative    C-ANCA Neg:<1:20 titer <1:20    P-ANCA Neg:<1:20 titer <1:20    ANCA Neg:<1:20 titer <1:20    ASMCA 0 - 19 Units 17    M2 Ab 0.0 - 20.0 Units 6.1    Alpha-1 antitrypsin level 90 - 200 mg/dL 128      Serologies Latest Ref Rng & Units 2017   Hep B Surface Ag Index <0.10   Hep B Surface Ag Interp NEG   NEGATIVE   Hep B Surface Ab mIU/mL <3.10   Hep B Surface Ab Interp  NONREACTIVE   Hep C Ab NR   NONREACTIVE     LIVER HISTOLOGY:  2010. Reviewed at [facility]. ROBBINS with cirrhosis. ENDOSCOPIC PROCEDURES:  2012. EGD performed by Dr James Pinedo. Small esophageal varices. No banding performed. Mild portal gastropathy. RADIOLOGY:  2012. MRI scan abdomen. Changes consistent with cirrhosis. No liver mass lesions. No dilated bile ducts. No bile duct strictures. No ascites. OTHER TESTIN2017. ECHO heart. Norml LVEF 55-65%, LA dilated, RV dilated, TR. PAP 44 mmHg. ASSESSMENT AND PLAN:  Cirrhosis secondary to ROBBINS. Have ordered hepatic panel, BMP, INR, CBC with platelet count to assess liver function and recalculate MELD score.     The most recent laboratory studies indicate that the AST is elevated, ALT is normal, alkaline phosphatase is elevated, total bilirubin is elevated, albumin is depressed, and the platelet count is depressed. The patient is not a candidate for liver transplantation because of pulmonary hypertension and TR. The patient has never developed any complications of cirrhosis to date. The CTP is 7. Child class B. The MELD score is 9. Lower extremity edema is refractory to current doses of diuretics. This is probably secondary to cardiopulmonary disease and TR. Esophageal varicies are small and without prior bleeding in 1202. Will schedule for repeat EGD to assess for varices and need for banding. Hepatic encephalopathy  has not developed to date. There is no need for treatment with lactulose and/or xifaxan at this time. No need to restrict dietary protein at this time. The patient was directed to continue all current medications at the current dosages. There are no contraindications for the patient to take any medications that are necessary for treatment of other medical issues. The patient was counseled regarding alcohol consumption. Thrombocytopenia secondary to cirrhosis. There is no evidence of overt bleeding. Neutropenia secondary to cirrhosis. There is no evidence of infection. There is no indication for GCSF at this time. Anemia from chronic GI blood loss from portal hypertension and iron deficiency. Will administer intravenous iron. The risk of osteoporosis is increased in patients with cirrhosis. DEXA bone density to assess for osteoporosis demonstrated osteopenia. The need for vaccination against viral hepatitis A will be assessed with serologic and instituted as appropriate. [facility] screening will be performed. AFP was ordered today. Ultrasound will be scheduled for prior to or the same day as the next office visit. All of the above issues were discussed with the patient. All questions were answered. The patient expressed a clear understanding of the above. [facility] 87 in 4 weeks. Moses Zayas MD  Liver [place] of [address] [facility]  [place]  [address].  [phone]

## 2018-04-20 NOTE — MR AVS SNAPSHOT
2700 [facility] Ag [id number] [address] [place] [address] 
[phone] Patient: Gt Ashford MRN: [id number] EMP:[birth year] Visit Information Date & Time Provider Department Dept. Phone Encounter #  
 4/20/2018 11:05 AM Brando Jewellorly 47 of [facility] [id number] Follow-up Instructions Return in about 4 weeks (around 5/18/2018) for Olegario Renteria. Your Appointments 5/23/2018 11:15 AM  
Follow Up with EDUARD Perez 75 ([facility] Appt Note: 4 week f/u per erikajimenez [address] [place] [address] Ag [id number] [address] [place] [address]  
[phone]  
  
   
 [place] [address]  
  
    
 6/18/2018 10:30 AM  
[facility] [address] [place] [address]) Appt Note: Anemia f/u, Thorn 10 week follow up.  
 [address] [address]  
[phone]  
  
   
 [address] [place] [address] Upcoming Health Maintenance Date Due DTaP/Tdap/Td series (1 - Tdap) 1/23/1969 ZOSTER VACCINE AGE 60> 11/23/2007 GLAUCOMA SCREENING Q2Y 1/23/2013 Pneumococcal 65+ High/Highest Risk (1 of 2 - PCV13) 1/23/2013 MEDICARE YEARLY EXAM 3/26/2018 FOBT Q 1 YEAR AGE 50-75 3/27/2019 BREAST CANCER SCRN MAMMOGRAM 11/17/2019 Allergies as of 4/20/2018  Review Complete On: 4/20/2018 By: Jhony Mead LPN No Known Allergies Current Immunizations  Reviewed on 1/26/2018 Name Date Influenza High Dose Vaccine PF 1/26/2018 11:28 AM  
  
 Not reviewed this visit You Were Diagnosed With   
  
 Codes Comments Liver cirrhosis secondary to ROBBINS ([facility]    -  Primary ICD-10-CM: K75.81, K74.60 ICD-9-CM: 571.8, 571.5 Vitals BP Pulse Temp Height(growth percentile) Weight(growth percentile) 138/70 (BP 1 Location: Left arm, BP Patient Position: Sitting) 100 97.1 °F (36.2 °C) (Tympanic) 5' 4\" (1.626 m) 254 lb 9.6 oz (115.5 kg) SpO2 BMI OB Status Smoking Status 97% 43.7 kg/m2 Postmenopausal Never Smoker Vitals History BMI and BSA Data Body Mass Index Body Surface Area 43.7 kg/m 2 2.28 m 2 Preferred Pharmacy Pharmacy Name Phone [facility], [address] [facility]. [phone] Your Updated Medication List  
  
   
This list is accurate as of 4/20/18 12:17 PM.  Always use your most recent med list.  
  
  
  
  
 albuterol 2.5 mg /3 mL (0.083 %) nebulizer solution Commonly known as:  PROVENTIL VENTOLIN  
2.5 mg by Nebulization route every four (4) hours as needed for Wheezing. LASIX 80 mg tablet Generic drug:  furosemide Take 40 mg by mouth two (2) times a day. omeprazole 20 mg capsule Commonly known as:  PRILOSEC Take 20 mg by mouth daily. spironolactone 100 mg tablet Commonly known as:  ALDACTONE Take 1 Tab by mouth daily. Indications: Edema ZOFRAN ODT 4 mg disintegrating tablet Generic drug:  ondansetron Take 4 mg by mouth every eight (8) hours as needed for Nausea. Prescriptions Sent to Pharmacy Refills  
 spironolactone (ALDACTONE) 100 mg tablet 3 Sig: Take 1 Tab by mouth daily. Indications: Edema Class: Normal  
 Pharmacy: [place] [id number].  #: [phone] Route: Oral  
  
We Performed the Following ACTIN (SMOOTH MUSCLE) ANTIBODY [id number] CPT(R)] ALPHA-1-ANTITRYPSIN, TOTAL, PHENOTYPE [19575 CPT(R)] GREG, DIRECT, W/REFLEX [id number] CPT(R)] ANCA PANEL [id number] CPT(R)] CBC W/O DIFF [91250 CPT(R)] HEPATIC FUNCTION PANEL [66204 CPT(R)] METABOLIC PANEL, BASIC [24314 CPT(R)] MITOCHONDRIAL M2 AB [id number] CPT(R)] PROTHROMBIN TIME + INR [03436 CPT(R)] Follow-up Instructions Return in about 4 weeks (around 5/18/2018) for Victoria Donald. To-Do List   
 04/20/2018  1:00 PM  
  Appointment with [address] [facility] [address] at [address] ([phone])  
  
 04/30/2018 Imaging:  US ABD COMP Introducing [address]! Dear Isela Higgins: Thank you for requesting a [employer] account. Our records indicate that you already have an active [employer] account. You can access your account anytime at [email or web address] Did you know that you can access your hospital and ER discharge instructions at any time in [employer]? You can also review all of your test results from your hospital stay or ER visit. Additional Information If you have questions, please visit the Frequently Asked Questions section of the [employer] website at [email or web address] Remember, [employer] is NOT to be used for urgent needs. For medical emergencies, dial 911. Now available from your iPhone and Android! Please provide this summary of care documentation to your next provider. Your primary care clinician is listed as Evangelina Durant. If you have any questions after today's visit, please call [phone].

## 2018-04-20 NOTE — PROGRESS NOTES
Outpatient Infusion Center Progress Note    1300 Pt admit to [facility] for Agustin Kincaid ambulatory in stable condition. Assessment completed. Pt has had a recent fall and broke her right arm. Sling is in place. IV established in the left arm with positive blood return. IV attached to NS at [address] (BP 1 Location: Left arm, BP Patient Position: At rest)    Pulse 94    Temp 98 °F (36.7 °C)    Resp 18    Wt 115.2 kg (254 lb)    Breastfeeding No    BMI 43.6 kg/m2       Medications:  Injectafer 750mg IVPB over 15 minutes    1435 Pt tolerated treatment well. D/c home ambulatory in no distress.  Pt has no further appts at this time

## 2018-04-20 NOTE — PROGRESS NOTES
Chief Complaint   Patient presents with    New Patient     cirrohis     Visit Vitals    /70 (BP 1 Location: Left arm, BP Patient Position: Sitting)    Pulse 100    Temp 97.1 °F (36.2 °C) (Tympanic)    Ht 5' 4\" (1.626 m)    Wt 254 lb 9.6 oz (115.5 kg)    SpO2 97%    BMI 43.7 kg/m2     PHQ over the last two weeks 4/20/2018   Little interest or pleasure in doing things Not at all   Feeling down, depressed or hopeless Not at all   Total Score PHQ 2 0

## 2018-05-13 PROBLEM — K75.81 NASH (NONALCOHOLIC STEATOHEPATITIS): Status: ACTIVE | Noted: 2018-01-01

## 2018-05-13 PROBLEM — D61.818 PANCYTOPENIA (HCC): Status: RESOLVED | Noted: 2018-01-01 | Resolved: 2018-01-01

## 2018-05-13 PROBLEM — L03.90 CELLULITIS: Status: RESOLVED | Noted: 2017-01-01 | Resolved: 2018-01-01

## 2018-05-13 PROBLEM — R06.09 DYSPNEA ON EFFORT: Status: RESOLVED | Noted: 2018-01-01 | Resolved: 2018-01-01

## 2018-05-15 NOTE — ED TRIAGE NOTES
\"She has been confused and lethargic this morning. The doctor sent her here, they think she has an elevated ammonia level. She has a liver disorder. \" Patient fell 8 weeks ago and broke her humerus, she arrives to ER with right arm in sling.

## 2018-05-15 NOTE — DISCHARGE INSTRUCTIONS
Learning About Hepatic Encephalopathy  What is hepatic encephalopathy? Hepatic encephalopathy (say \"hip-PAT-ik in-[facility]yfl-pqeu\") is a problem in the brain. It happens when the liver has been damaged and can't filter toxins from the blood. These toxins build up in your bloodstream and affect your brain. This can lead to personality changes. It can also make it hard to think clearly and remember things. It may be caused by long-term (chronic) liver disease. These diseases include cirrhosis, liver failure, and a type of high blood pressure in the veins that filter blood into the liver (portal hypertension). In some cases, symptoms are mild. In other cases, they can be very serious. It depends on the cause and how much the condition has progressed. Your doctor may do a few tests to diagnose this problem. These may include blood tests and memory tests. You may have MRI or CT scans, which show a picture of your brain. You also may have an EEG. This is a test that shows the electrical activity of your brain. What can you expect when you have it? Some cases of hepatic encephalopathy last just a short time. They may be cured with treatment. If the condition lasts a long time (is chronic), it usually gets worse over time. But if you work closely with your doctor and follow your treatment plan, you may make some symptoms less severe. Flare-ups of symptoms can happen. Following your treatment plan may help reduce flare-ups. If your symptoms are severe, you may need help with daily activities. You may need support at home. Severe cases may require a stay in the hospital.  What are the symptoms? Symptoms may include:  · Feeling cranky or grouchy. · Feeling depressed. · Having problems with finding words, thinking, concentrating, and remembering. · Feeling drowsy. · Not being able to sleep. How is it treated?   Treatment may include medicine to treat any other problems, such as bleeding in the digestive tract. Your doctor will also likely prescribe a medicine called lactulose. It increases bowel movements. This helps prevent the buildup of toxins in the blood that may lead to encephalopathy. Make sure to keep taking your medicine as directed, because it can keep the condition from quickly getting worse. Your doctor also may prescribe antibiotics, such as rifaximin. Your doctor also may talk to you about changes in diet. Certain foods may make symptoms worse. If treatment doesn't help, then a liver transplant may be needed. How can you prevent flare-ups? · You will probably need to limit the amount of protein you eat. You also may need to make other diet changes. Follow the diet plan your doctor recommends. · Be safe with medicines. Take your medicines exactly as prescribed. Call your doctor if you think you are having a problem with your medicine. · Check with your doctor before you use any new medicines. Some medicines can cause symptoms to flare up. Follow-up care is a key part of your treatment and safety. Be sure to make and go to all appointments, and call your doctor if you are having problems. It's also a good idea to know your test results and keep a list of the medicines you take. Where can you learn more? Go to http://sonny-mark.info/. Enter P025 in the search box to learn more about \"Learning About Hepatic Encephalopathy. \"  Current as of: May 12, 2017  Content Version: 11.4  © 1336-7116 [employer]. Care instructions adapted under license by [employer] (which disclaims liability or warranty for this information). If you have questions about a medical condition or this instruction, always ask your healthcare professional. [place] [address] any warranty or liability for your use of this information.

## 2018-05-15 NOTE — ED PROVIDER NOTES
HPI Comments: 77 y.o. female with past medical history significant for anemia, hypermenorrhea, thrombocytopenia, osteopenia, GERD, and liver cirrhosis secondary to ROBBINS who presents from home with chief complaint of confusion. Daughter states the patient has been fatigued and increasingly confused this morning. Daughter notes the patient was apparently wandering around the house and was unable to find where she was trying to go. Patient also used her phone backwards without switching back to normal.  admits the patient has been having trouble sleeping over the last few days and was apparently up last night with confusion. Daughter notes the patient recently had a fracture of the right humerus 2 months ago and may be scheduled to surgery soon. Daughter states the patient was found to have elevated ammonia levels in the office today and was referred to the ED. There are no other acute medical concerns at this time. Old Chart Review: Per note, patient last saw Dr Ta Marquez, her hepatologist, on 04/20/18 for f/u of liver cirrhosis secondary to ROBBINS. Social hx: Tobacco Use: No, Alcohol Use: No, drug Use: No    PCP: BRISEYDA Hartmann    Note written by Robert Sims, as dictated by Ricki Blackwell MD 1:39 PM      The history is provided by the patient and medical records.         Past Medical History:   Diagnosis Date    Anemia     GERD (gastroesophageal reflux disease)     H/O bone density study 10/28/2016    Osteopenia    Hypermenorrhea     Hypertension     Leiomyoma of uterus, unspecified     Liver cirrhosis secondary to ROBBINS ([address]     Menopausal syndrome     Osteopenia 2016    Thrombocytopenia ([address]     Transitional cell bladder cancer ([address]     Has been cleared by urology       Past Surgical History:   Procedure Laterality Date    COLONOSCOPY N/A 3/28/2018    COLONOSCOPY performed by Gurjit Ace MD at [address]  3/28/2018         HX HYSTEROSCOPY  03/1996    w/ D&C    HX OTHER SURGICAL  02/19/10    tumor in bladder removed x2 in 2010    HX OTHER SURGICAL  03/25/10    Liver Biopsy--Cirrhosis    HX TUBAL LIGATION      HX UROLOGICAL      tumor removed from bladder    UPPER GI ENDOSCOPY,DIAGNOSIS  3/28/2018              Family History:   Problem Relation Age of Onset    Diabetes Brother      Type II    Hypertension Brother     Heart Disease Father     Heart Attack Father 64    Hypertension Father     Hypertension Mother     Osteoporosis Mother      +Hip fracture    Hypertension Sister        Social History     Social History    Marital status:      Spouse name: N/A    Number of children: N/A    Years of education: N/A     Occupational History    Not on file. Social History Main Topics    Smoking status: Never Smoker    Smokeless tobacco: Never Used    Alcohol use No    Drug use: No    Sexual activity: Not Currently     Partners: Male     Birth control/ protection: None     Other Topics Concern    Not on file     Social History Narrative         ALLERGIES: Review of patient's allergies indicates no known allergies. Review of Systems   Constitutional: Positive for fatigue. Negative for chills and fever. HENT: Negative for ear pain and sore throat. Eyes: Negative for photophobia and pain. Respiratory: Negative for chest tightness and shortness of breath. Cardiovascular: Negative for chest pain and leg swelling. Gastrointestinal: Negative for abdominal pain, nausea and vomiting. Genitourinary: Negative for dysuria and flank pain. Musculoskeletal: Negative for back pain and neck pain. Skin: Negative for rash and wound. Neurological: Negative for dizziness, light-headedness and headaches. Psychiatric/Behavioral: Positive for confusion. All other systems reviewed and are negative.       Vitals:    05/15/18 1323   BP: 149/80   Pulse: (!) 112   Resp: 18   Temp: 97.4 °F (36.3 °C)   SpO2: 97% Weight: 115.2 kg (254 lb)   Height: 5' 4\" (1.626 m)            Physical Exam   Constitutional: She is oriented to person, place, and time. She appears well-developed and well-nourished. No distress. HENT:   Head: Normocephalic and atraumatic. Eyes: Conjunctivae and EOM are normal.   Neck: Normal range of motion. Cardiovascular: Normal rate, regular rhythm, normal heart sounds and intact distal pulses. No murmur heard. Pulmonary/Chest: Effort normal and breath sounds normal. No stridor. No respiratory distress. She has no wheezes. Abdominal: Soft. Bowel sounds are normal. There is no tenderness. Musculoskeletal: Normal range of motion. She exhibits tenderness. She exhibits no edema. Right arm in a sling (old humerus fracture)   Neurological: She is alert and oriented to person, place, and time. No cranial nerve deficit. +asterixis noted   Skin: She is not diaphoretic. Psychiatric: She has a normal mood and affect. Nursing note and vitals reviewed. MDM  Number of Diagnoses or Management Options  Hepatic encephalopathy syndrome [facility]):   Diagnosis management comments: Patient with hx of liver disease (ROBBINS) sent by PCP for further eval of increased confusion, increased drowsiness and decreased balance - likely hepatic encephalopathy. Ammonia level elevated - discussed outpatient treatment with lactulose - patient and family ok with plan and advised of how to adjust the medication prior to f/u with her liver doctor next week       Amount and/or Complexity of Data Reviewed  Clinical lab tests: ordered and reviewed  Decide to obtain previous medical records or to obtain history from someone other than the patient: yes          ED Course       Procedures    ED EKG interpretation:  Rhythm: sinus tachycardia; and regular . Rate (approx.): 114; Axis: normal; ST/T wave: normal; No signs of acute STEMI.   Note written by Elijio Rinne, Scribe, as dictated by Dao Franklin MD 1:40 PM    3:45 PM  Discussed lab results with the patient and family regarding elevated ammonia levels. They agree with outpatient treatment with lactulose at home. Patient has an appointment with liver doctor. VITALS:   Patient Vitals for the past 8 hrs:   Temp Pulse Resp BP SpO2   05/15/18 1545 - (!) 101 17 - 96 %   05/15/18 1456 - (!) 109 16 - 98 %   05/15/18 1431 - (!) 109 16 162/73 97 %   05/15/18 1323 97.4 °F (36.3 °C) (!) 112 18 149/80 97 %                  Recent Results (from the past 24 hour(s))   HEPATIC FUNCTION PANEL    Collection Time: 05/15/18  1:54 PM   Result Value Ref Range    Protein, total 7.7 6.4 - 8.2 g/dL    Albumin 2.8 (L) 3.5 - 5.0 g/dL    Globulin 4.9 (H) 2.0 - 4.0 g/dL    A-G Ratio 0.6 (L) 1.1 - 2.2      Bilirubin, total 2.4 (H) 0.2 - 1.0 MG/DL    Bilirubin, direct 0.8 (H) 0.0 - 0.2 MG/DL    Alk.  phosphatase 186 (H) 45 - 117 U/L    AST (SGOT) 51 (H) 15 - 37 U/L    ALT (SGPT) 28 12 - 78 U/L   AMMONIA    Collection Time: 05/15/18  3:01 PM   Result Value Ref Range    Ammonia 54 (H) <32 UMOL/L

## 2018-05-15 NOTE — ED NOTES
MD discussed dc instructions and information with pt. Pt verbalized understanding. Pt offered assistance out of ED via w/c and declined. Pt ambulatory with steady gait upon leave.   No signs of distress noted

## 2018-05-23 NOTE — PROGRESS NOTES
70 Chivo Patterson MD, FACP, [place], [place]       Sonali Alexander, GIOVANNA Calvo, ACNP-HAMMAD [place], EDUARD Momin [facility] [address]    at [facility]    [address] [facility], [address] [place] [address], [facility] 22.    [phone]    FAX: [address] [place] [address], [address]    [phone]    FAX: [phone]         Patient Care Team:  Lindsey Zepeda as PCP - Zenia Adams MD as Physician (Obstetrics & Gynecology)        Problem List  Date Reviewed: 5/13/2018          Codes Class Noted    ROBBINS (nonalcoholic steatohepatitis) ICD-10-CM: K75.81  ICD-9-CM: 571.8  5/13/2018        Anemia ICD-10-CM: D64.9  ICD-9-CM: 285.9  3/26/2018        Hypertension ICD-10-CM: I10  ICD-9-CM: 401.9  Unknown        Leukopenia ICD-10-CM: D72.819  ICD-9-CM: 288.50  3/26/2018        GERD (gastroesophageal reflux disease) ICD-10-CM: K21.9  ICD-9-CM: 530.81  Unknown        Thrombocytopenia ([facility] ICD-10-CM: D69.6  ICD-9-CM: 287.5  12/16/2017        DVT (deep venous thrombosis) ([facility] ICD-10-CM: I82.409  ICD-9-CM: 453.40  12/16/2017        Cirrhosis ([facility] ICD-10-CM: K74.60  ICD-9-CM: 571.5  12/16/2017        Esophageal varices ([facility] ICD-10-CM: I85.00  ICD-9-CM: 456.1  12/16/2017        Obesity, morbid ([facility] ICD-10-CM: E66.01  ICD-9-CM: 278.01  12/8/2017        Osteopenia ICD-10-CM: M85.80  ICD-9-CM: 733.90  1/1/2016        Menopausal syndrome ICD-10-CM: N95.1  ICD-9-CM: 627.2  Unknown            Maryan Hernandez returns to the [address] for management of cirrhosis secondary to non-alcoholic steatohepatitis (ROBBINS).  The active problem list, all pertinent past medical history, medications, liver histology, and laboratory findings related to the liver disorder were reviewed with the patient. The patient is a 77 y.o.  female who was found to have cirrhosis and ROBBINS on liver biopsy in 2010. Serologic evaluation for causes of chronic liver disease were negative. The most recent imaging of the liver was MRI performed in 2012. Results suggest cirrhosis. No liver mass lesions noted. An ECHO from 11/2017 shows mild pulmonary HTN, RV dilation and TR. The patient has not developed any major complications of cirrhosis to date. The most recent laboratory studies indicate the AST is normal, ALT is elevated, ALP is elevated, tests of hepatic synthetic and metabolic function are abnormal, total bilirubin is elevated, INR is elevated, albumin is depressed   and the platelet count is depressed. The patient notes fatigue and swelling of the lower extremities. The patient has not experienced pain in the right side over the liver, problems concentrating, swelling of the abdomen, hematemesis, hematochezia. The patient has mild limitations in functional activities which can be attributed to the liver disease and to other medical problems that are not related to the liver disease. ALLERGIES  No Known Allergies    MEDICATIONS  Current Outpatient Prescriptions   Medication Sig    spironolactone (ALDACTONE) 100 mg tablet Take 1 Tab by mouth daily. Indications: Edema    furosemide (LASIX) 40 mg tablet Take 1 Tab by mouth daily. Indications: Edema    ondansetron (ZOFRAN ODT) 4 mg disintegrating tablet Take 4 mg by mouth every eight (8) hours as needed for Nausea.  omeprazole (PRILOSEC) 20 mg capsule Take 20 mg by mouth daily.  albuterol (PROVENTIL VENTOLIN) 2.5 mg /3 mL (0.083 %) nebulizer solution 2.5 mg by Nebulization route every four (4) hours as needed for Wheezing. No current facility-administered medications for this visit.       SYSTEM REVIEW NOT RELATED TO LIVER DISEASE OR REVIEWED ABOVE:  Constitution systems: + weight loss (fluid), Negative for fever, chills, weight gain. Eyes: Negative for visual changes. ENT: Negative for sore throat, painful swallowing. Respiratory: Negative for cough, hemoptysis, SOB. Cardiology: Negative for chest pain, palpitations. GI:  Negative for constipation or diarrhea. : Negative for urinary frequency, dysuria, hematuria, nocturia. Skin: Negative for rash. Hematology: Negative for easy bruising, blood clots. Musculo-skeletal: Recent right humerus fracture - still causing pain. Negative for back pain, muscle pain, weakness. Neurologic: Negative for headaches, dizziness, vertigo, memory problems not related to HE. Psychology: Negative for anxiety, depression. FAMILY HISTORY:  There is no family history of liver disease. SOCIAL HISTORY:  The patient is . The patient has 4 children and 10 grandchildren. The patient has never used tobacco products. The patient has never consumed significant amounts of alcohol. 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 PHYSICAL EXAMINATION:  Visit Vitals    /61 (BP 1 Location: Left arm, BP Patient Position: Sitting)    Pulse 84    Temp 97.4 °F (36.3 °C) (Tympanic)    Ht 5' 4\" (1.626 m)    Wt 225 lb (102.1 kg)    SpO2 97%    BMI 38.62 kg/m2     General: No acute distress. Obese. Eyes: Sclera anicteric. ENT: No oral lesions. Nodes: No adenopathy. Skin: No spider angiomata. No jaundice. No palmar erythema. Respiratory: Lungs clear to auscultation. Cardiovascular: Regular heart rate. No murmurs. No JVD. Abdomen: Soft non-tender. Liver size normal to percussion/palpation. Spleen not palpable. No obvious ascites. Extremities: +2 bilateral pitting edema up to mid tibia. Feet are 2+. No muscle wasting. No gross arthritic changes. Sling on RUE. Neurologic: Alert and oriented. Cranial nerves grossly intact. No asterixis.     LABORATORY STUDIES:  [place] of [address] [place] [address] & Units 2018   WBC 3.4 - 10.8 x10E3/uL 3.9   ANC 1.8 - 8.0 K/UL    HGB 11.1 - 15.9 g/dL 10.9 (L)    - 379 x10E3/uL 85 (LL)   INR 0.8 - 1.2 1.5 (H)   AST 0 - 40 IU/L 54 (H)   ALT 0 - 32 IU/L 20   Alk Phos 39 - 117 IU/L 177 (H)   Bili, Total 0.0 - 1.2 mg/dL 2.3 (H)   Bili, Direct 0.00 - 0.40 mg/dL 0.85 (H)   Albumin 3.5 - 4.8 g/dL 2.7 (L)   BUN 8 - 27 mg/dL 11   Creat 0.57 - 1.00 mg/dL 0.78   Na 134 - 144 mmol/L 140   K 3.5 - 5.2 mmol/L 3.8   Cl 96 - 106 mmol/L 102   CO2 18 - 29 mmol/L 27   Glucose 65 - 99 mg/dL 93   Magnesium 1.6 - 2.4 mg/dL    Ammonia <32 UMOL/L      SEROLOGIES:  Serologies Latest Ref Rng & Units 2018 3/26/2018   Ferritin 8 - 252 NG/ML  20   Iron % Saturation 20 - 50 %  12 (L)   GREG Ab, Direct Negative Negative    C-ANCA Neg:<1:20 titer <1:20    P-ANCA Neg:<1:20 titer <1:20    ANCA Neg:<1:20 titer <1:20    ASMCA 0 - 19 Units 17    M2 Ab 0.0 - 20.0 Units 6.1    Alpha-1 antitrypsin level 90 - 200 mg/dL 128      Serologies Latest Ref Rng & Units 2017   Hep B Surface Ag Index <0.10   Hep B Surface Ag Interp NEG   NEGATIVE   Hep B Surface Ab mIU/mL <3.10   Hep B Surface Ab Interp  NONREACTIVE   Hep C Ab NR   NONREACTIVE     LIVER HISTOLOGY:  2010. Reviewed at [facility]. ROBBINS with cirrhosis. ENDOSCOPIC PROCEDURES:  2012. EGD performed by Dr Joel Dia. Small esophageal varices. No banding performed. Mild portal gastropathy. RADIOLOGY:  2012. MRI scan abdomen. Changes consistent with cirrhosis. No liver mass lesions. No dilated bile ducts. No bile duct strictures. No ascites. OTHER TESTIN2017. ECHO heart. Normal LVEF 55-65%, LA dilated, RV dilated, TR. PAP 44 mmHg. ASSESSMENT AND PLAN:  Cirrhosis secondary to ROBBINS. Have ordered hepatic panel, BMP, INR, CBC with platelet count to assess liver function and recalculate MELD score.     The most recent laboratory studies indicate the AST is elevated, ALT is normal, alkaline phosphatase is elevated, total bilirubin is elevated, albumin is depressed, and the platelet count is depressed. The patient is not a candidate for liver transplantation because of pulmonary hypertension and TR. The CTP is 9. Child class B. The MELD score is 14. Lower extremity edema is responding to current doses of diuretics. Continue current step 1 dosing. Esophageal varices were evaluated via EGD on 3/28/2018. Hepatic encephalopathy developed this month. She was quickly normalized with TID lactulose. Continue this dosing and a new prescription with refills was sent to the pharmacy. The patient was directed to continue all current medications at the current dosages. There are no contraindications for the patient to take any medications that are necessary for treatment of other medical issues. The patient was counseled regarding alcohol consumption. Thrombocytopenia secondary to cirrhosis. There is no evidence of overt bleeding. Neutropenia secondary to cirrhosis. There is no evidence of infection. There is no indication for GCSF at this time. Anemia from chronic GI blood loss from portal hypertension and iron deficiency. The risk of osteoporosis is increased in patients with cirrhosis. DEXA bone density to assess for osteoporosis demonstrated osteopenia. The need for vaccination against viral hepatitis A will be assessed with serologic and instituted as appropriate. [facility] screening will be performed. AFP was ordered today. Ultrasound cannot be completed at this time due to her right humeral fracture which is not healing. All of the above issues were discussed with the patient. All questions were answered. The patient expressed a clear understanding of the above. [facility] 87 in 4 weeks.      Sowmya Dejesus, [facility] [address], [address]  [address].  [phone]

## 2018-05-23 NOTE — MR AVS SNAPSHOT
[address] [facility] [id number] [address] [place] [address] 
[phone] Patient: Gt Ashford MRN: [id number] Visit Information Date & Time Provider Department Dept. Phone Encounter #  
 5/23/2018 11:15 AM Olegario Horn, [facility] 219 [id number] Your Appointments 6/18/2018 10:30 AM  
ESTABLISHED PATIENT with MD Yamilka Ware Oncology at [address]) Appt Note: Anemia f/u, Thorn 10 week follow up.  
 [address] [place], [place] [address] [phone]  
  
   
 [address] [place] [address] Upcoming Health Maintenance Date Due DTaP/Tdap/Td series (1 - Tdap) 1/23/1969 ZOSTER VACCINE AGE 60> 11/23/2007 GLAUCOMA SCREENING Q2Y 1/23/2013 Pneumococcal 65+ High/Highest Risk (1 of 2 - PCV13) 1/23/2013 MEDICARE YEARLY EXAM 3/26/2018 Influenza Age 5 to Adult 8/1/2018 FOBT Q 1 YEAR AGE 50-75 3/27/2019 BREAST CANCER SCRN MAMMOGRAM 11/17/2019 Allergies as of 5/23/2018  Review Complete On: 5/23/2018 By: Jhony Mead LPN No Known Allergies Current Immunizations  Reviewed on 4/20/2018 Name Date Influenza High Dose Vaccine PF 1/26/2018 11:28 AM  
  
 Not reviewed this visit You Were Diagnosed With   
  
 Codes Comments ROBBINS (nonalcoholic steatohepatitis)    -  Primary ICD-10-CM: G35.50 ICD-9-CM: 571.8 Vitals BP Pulse Temp Height(growth percentile) Weight(growth percentile) 123/61 (BP 1 Location: Left arm, BP Patient Position: Sitting) 84 97.4 °F (36.3 °C) (Tympanic) 5' 4\" (1.626 m) 225 lb (102.1 kg) SpO2 BMI OB Status Smoking Status 97% 38.62 kg/m2 Postmenopausal Never Smoker Vitals History BMI and BSA Data Body Mass Index Body Surface Area  
 38.62 kg/m 2 2.15 m 2 Preferred Pharmacy Pharmacy Name Phone [address] [facility], [address] [facility]. [phone] Your Updated Medication List  
  
   
This list is accurate as of 5/23/18 11:30 AM.  Always use your most recent med list.  
  
  
  
  
 albuterol 2.5 mg /3 mL (0.083 %) nebulizer solution Commonly known as:  PROVENTIL VENTOLIN  
2.5 mg by Nebulization route every four (4) hours as needed for Wheezing. furosemide 40 mg tablet Commonly known as:  LASIX Take 1 Tab by mouth daily. Indications: Edema  
  
 omeprazole 20 mg capsule Commonly known as:  PRILOSEC Take 20 mg by mouth daily. spironolactone 100 mg tablet Commonly known as:  ALDACTONE Take 1 Tab by mouth daily. Indications: Edema ZOFRAN ODT 4 mg disintegrating tablet Generic drug:  ondansetron Take 4 mg by mouth every eight (8) hours as needed for Nausea. We Performed the Following AFP WITH AFP-L3% [[id number] Custom] AMMONIA [id number] CPT(R)] CBC W/O DIFF [30269 CPT(R)] HEPATIC FUNCTION PANEL [62400 CPT(R)] METABOLIC PANEL, BASIC [15362 CPT(R)] PROTHROMBIN TIME + INR [63496 CPT(R)] Introducing [facility] & HEALTH SERVICES! Dear Michale Ormond: Thank you for requesting a Virtual Web account. Our records indicate that you already have an active Virtual Web account. You can access your account anytime at [email or web address] Did you know that you can access your hospital and ER discharge instructions at any time in Virtual Web? You can also review all of your test results from your hospital stay or ER visit. Additional Information If you have questions, please visit the Frequently Asked Questions section of the Virtual Web website at [email or web address] Remember, Virtual Web is NOT to be used for urgent needs. For medical emergencies, dial 911. Now available from your iPhone and Android! Please provide this summary of care documentation to your next provider. Your primary care clinician is listed as Shana Vásquez. If you have any questions after today's visit, please call [phone].

## 2018-05-31 NOTE — PERIOP NOTES
Spoke with Karis Blake at Dr. Tenzin Mckeon office and reported pt h/o thrombocytopenia and PLT were 87 on 5/23/18. Karis Blake stated Dr. Jaz Hull is aware and no repeat lab work is needed. Spoke with Dr. Johana Mayo in anesthesia and reported pt h/o thrombocytopenia and PLT were 87 on 5/23/18. No repeat labwork needed per Dr. Johana Mayo.

## 2018-06-01 NOTE — PROGRESS NOTES
Spoke with PCP, Lindsey Wagoner. She was getting surgical clearance for right humeral fracture. Noted thrombocytopenia and he said she was retaining fluid. We increased her diuretics to step 2 and I just advised him to check BMP and kidney function in a week or so. Tammi Coleman NP  7:30 AM

## 2018-06-05 PROBLEM — S42.301K: Status: ACTIVE | Noted: 2018-01-01

## 2018-06-05 NOTE — ANESTHESIA PREPROCEDURE EVALUATION
Anesthetic History   No history of anesthetic complications            Review of Systems / Medical History  Patient summary reviewed, nursing notes reviewed and pertinent labs reviewed    Pulmonary        Sleep apnea: CPAP           Neuro/Psych   Within defined limits           Cardiovascular    Hypertension                   GI/Hepatic/Renal     GERD      Liver disease     Endo/Other        Morbid obesity, cancer and anemia     Other Findings                   Anesthetic Plan    ASA: 3  Anesthesia type: general      Post-op pain plan if not by surgeon: peripheral nerve block single    Induction: Intravenous  Anesthetic plan and risks discussed with: Patient

## 2018-06-05 NOTE — PROGRESS NOTES
Occupational Therapy Screening:  Services maybe indicated at this time. [facility] therapy based on results obtained during the nursing admission assessment. The patients chart was reviewed . Please order a consult for occupational therapy if patient has had a decline in function from baseline and you would like an evaluation to be completed. Thank you.       Cyd Snellen, OT

## 2018-06-05 NOTE — BRIEF OP NOTE
BRIEF OPERATIVE NOTE    Date of Procedure: 6/5/2018   Preoperative Diagnosis: RIGHT SHOULDER HUMERUS FRACTURE  Postoperative Diagnosis: RIGHT SHOULDER HUMERUS FRACTURE    Procedure(s):  RIGHT humerus OPEN REDUCTION INTERNAL FIXATION , right elbow capsular release     * Woody Wright DO - Primary         Surgical Assistant: Desiree Dong staff    Surgical Staff:  Circ-1: Guerita Jim  Radiology Technician: nAdrew Torres RT  Scrub Tech-1: Jovita Olivas  Scrub Tech-2: Dianna Payne RN-Relief: Smith Mobley RN  Surg Asst-1: Judy Alford  Event Time In   Incision Start 1336   Incision Close      Anesthesia: Other   Estimated Blood Loss: 700cc  Specimens: * No specimens in log *   Findings: humeral shaft nonunion   Complications: none  Implants:   Implant Name Type Inv.  Item Serial No.  Lot No. LRB No. Used Action   BONE CHIP CANC [address] 1-8MM 40ML -- PCAN1/2 PRESERVON - [id number]  BONE CHIP CANC [address] 1-8MM 40ML -- PCAN1/2 PRESERVON [id number] [facility] TISSUE BANK NA Right 1 Implanted   SCR BNE CRTX ST HEX 3.5X26MM -- SS - SNA   SCR BNE CRTX ST HEX 3.5X26MM -- SS NA SYNTHES [place] NA Right 1 Implanted

## 2018-06-05 NOTE — IP AVS SNAPSHOT
[address] [facility] [address] 
[phone] Patient: Phill Alcantara MRN: [id number] CCX:4/69/[id number] A check joya indicates which time of day the medication should be taken. My Medications START taking these medications Instructions Each Dose to Equal  
 Morning Noon Evening Bedtime  
 oxyCODONE IR 5 mg immediate release tablet Commonly known as:  William Greg Your last dose was: Your next dose is: Take 1 Tab by mouth every four (4) hours as needed for Pain. Max Daily Amount: 30 mg.  
 5 mg CHANGE how you take these medications Instructions Each Dose to Equal  
 Morning Noon Evening Bedtime  
 spironolactone 100 mg tablet Commonly known as:  ALDACTONE What changed:  how much to take Your last dose was: Your next dose is: Take 1 Tab by mouth daily. Indications: Edema 100 mg CONTINUE taking these medications Instructions Each Dose to Equal  
 Morning Noon Evening Bedtime  
 albuterol 2.5 mg /3 mL (0.083 %) nebulizer solution Commonly known as:  PROVENTIL VENTOLIN Your last dose was: Your next dose is:    
   
   
 2.5 mg by Nebulization route every four (4) hours as needed for Wheezing. 2.5 mg  
    
   
   
   
  
 cpap machine kit Your last dose was: Your next dose is:    
   
   
 by Does Not Apply route nightly. furosemide 80 mg tablet Commonly known as:  LASIX Your last dose was: Your next dose is: Take 80 mg by mouth daily. 80 mg  
    
   
   
   
  
 lactulose 10 gram/15 mL solution Commonly known as:  Esme Mcrae Your last dose was: Your next dose is: Take 45 mL by mouth three (3) times daily. Indications: Hepatic Encephalopathy 30 g  
    
   
   
   
  
 omeprazole 20 mg capsule Commonly known as:  PRILOSEC Your last dose was: Your next dose is: Take 20 mg by mouth daily. 20 mg  
    
   
   
   
  
 ZOFRAN ODT 4 mg disintegrating tablet Generic drug:  ondansetron Your last dose was: Your next dose is: Take 4 mg by mouth every eight (8) hours as needed for Nausea. 4 mg Where to Get Your Medications Information on where to get these meds will be given to you by the nurse or doctor. ! Ask your nurse or doctor about these medications  
  oxyCODONE IR 5 mg immediate release tablet

## 2018-06-05 NOTE — PERIOP NOTES
TRANSFER - OUT REPORT:    Verbal report given to Lo(name) on [place]  being transferred to (unit) for routine post - op       Report consisted of patients Situation, Background, Assessment and   Recommendations(SBAR). Time Pre op antibiotic given:1322  Anesthesia Stop time: 5414  Bray Present on Transfer to floor:no  Order for Bray on Chart:no  Discharge Prescriptions with Chart:yes    Information from the following report(s) SBAR, OR Summary and MAR was reviewed with the receiving nurse. Opportunity for questions and clarification was provided. Is the patient on 02? NO       L/Min 0  Is the patient on a monitor? NO    Is the nurse transporting with the patient? NO    Surgical Waiting Area notified of patient's transfer from PACU? YES; pt family left for dinner, called daughter Pravin Quinteros on cell phone: [phone]      The following personal items collected during your admission accompanied patient upon transfer:   Dental Appliance: Dental Appliances: (S)  (patient does not wear partials or dentures)  Vision: Visual Aid: Glasses  Hearing Aid:    Jewelry: Jewelry: None  Clothing: Clothing:  (bag of clothes returned to patient in pacu)  Other Valuables:  Other Valuables: Eyeglasses (returned to patient in pacu)  Valuables sent to safe:

## 2018-06-05 NOTE — ANESTHESIA POSTPROCEDURE EVALUATION
Post-Anesthesia Evaluation and Assessment    Patient: Danny Miner MRN: [id number]  SSN: [id number]    YOB: 1950  Age: 77 y.o. Sex: female       Cardiovascular Function/Vital Signs  Visit Vitals    /60    Pulse (!) 114    Temp 36 °C (96.8 °F)    Resp 17    Ht 5' 3\" (1.6 m)    Wt 102.1 kg (225 lb)    SpO2 98%    BMI 39.86 kg/m2       Patient is status post general anesthesia for Procedure(s):  RIGHT SHOULDER OPEN REDUCTION INTERNAL FIXATION VERSUS INTRAMEDULLARY NAIL (CHOICE). Nausea/Vomiting: None    Postoperative hydration reviewed and adequate. Pain:  Pain Scale 1: (P) Numeric (0 - 10) (06/05/18 1554)  Pain Intensity 1: (P) 0 (06/05/18 1554)   Managed    Neurological Status:   Neuro (WDL): (P) Exceptions to WDL (06/05/18 1554)  Neuro  Neurologic State: (P) Drowsy; Eyes open to stimulus (06/05/18 1554)  RUE Motor Response: (P) Numbness (block) (06/05/18 1554)   At baseline    Mental Status and Level of Consciousness: Arousable    Pulmonary Status:   O2 Device: CO2 nasal cannula (06/05/18 1554)   Adequate oxygenation and airway patent    Complications related to anesthesia: None    Post-anesthesia assessment completed.  No concerns    Signed By: Charla Pagan MD     June 5, 2018

## 2018-06-05 NOTE — ANESTHESIA PROCEDURE NOTES
Peripheral Block    Start time: 6/5/2018 12:34 PM  End time: 6/5/2018 12:41 PM  Performed by: Davina Sawant  Authorized by: Davina Sawant       Pre-procedure: Indications: at surgeon's request and post-op pain management    Preanesthetic Checklist: patient identified, risks and benefits discussed, site marked, timeout performed, anesthesia consent given and patient being monitored    Timeout Time: 12:34          Block Type:   Block Type:   Interscalene  Laterality:  Right  Monitoring:  Standard ASA monitoring, continuous pulse ox, frequent vital sign checks, heart rate, responsive to questions and oxygen  Injection Technique:  Single shot  Procedures: nerve stimulator    Patient Position: supine  Prep: chlorhexidine    Location:  Interscalene  Needle Type:  Stimuplex  Needle Gauge:  22 G  Needle Localization:  Nerve stimulator  Motor Response: minimal motor response >0.4 mA    Medication Injected:  0.5%  ropivacaine  Volume (mL):  30    Assessment:  Number of attempts:  1  Injection Assessment:  Incremental injection every 5 mL, negative aspiration for blood, no paresthesia, negative aspiration for CSF and no intravascular symptoms  Patient tolerance:  Patient tolerated the procedure well with no immediate complications

## 2018-06-05 NOTE — IP AVS SNAPSHOT
[address] [facility] [address] [place] [address] 
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 
[phone] Your Scheduled Appointments Monday June 18, 2018 10:30 AM EDT  
ESTABLISHED PATIENT with MD Yamilka Viera Cha Oncology at [address] [facility] [address]  
[phone] Wednesday July 18, 2018 10:30 AM EDT Follow Up with Venkatesh Javier NP [address] ([facility]) 200 [facility] [phone] [address] [place] [address]  
[phone] Discharge Orders None A check joya indicates which time of day the medication should be taken. My Medications START taking these medications Instructions Each Dose to Equal  
 Morning Noon Evening Bedtime  
 oxyCODONE IR 5 mg immediate release tablet Commonly known as:  Rebecca Magyar Your last dose was: Your next dose is: Take 1 Tab by mouth every four (4) hours as needed for Pain. Max Daily Amount: 30 mg.  
 5 mg CHANGE how you take these medications Instructions Each Dose to Equal  
 Morning Noon Evening Bedtime  
 spironolactone 100 mg tablet Commonly known as:  ALDACTONE What changed:  how much to take Your last dose was: Your next dose is: Take 1 Tab by mouth daily. Indications: Edema  100 mg  
    
   
   
 CONTINUE taking these medications Instructions Each Dose to Equal  
 Morning Noon Evening Bedtime  
 albuterol 2.5 mg /3 mL (0.083 %) nebulizer solution Commonly known as:  PROVENTIL VENTOLIN Your last dose was: Your next dose is:    
   
   
 2.5 mg by Nebulization route every four (4) hours as needed for Wheezing. 2.5 mg  
    
   
   
   
  
 cpap machine kit Your last dose was: Your next dose is:    
   
   
 by Does Not Apply route nightly. furosemide 80 mg tablet Commonly known as:  LASIX Your last dose was: Your next dose is: Take 80 mg by mouth daily. 80 mg  
    
   
   
   
  
 lactulose 10 gram/15 mL solution Commonly known as:  Adron Query Your last dose was: Your next dose is: Take 45 mL by mouth three (3) times daily. Indications: Hepatic Encephalopathy 30 g  
    
   
   
   
  
 omeprazole 20 mg capsule Commonly known as:  PRILOSEC Your last dose was: Your next dose is: Take 20 mg by mouth daily. 20 mg  
    
   
   
   
  
 ZOFRAN ODT 4 mg disintegrating tablet Generic drug:  ondansetron Your last dose was: Your next dose is: Take 4 mg by mouth every eight (8) hours as needed for Nausea. 4 mg Where to Get Your Medications Information on where to get these meds will be given to you by the nurse or doctor. ! Ask your nurse or doctor about these medications  
  oxyCODONE IR 5 mg immediate release tablet Opioid Education Prescription Opioids: What You Need to Know: 
 
Prescription opioids can be used to help relieve moderate-to-severe pain and are often prescribed following a surgery or injury, or for certain health conditions. These medications can be an important part of treatment but also come with serious risks.   Opioids are strong pain medicines. Examples include hydrocodone, oxycodone, fentanyl, and morphine. Heroin is an example of an illegal opioid. It is important to work with your health care provider to make sure you are getting the safest, most effective care. WHAT ARE THE RISKS AND SIDE EFFECTS OF OPIOID USE? Prescription opioids carry serious risks of addiction and overdose, especially with prolonged use. An opioid overdose, often marked by slow breathing, can cause sudden death. The use of prescription opioids can have a number of side effects as well, even when taken as directed. · Tolerance-meaning you might need to take more of a medication for the same pain relief · Physical dependence-meaning you have symptoms of withdrawal when the medication is stopped. Withdrawal symptoms can include nausea, sweating, chills, diarrhea, stomach cramps, and muscle aches. Withdrawal can last up to several weeks, depending on which drug you took and how long you took it. · Increased sensitivity to pain · Constipation · Nausea, vomiting, and dry mouth · Sleepiness and dizziness · Confusion · Depression · Low levels of testosterone that can result in lower sex drive, energy, and strength · Itching and sweating RISKS ARE GREATER WITH:      
· History of drug misuse, substance use disorder, or overdose · Mental health conditions (such as depression or anxiety) · Sleep apnea · Older age (72 years or older) · Pregnancy Avoid alcohol while taking prescription opioids. Also, unless specifically advised by your health care provider, medications to avoid include: · Benzodiazepines (such as Xanax or Valium) · Muscle relaxants (such as Soma or Flexeril) · Hypnotics (such as Ambien or Lunesta) · Other prescription opioids KNOW YOUR OPTIONS Talk to your health care provider about ways to manage your pain that don't involve prescription opioids.   Some of these options may actually work better and have fewer risks and side effects. Options may include: 
· Pain relievers such as acetaminophen, ibuprofen, and naproxen · Some medications that are also used for depression or seizures · Physical therapy and exercise · Counseling to help patients learn how to cope better with triggers of pain and stress. · Application of heat or cold compress · Massage therapy · Relaxation techniques Be Informed Make sure you know the name of your medication, how much and how often to take it, and its potential risks & side effects. IF YOU ARE PRESCRIBED OPIOIDS FOR PAIN: 
· Never take opioids in greater amounts or more often than prescribed. Remember the goal is not to be pain-free but to manage your pain at a tolerable level. · Follow up with your primary care provider to: · Work together to create a plan on how to manage your pain. · Talk about ways to help manage your pain that don't involve prescription opioids. · Talk about any and all concerns and side effects. · Help prevent misuse and abuse. · Never sell or share prescription opioids · Help prevent misuse and abuse. · Store prescription opioids in a secure place and out of reach of others (this may include visitors, children, friends, and family). · Safely dispose of unused/unwanted prescription opioids: Find your community drug take-back program or your pharmacy mail-back program, or flush them down the toilet, following guidance from the Food and Drug Administration (www.fda.gov/Drugs/ResourcesForYou). · Visit www.cdc.gov/drugoverdose to learn about the risks of opioid abuse and overdose. · If you believe you may be struggling with addiction, tell your health care provider and ask for guidance or call [address] [place], [place] [address] at 0-834-084-MFGU. Discharge Instructions Follow up appointments Call Deloris Koehler at [phone] to schedule follow up appt with Dr. Sanna Cabello in  10 - 14 days. When to call your Orthopaedic Surgeon: 
-unrelieved pain 
-Signs of infection-if your incision is red; continues to have drainage; drainage has a foul odor or if you have a persistent fever over 101 degrees 
-Signs of a blood clot in your leg-calf pain, tenderness, redness, swelling of lower leg When to call your Primary Care Physician: 
-Concerns about medical conditions such as diabetes, high blood pressure, asthma, congestive heart failure 
-Call if blood sugars are elevated, persistent headache or dizziness, coughing or congestion, constipation or diarrhea, burning with urination, abnormal heart rate When to call 911and go to the nearest emergency room 
-acute onset of chest pain, shortness of breath, difficulty breathing Activity - no weight bearing with right arm. May perform shoulder, elbow and wrist Range of motion as tolerated Incision Care - keep dressing clean and dry,  May remove and shower in 4 days. Preventing blood clots - ambulate daily Pain management 
-take pain medication as prescribed; decrease the amount you use as your pain lessens 
-avoid alcoholic beverages while taking pain medication 
-Please be aware that many medications contain Tylenol. We do not want you to over medicate so please read the information below as a guide. Do not take more than 4 Grams of Tylenol in a 24 hour period. (There are 1000 milligrams in one Gram) Percocet contains 325 mg of Tylenol per tablet (do not take more than 12 tablets in 24 hours) Lortab contains 500 mg of Tylenol per tablet (do not take more than 8 tablets in 24 hours) Norco contains 325 mg of Tylenol per tablet (do not take more than 12 tablets in 24 hours). -You may place an ice bag on your affected extremity Diet 
-resume usual diet; drink plenty of fluids; eat foods high in fiber -you may want to take a stool softener (such as Senokot-S or Colace) to prevent constipation while you are taking pain medication. If constipation occurs, take a laxative (such as Dulcolax tablets, Milk of Magnesia, or a suppository) Introducing [facility]! Dear Aubree Banerjee: Thank you for requesting a [employer] account. Our records indicate that you already have an active [employer] account. You can access your account anytime at [email or web address] Did you know that you can access your hospital and ER discharge instructions at any time in Equiom? You can also review all of your test results from your hospital stay or ER visit. Additional Information If you have questions, please visit the Frequently Asked Questions section of the [employer] website at [email or web address] Remember, [employer]iom is NOT to be used for urgent needs. For medical emergencies, dial 911. Now available from your iPhone and Android! Introducing Jeb Jerez As a [employer] patient, I wanted to make you aware of our electronic visit tool called Jeb Jerez. [employer] 24/7 allows you to connect within minutes with a medical provider 24 hours a day, seven days a week via a mobile device or tablet or logging into a secure website from your computer. You can access Jeb Jerez from anywhere in the [place]. A virtual visit might be right for you when you have a simple condition and feel like you just dont want to get out of bed, or cant get away from work for an appointment, when your regular [employer] provider is not available (evenings, weekends or holidays), or when youre out of town and need minor care. Electronic visits cost only $49 and if the [employer] 24/7 provider determines a prescription is needed to treat your condition, one can be electronically transmitted to a nearby pharmacy*. Please take a moment to enroll today if you have not already done so. The enrollment process is free and takes just a few minutes. To enroll, please download the [employer] 24/7 beka to your tablet or phone, or visit [email or web address] to enroll on your computer. And, as an [address] [place] [address] patient with a [employer] account, the results of your visits will be scanned into your electronic medical record and your primary care provider will be able to view the scanned results. We urge you to continue to see your regular [employer] provider for your ongoing medical care. And while your primary care provider may not be the one available when you seek a [employer] virtual visit, the peace of mind you get from getting a real diagnosis real time can be priceless. For more information on [employer], view our Frequently Asked Questions (FAQs) at [email or web address]. Sincerely, 
 
Shivani Garcia MD 
Chief Medical Officer Misty Pilar Maciel *:  certain medications cannot be prescribed via [employer] Providers Seen During Your Hospitalization Provider Specialty Primary office phone Zachery Dailey, [address] [place] [address] Orthopedic Surgery [phone] Your Primary Care Physician (PCP) Primary Care Physician Office Phone Office Fax Ema Mejia [phone] You are allergic to the following Allergen Reactions Adhesive Tape-Silicones Rash Recent Documentation Height Weight Breastfeeding? BMI OB Status Smoking Status 1.6 m 102.1 kg No 39.86 kg/m2 Postmenopausal Never Smoker Emergency Contacts Name Discharge Info Relation Home Work Mobile Alejo Mckeon DISCHARGE CAREGIVER [3] Spouse [3] [phone] Alejo Mckeon  Spouse [3] [phone] Patient Belongings The following personal items are in your possession at time of discharge: Dental Appliances: (S)  (patient does not wear partials or dentures)  Visual Aid: Glasses   Hearing Aids/Status: Does not own  Home Medications: None   Jewelry: None  Clothing:  (bag of clothes returned to patient in pacu)    Other Valuables: Eyeglasses (returned to patient in pacu) Please provide this summary of care documentation to your next provider. Signatures-by signing, you are acknowledging that this After Visit Summary has been reviewed with you and you have received a copy. Patient Signature:  ____________________________________________________________ Date:  ____________________________________________________________  
  
[facility] Provider Signature:  ____________________________________________________________ Date:  ____________________________________________________________

## 2018-06-05 NOTE — PROGRESS NOTES
TRANSFER - IN REPORT:    Verbal report received from LACI Patel(name) on Tresia Rota  being received from [facility]) for routine post - op      Report consisted of patients Situation, Background, Assessment and   Recommendations(SBAR). Information from the following report(s) SBAR, MAR and Recent Results was reviewed with the receiving nurse. Opportunity for questions and clarification was provided. Assessment completed upon patients arrival to unit and care assumed.            1950: Primary Nurse Latoya Swartz RN and Deluca RN performed a dual skin assessment on this patient No impairment noted

## 2018-06-06 NOTE — PROGRESS NOTES
Reason for Admission:   Post-op Pain/ Right Shoulder Humerus Fracture  RRAT Score:     24  Resources/supports as identified by patient/family:   Patient's family, Olman Rodríguez (Spouse) [phone] and Simone King (Daughter)  Top Challenges facing patient (as identified by patient/family and CM): None at this time  Finances/Medication cost?    Patient receives SS and source of income and reports no significant financial stressors or concerns at this time. Pharmacy utilized for prescriptions is [facility]. Transportation? Mode of transport at time of discharge to be provided by patient's family. Support system or lack thereof? Olman Rodríguez (Spouse) [phone] and Simone King (Daughter)  Living arrangements? Patient resides with spouse in their own 1 story home with 3 steps to enter  Self-care/ADLs/Cognition? Patient is alert and oriented. Patient independent with ADL's and IADL's. DME in the home consist of a cane. Current Advanced Directive/Advance Care Plan:  Patient with no advance directives on file. Will review at a later time. Plan for utilizing home health:    No needs at this time. Disposition needs TBD/subject to change pending recommendations. Likelihood of readmission: Low                 Transition of Care Plan:        Anticipated plan is for patient to discharge home with family assistance. There are no current CM consults or needs. PT recommend outpatient rehab when cleared by Ortho. CM will continue to follow and assist with disposition needs as they arise. Care Management Interventions  PCP Verified by CM:  Yes  Palliative Care Criteria Met (RRAT>21 & CHF Dx)?: No  Mode of Transport at Discharge: BLS (Patients family to assist with transport at discharge)  Transition of Care Consult (CM Consult):  (There are no current CM consults or needs at this time)  Discharge Durable Medical Equipment: No  Physical Therapy Consult: Yes  Occupational Therapy Consult: Yes  Speech Therapy Consult: No  Current Support Network: Lives with Spouse, Own Home (1 story home 3 steps to enter)  Confirm Follow Up Transport: Family  Plan discussed with Pt/Family/Caregiver: Yes  Freedom of Choice Offered: Yes  Discharge Location  Discharge Placement: Home with family assistance    FLOR Friedman/MARGO  3:46 PM

## 2018-06-06 NOTE — OP NOTES
[address]  OPERATIVE REPORT    Norma Mariano  MR#: [id number]  : 1950  ACCOUNT #: [de-identified]   DATE OF SERVICE: 2018    PREOPERATIVE DIAGNOSES:    1. Right humeral shaft fracture with nonunion. 2.  Right elbow stiffness and capsular adhesions. POSTOPERATIVE DIAGNOSES:    1. Right humeral shaft fracture with nonunion. 2.  Right elbow stiffness and capsular adhesions. PROCEDURES PERFORMED:    1. Right humerus open reduction internal fixation with cancellous bone grafting. 2.  Right elbow capsular release and lysis of adhesions. SURGEON:  Dilma Garcia DO    ASSISTANT:  [facility] staff. ANESTHESIA:  General with an interscalene nerve block. ESTIMATED BLOOD LOSS:  700 mL. COMPLICATIONS:  None. SPECIMENS REMOVED:  None. IMPLANTS:  synthes    DISPOSITION:  Stable to PACU. INDICATIONS FOR THE PROCEDURE:  The patient is a 49-year-old female who presented to my office 2 months into conservative treatment of a right humeral shaft fracture. She was found to have a nonunion on x-ray. She also had significant elbow stiffness as she had been kept in a coaptation splint for 8 weeks. We discussed treatment options for this nonunion. Risks and benefits of open reduction internal fixation versus humeral nail placement were explained. We discussed the risk of continued nonunion, blood loss, anesthesia risk, neurovascular injury specifically of the radial nerve, and risks secondary to the patient comorbidities including very low platelet levels. Informed consent was obtained. We discussed risk of wristdrop and arm dysfunction despite this procedure. We also discussed the risk of continued elbow stiffness and pain. We planned for the procedure as we medically optimized the patient and obtained clearance from her multiple specialists. She was ready for surgery on 2018.   History and physical informed consent forms were confirmed in the chart in the preoperative holding area. Her operative extremity was marked by orthopedics as she was given a nerve block by anesthesia. OPERATION:  The patient was taken to the OR and transferred to the operating room table. She was placed in a supine position and all prominences were carefully padded. She was given sedation by anesthesia and intubated. After sterile prepping and draping, a timeout was called. The patient was identified by name, date of birth, operative site, and operative procedure. After all were in agreement that the right arm was the operative extremity, we assessed the fracture and determined that we would be unable to obtain a closed reduction based on the fibrous scar tissue present. I also had concerns that a humeral nail would not allow for healing of the fracture as she likely has significant scar tissue and fibrous tissue formed at the fracture site. Therefore, we made an incision for an anterolateral approach that spanned from the proximal humerus down to the elbow. Careful dissection down to avoid neurovascular structures was performed. She did have areas of hypervascularity related to her fracture and attempts at healing. We identified the biceps muscle and incised the fascia. We  the layer between the quadriceps and the brachialis. A small incision was made in the brachialis muscle and I took care to split this muscle longitudinally. We did our best to avoid neurovascular structures. We kept the radial nerve lateral to our approach. We gained access to the fracture site and used a rongeur, rasp, and curette to clear fibrous tissues from the fracture site that was still mobile. There was significant fibrous tissue interposed at the fracture site and adherent to both ends of the fracture. This explained her nonunion.   We spent a significant amount of time exposing the fracture site and debriding it to where it would be stable for adequate healing. We also used a Diez elevator to pass distally to the anterior elbow for a capsular release. Likely due to the patient's platelet dysfunction, she did have some bleeding during this procedure. We tied off any concerning small vessels as we made our approach and fixed our fracture. We used x-rays to confirm a near anatomic reduction of our fracture site after a complete debridement. We then placed cancellous bone chips at the fracture site. This will allow for adequate overall healing. We were able to obtain small pieces of autograft cancellous bone as well that was placed at the fracture site. Once we obtained an anatomic reduction and confirmed that our radial nerve was not trapped in the fracture site and we did not grasp the radial nerve posteriorly, we placed 2 lag screws; 3.5 mm lag screws were placed from lateral to medial.  We used a lag by technique to place these screws. We then chose an 11-hole Synthes 4.5 mm narrow LCP plate. We placed combination of cortical screws and locking screws proximal and distal to the fracture. We were able to obtain 3 screws distal to the fracture, which was the furthest we could get with the plate without interrupting the elbow mechanics. We were able to get 4 screws proximally and placed our last most proximal screw at an angle to obtain fixation of the more proximal spiral fracture. Final x-rays were taken. We thoroughly irrigated the wound. After thorough irrigation, we closed with a combination of 0 Vicryl, 2-0 Vicryl, and staples. We then placed a drain prior to closure. This will be pulled on postoperative day number 1. Sterile dressings were applied. The patient was awoken by anesthesia. She was transferred to PACU in stable condition. We will keep her overnight for close monitoring and have her hospitalist assist us with stabilizing her postoperatively.   She will likely be discharged home on postoperative day number 1 with specific instructions regarding dressing changes,  activity restrictions, and followup information.       DO MLAINA Alvarez /   D: 06/06/2018 06:44     T: 06/06/2018 08:19  JOB #: [id number]

## 2018-06-06 NOTE — PROGRESS NOTES
Problem: Discharge Planning  Goal: *Discharge to safe environment  Outcome: Progressing Towards Goal  Disposition Needs: Anticipated plan is for patient to discharge home with family assistance. There are no current CM consults or needs. PT recommend outpatient rehab when cleared by Ortho. CM will continue to follow and assist with disposition needs as they arise. Mode of transport at time of discharge to be provided by patient's family.     FLOR Torres/MARGO  3:49 PM

## 2018-06-06 NOTE — PROGRESS NOTES
POD 1 Day Post-Op    Procedure:  Procedure(s):  ORIF OF RIGHT HUMERUS, CAPSULAR RELEASE RIGHT ELBOW    Subjective:     Patient doing well, complaining of appropriate post-op pain    Objective:     Blood pressure 129/67, pulse 88, temperature 97.5 °F (36.4 °C), resp. rate 16, height 5' 3\" (1.6 m), weight 102.1 kg (225 lb), SpO2 95 %. Temp (24hrs), Av.3 °F (36.3 °C), Min:96.2 °F (35.7 °C), Max:98 °F (36.7 °C)      Physical Exam:  Examination of the right humerus reveals that the incision is clean, dry and intact. Sensation is intact to light touch.  mild swelling. No calf pain.   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

## 2018-06-06 NOTE — PROGRESS NOTES
Occupational Therapy EVALUATION & discharge  Patient: Gt Ashford (5 y.o. 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 She currently requires up to [address] for LE ADLs, toileting ans functional mobility holding her 's hand while amb. Pt fell 10 weeks ago with resulting R humerus fx and now s/p sx. She demonstrated good safety awareness and has modified her clothing and home set-up for safety and independence since her fall. Further skilled acute occupational therapy is not indicated at this time. Pt's family will provide 24/7 assist for safety at discharge. Discharge Recommendations: Outpatient  Further Equipment Recommendations for Discharge: straight cane      SUBJECTIVE:   Patient stated I feel better.     OBJECTIVE DATA SUMMARY:   HISTORY:   Past Medical History:   Diagnosis Date    Anemia     Fall at home 04/29/2018    fracture to right shoulder    GERD (gastroesophageal reflux disease)     H/O bone density study 10/28/2016    Osteopenia    Hypermenorrhea     Hypertension     no current meds    Leiomyoma of uterus, unspecified     Liver cirrhosis secondary to ROBBINS ([address]     Menopausal syndrome     ROBBINS (nonalcoholic steatohepatitis)     Osteopenia 2016    Sleep apnea     cpap    Thrombocytopenia (HCC)     PLT NORM 60-70 PER DAUGHTER    Transitional cell bladder cancer ([address]     Has been cleared by urology     Past Surgical History:   Procedure Laterality Date    COLONOSCOPY N/A 3/28/2018    COL[facility]  3/28/2018         HX HYSTEROSCOPY  03/1996    w/ D&C    HX OTHER SURGICAL  02/19/10    tumor in bladder removed x2 in 2010    HX OTHER SURGICAL  03/25/10    Liver Biopsy--Cirrhosis    HX TUBAL LIGATION      HX UROLOGICAL      tumor removed from bladder    UPPER GI ENDOSCOPY,DIAGNOSIS  3/28/2018            Prior Level of Function/Environment/Context: Mod I with ADLs x10 weeks. Amb holding her 's arm  Home Situation  Home Environment: Private residence  # Steps to Enter: 3  Rails to Enter: Yes  Hand Rails : Bilateral  One/Two Story Residence: One story  Living Alone: No  Support Systems: Spouse/Significant Other/Partner, Child(jeannette), Family member(s)  Patient Expects to be Discharged to[de-identified] Private residence  Current DME Used/Available at Home: CPAP, Wheelchair, Raised toilet seat, Grab bars, Shower chair, Nebulizer  Tub or Shower Type: Shower    Hand dominance: Right    EXAMINATION OF PERFORMANCE DEFICITS:  Cognitive/Behavioral Status:  Neurologic State: Alert; Appropriate for age  Orientation Level: Oriented X4  Cognition: Appropriate decision making; Appropriate for age attention/concentration; Follows commands  Perception: Appears intact  Perseveration: No perseveration noted  Safety/Judgement: Awareness of environment; Fall prevention; Insight into deficits    Hearing: Auditory  Auditory Impairment: None  Hearing Aids/Status: Does not own    Vision/Perceptual:    Acuity: Within Defined Limits    Corrective Lenses: Glasses    Range of Motion:  AROM: Generally decreased, functional (IMP R shoulder and elbow s/p sx)  PROM: Generally decreased, functional (IMP R shoulder and elbow s/p sx)                      Strength:  Strength: Generally decreased, functional (IMP R shoulder and elbow s/p sx)              Coordination:  Coordination: Generally decreased, functional  Fine Motor Skills-Upper: Left Impaired;Right Impaired (tremors in BUEs- LUE jerking movements)    Gross Motor Skills-Upper: Left Intact; Right Impaired    Tone & Sensation:  Tone: Normal  Sensation: Intact                      Balance:  Sitting: Intact  Standing: Impaired  Standing - Static: Good  Standing - Dynamic : Fair    Functional Mobility and Transfers for ADLs:  Bed Mobility:  Supine to Sit: Supervision  Sit to Supine: Supervision  Scooting: Supervision    Transfers:  Sit to Stand: Stand-by assistance  Functional Transfers  Toilet Transfer : Contact guard assistance; Additional time (HHA)        ADL Assessment:  Feeding: Setup    Oral Facial Hygiene/Grooming: Contact guard assistance;Setup; Additional time (standing)    Bathing: Contact guard assistance    Upper Body Dressing: Minimum assistance    Lower Body Dressing: Contact guard assistance    Toileting: Contact guard assistance                ADL Intervention and task modifications:    Patient instructed and indicated Good understanding propping surgical arm on surface, can back away from arm in order to access axilla to wash, dry, and deodorize. Patient instructed and demonstrated dress R first/undress last with Supervision. Patient instructed and demonstrated compensatory strategies to don sling with Minimum assistance. Cognitive Retraining  Safety/Judgement: Awareness of environment; Fall prevention; Insight into deficits    Therapeutic Exercise:    EXERCISE   Sets   Reps   Active Active Assist   Passive   Comments   Shoulder Flexion 1 10 []                          [x]                          []                             Shoulder external rotation   (to neutral) 1 10 []                          [x]                          []                             Elbow flexion/extension 1 10 []                          []                          [x]                          Limited due to Ace wrap and edema   Wrist flexion/extension 1 10 [x]                          []                          []                             Finger flexion/extension 1 10 [x]                          [] []                             Pendulum    1 10 [x]     []     []          Functional Measure:  Barthel Index:    Bathin  Bladder: 10  Bowels: 10  Groomin  Dressin  Feedin  Mobility: 10  Stairs: 5  Toilet Use: 5  Transfer (Bed to Chair and Back): 10  Total: 60       Barthel and G-code impairment scale:  Percentage of impairment CH  0% CI  1-19% CJ  20-39% CK  40-59% CL  60-79% CM  80-99% CN  100%   Barthel Score 0-100 100 99-80 79-60 59-40 20-39 1-19   0   Barthel Score 0-20 20 17-19 13-16 9-12 5-8 1-4 0      The Barthel ADL Index: Guidelines  1. The index should be used as a record of what a patient does, not as a record of what a patient could do. 2. The main aim is to establish degree of independence from any help, physical or verbal, however minor and for whatever reason. 3. The need for supervision renders the patient not independent. 4. A patient's performance should be established using the best available evidence. Asking the patient, friends/relatives and nurses are the usual sources, but direct observation and common sense are also important. However direct testing is not needed. 5. Usually the patient's performance over the preceding 24-48 hours is important, but occasionally longer periods will be relevant. 6. Middle categories imply that the patient supplies over 50 per cent of the effort. 7. Use of aids to be independent is allowed. Cindy Snell, Barthel, D.W. (9122). Functional evaluation: the Barthel Index. [facility] (14)2. KEVAN DelacruzJMARTIN, Cruz Garcia., Josafat Wilcox., Estefanía, [address] [place] [address] (). Measuring the change indisability after inpatient rehabilitation; comparison of the responsiveness of the Barthel Index and Functional [place] Measure. Journal of Neurology, Neurosurgery, and Psychiatry, 66(4), 328-617.   Stanley Corbin N.J.LEO, GUY Armstrong, & Alfred Pop M.A. (2004.) Assessment of post-stroke quality of life in cost-effectiveness studies: The usefulness of the Barthel Index and the EuroQoL-5D. Quality of Life Research, 13, 359-38       G codes: In compliance with CMSs Claims Based Outcome Reporting, the following G-code set was chosen for this patient based on their primary functional limitation being treated: The outcome measure chosen to determine the severity of the functional limitation was the Barthel Index with a score of 60/100 which was correlated with the impairment scale. ? Self Care:     - CURRENT STATUS: CJ - 20%-39% impaired, limited or restricted    - GOAL STATUS: CJ - 20%-39% impaired, limited or restricted    - D/C STATUS:  CJ - 20%-39% impaired, limited or restricted     Occupational Therapy Evaluation Charge Determination   History Examination Decision-Making   LOW Complexity : Brief history review  LOW Complexity : 1-3 performance deficits relating to physical, cognitive , or psychosocial skils that result in activity limitations and / or participation restrictions  LOW Complexity : No comorbidities that affect functional and no verbal or physical assistance needed to complete eval tasks       Based on the above components, the patient evaluation is determined to be of the following complexity level: LOW   Pain:  Pain Scale 1: Numeric (0 - 10)  Pain Intensity 1: 3  Pain Location 1: Shoulder  Pain Orientation 1: Right  Pain Description 1: Aching  Pain Intervention(s) 1: Family Support;Distraction  Activity Tolerance:   Good  Please refer to the flowsheet for vital signs taken during this treatment. After treatment:   [x] Patient left in no apparent distress sitting up in chair  [] Patient left in no apparent distress in bed  [x] Call bell left within reach  [x] Nursing notified  [x] Caregiver present  [] Bed alarm activated    COMMUNICATION/EDUCATION:   Communication/Collaboration:  [x]      Home safety education was provided and the patient/caregiver indicated understanding.   [x]      Patient/family have participated as able and agree with findings and recommendations. []      Patient is unable to participate in plan of care at this time.   Findings and recommendations were discussed with: Physical Therapist and Registered Nurse    Beau Davalos OT  Time Calculation: 30 mins

## 2018-06-06 NOTE — PROGRESS NOTES
Bedside shift change report given to Nelida (oncoming nurse) by Taurus Smith (offgoing nurse). Report included the following information SBAR, Kardex, Intake/Output, MAR and Recent Results.

## 2018-06-06 NOTE — PROGRESS NOTES
Hospitalist Progress Note  Jan Del Rio MD  Answering service: [phone] from in house phone  Cell:       Date of Service:  2018  NAME:  Ev Miller  :  1950  MRN:  [id number]      Admission Summary:   Ev Miller is a 77 y.o. white female with past medical history of anemia, GERD, osteopenia, hypertension, liver cirrhosis, ROBBINS, thrombocytopenia, transitional cell bladder cancer, obesity, and sleep apnea presented for admission today with diagnosis of right shoulder humerus fracture. Today, patient underwent open reduction and internal fixation of right humerus fracture. Tonight, patient is seen in consultation per request of the attending surgical service for post-operative medical evaluation with multiple medical conditions including hypertension    Interval history / Subjective:         Feels fine   Discussion with very involved family bedside   Daughter gives good history . Assessment & Plan:      Right humeral fracture   S/p   1. Right humerus open reduction internal fixation with cancellous bone grafting. 2.  Right elbow capsular release and lysis of adhesions.       Bump in creatinine /JIM   Could be due to blood loss and also from increase in dose of lasix and aldactone by hepatology outpatient . Will start on albumin and also check bmp in am   donot give any iv fluid   No indication for blood transfusion    discussed with Dr Lopez Has on phone     hyperkalemia   No need for kayexelate   Albumin for kidney dysfunction       HTN   Anemia , post op blood loss anemia , no transfusion needed     Liver cirrhosis from ROBBINS   Dr Jamel Max patient     Thrombocytopenia   Watch     Body mass index is 39.86 kg/(m^2).   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 Vital Signs:    Last 24hrs VS reviewed since prior progress note. Most recent are:  Visit Vitals    /76    Pulse 97    Temp 97.1 °F (36.2 °C)    Resp 16    Ht 5' 3\" (1.6 m)    Wt 102.1 kg (225 lb)    SpO2 91%    Breastfeeding No    BMI 39.86 kg/m2         Intake/Output Summary (Last 24 hours) at 06/06/18 1706  Last data filed at 06/06/18 0839   Gross per 24 hour   Intake              140 ml   Output              200 ml   Net              -60 ml        Physical Examination:             Constitutional:  No acute distress, cooperative, pleasant    ENT:  Oral mucous moist, oropharynx benign. Neck supple,    Resp:  CTA bilaterally. No wheezing/rhonchi/rales. No accessory muscle use   CV:  Regular rhythm, normal rate, no murmurs, gallops, rubs    GI:  Soft, non distended, non tender. normoactive bowel sounds, no hepatosplenomegaly     Musculoskeletal:  drain in right humerus , post op     Neurologic:  Moves all extremities. AAOx3, CN II-XII reviewed            Data Review:    Review and/or order of clinical lab test      Labs:     Recent Labs      06/06/18   0348   WBC  10.8   HGB  10.1*   HCT  31.2*   PLT  68*     Recent Labs      06/06/18   0348   NA  134*   K  5.8*   CL  104   CO2  20*   BUN  30*   CREA  1.48*   GLU  172*   CA  9.5   MG  2.0     Recent Labs      06/06/18   0348   SGOT  47*   ALT  32   AP  149*   TBILI  3.4*   TP  6.3*   ALB  2.5*   GLOB  3.8     No results for input(s): INR, PTP, APTT in the last 72 hours. No lab exists for component: INREXT   No results for input(s): FE, TIBC, PSAT, FERR in the last 72 hours.    Lab Results   Component Value Date/Time    Folate 12.7 03/26/2018 01:40 PM      No results for input(s): PH, PCO2, PO2 in the last 72 hours. No results for input(s): CPK, CKNDX, TROIQ in the last 72 hours.     No lab exists for component: CPKMB  No results found for: CHOL, CHOLX, CHLST, CHOLV, HDL, LDL, LDLC, DLDLP, TGLX, TRIGL, TRIGP, CHHD, CHHDX  No results found for: [facility]  Lab Results   Component Value Date/Time    Color DARK YELLOW 03/25/2018 01:58 PM    Appearance CLOUDY (A) 03/25/2018 01:58 PM    Specific gravity 1.018 03/25/2018 01:58 PM    pH (UA) 5.0 03/25/2018 01:58 PM    Protein NEGATIVE  03/25/2018 01:58 PM    Glucose NEGATIVE  03/25/2018 01:58 PM    Ketone NEGATIVE  03/25/2018 01:58 PM    Bilirubin NEGATIVE  03/25/2018 01:58 PM    Urobilinogen 0.2 03/25/2018 01:58 PM    Nitrites NEGATIVE  03/25/2018 01:58 PM    Leukocyte Esterase NEGATIVE  03/25/2018 01:58 PM    Epithelial cells FEW 03/25/2018 01:58 PM    Bacteria NEGATIVE  03/25/2018 01:58 PM    WBC 0-4 03/25/2018 01:58 PM    RBC 0-5 03/25/2018 01:58 PM         Medications Reviewed:     Current Facility-Administered Medications   Medication Dose Route Frequency    albumin human 25% (BUMINATE) solution 12.5 g  12.5 g IntraVENous Q6H    [START ON 6/7/2018] famotidine (PEPCID) tablet 20 mg  20 mg Oral DAILY    sodium chloride (NS) flush 5-10 mL  5-10 mL IntraVENous Q8H    sodium chloride (NS) flush 5-10 mL  5-10 mL IntraVENous PRN    oxyCODONE IR (ROXICODONE) tablet 5 mg  5 mg Oral Q3H PRN    oxyCODONE IR (ROXICODONE) tablet 10 mg  10 mg Oral Q3H PRN    morphine (pf) (DURAMORPH) 1 mg/mL injection 2 mg  2 mg IntraVENous Q3H PRN    naloxone (NARCAN) injection 0.4 mg  0.4 mg IntraVENous PRN    ondansetron (ZOFRAN) injection 4 mg  4 mg IntraVENous Q4H PRN    prochlorperazine (COMPAZINE) with saline injection 5 mg  5 mg IntraVENous Q6H PRN    diphenhydrAMINE (BENADRYL) 12.5 mg/5 mL oral elixir 12.5 mg  12.5 mg Oral Q6H PRN    senna-docusate (PERICOLACE) 8.6-50 mg per tablet 1 Tab  1 Tab Oral BID    polyethylene glycol (MIRALAX) packet 17 g  17 g Oral DAILY    lactulose (CHRONULAC) solution 30 g  30 g Oral TID     ______________________________________________________________________  EXPECTED LENGTH OF STAY: - - -  ACTUAL LENGTH OF STAY:          Charan Phipps MD

## 2018-06-06 NOTE — PROGRESS NOTES
physical Therapy EVALUATION/DISCHARGE  Patient: Michael Jolley (41 y.o. 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 Prior to admission, she was walking with a hemiwalker and reports it felt bulky and cumbersome. She was able to ambulate a short distance in the room with a straight cane with CGA, but limited by pain in her arm. Discussed safety considerations with family and she was positioned in bed with pillow support and ice. Educated family about positioning methods and use of ice at home. She does have family support at home, as well. PLAN :  Discharge Recommendations: None, outpatient when cleared by ortho  Further Equipment Recommendations for Discharge: recommend a straight cane which family can provide     SUBJECTIVE:   Patient stated I really need some pain medicine right now, but I can do a little bit.     OBJECTIVE DATA SUMMARY:   HISTORY:    Past Medical History:   Diagnosis Date    Anemia     Fall at home 04/29/2018    fracture to right shoulder    GERD (gastroesophageal reflux disease)     H/O bone density study 10/28/2016    Osteopenia    Hypermenorrhea     Hypertension     no current meds    Leiomyoma of uterus, unspecified     Liver cirrhosis secondary to ROBBINS ([address]     Menopausal syndrome     ROBBINS (nonalcoholic steatohepatitis)     Osteopenia 2016    Sleep apnea     cpap    Thrombocytopenia (HCC)     PLT NORM 60-70 PER DAUGHTER    Transitional cell bladder cancer ([address]     Has been cleared by urology     Past Surgical History:   Procedure Laterality Date    COLONOSCOPY N/A 3/28/2018 [facility]  3/28/2018         HX HYSTEROSCOPY  03/1996    w/ D&C    HX OTHER SURGICAL  02/19/10    tumor in bladder removed x2 in 2010    HX OTHER SURGICAL  03/25/10    Liver Biopsy--Cirrhosis    HX TUBAL LIGATION      HX UROLOGICAL      tumor removed from bladder    UPPER GI ENDOSCOPY,DIAGNOSIS  3/28/2018          Prior Level of Function/Home Situation: independent with hemiwalker or  assist, 1 level home with 3 SHRUTI  Personal factors and/or comorbidities impacting plan of care: R UE NWB, pain    Home Situation  Home Environment: Private residence  # Steps to Enter: 3  Rails to Enter: Yes  Hand Rails : Bilateral  One/Two Story Residence: One story  Living Alone: No  Support Systems: Spouse/Significant Other/Partner, Child(jeannette), Family member(s)  Patient Expects to be Discharged to[de-identified] Private residence  Current DME Used/Available at Home: CPAP, Wheelchair, Raised toilet seat, Grab bars, Shower chair, Nebulizer  Tub or Shower Type: Shower    EXAMINATION/PRESENTATION/DECISION MAKING:   Critical Behavior:  Neurologic State: Alert, Appropriate for age  Orientation Level: Oriented X4  Cognition: Appropriate decision making, Appropriate for age attention/concentration, Follows commands  Safety/Judgement: Awareness of environment, Fall prevention, Insight into deficits  Hearing:   Auditory  Auditory Impairment: None  Hearing Aids/Status: Does not own  Skin:  Dressing and hemovac in place  Edema: considerable R hand edema  Range Of Motion:  AROM: Generally decreased, functional (IMP R shoulder and elbow s/p sx)           PROM: Generally decreased, functional (IMP R shoulder and elbow s/p sx)           Strength:    Strength: Generally decreased, functional (IMP R shoulder and elbow s/p sx)                    Tone & Sensation:   Tone: Normal              Sensation: Intact               Coordination:  Coordination: Generally decreased, functional  Vision:   Acuity: Within Defined Limits  Corrective Lenses: Glasses  Functional Mobility:  Bed Mobility:     Supine to Sit: Supervision  Sit to Supine: Supervision  Scooting: Supervision  Transfers:  Sit to Stand: Stand-by assistance  Stand to Sit: Stand-by assistance                       Balance:   Sitting: Intact  Standing: Impaired  Standing - Static: Good  Standing - Dynamic : Fair  Ambulation/Gait Training:  Distance (ft): 12 Feet (ft) (due to increased arm pain at this time)  Assistive Device: Cane, straight  Ambulation - Level of Assistance: Contact guard assistance; Adaptive equipment; Additional time        Gait Abnormalities: Decreased step clearance; Path deviations;Trunk sway increased        Base of Support: Widened     Speed/Phylicia: Pace decreased (<100 feet/min)  Step Length: Left shortened;Right shortened        Interventions: Safety awareness training; Tactile cues; Verbal cues; Visual/Demos  Interventions: Safety awareness training; Tactile cues; Verbal cues; Visual/Demos             Stairs:     Stairs - Level of Assistance:  (deferred secondary to arm pain)         Therapeutic Exercises:   Reviewed AROM exercises for the R elbow and shoulder    Functional Measure:  Barthel Index:    Bathin  Bladder: 10  Bowels: 10  Groomin  Dressin  Feedin  Mobility: 10  Stairs: 5  Toilet Use: 5  Transfer (Bed to Chair and Back): 10  Total: 60       Barthel and G-code impairment scale:  Percentage of impairment CH  0% CI  1-19% CJ  20-39% CK  40-59% CL  60-79% CM  80-99% CN  100%   Barthel Score 0-100 100 99-80 79-60 59-40 20-39 1-19   0   Barthel Score 0-20 20 17-19 13-16 9-12 5-8 1-4 0      The Barthel ADL Index: Guidelines  1. The index should be used as a record of what a patient does, not as a record of what a patient could do. 2. The main aim is to establish degree of independence from any help, physical or verbal, however minor and for whatever reason.   3. The need for supervision renders the patient not independent. 4. A patient's performance should be established using the best available evidence. Asking the patient, friends/relatives and nurses are the usual sources, but direct observation and common sense are also important. However direct testing is not needed. 5. Usually the patient's performance over the preceding 24-48 hours is important, but occasionally longer periods will be relevant. 6. Middle categories imply that the patient supplies over 50 per cent of the effort. 7. Use of aids to be independent is allowed. Marzella Mis., Barthel, D.W. (5923). Functional evaluation: the Barthel Index. [address] [facility] (14)2. Michael Ann mt HEIDI Sifuentes, Abhishek Segura., Tommy Fagan., Estefanía, [address] [place] [address] (1999). Measuring the change indisability after inpatient rehabilitation; comparison of the responsiveness of the Barthel Index and Functional [place] Measure. Journal of Neurology, Neurosurgery, and Psychiatry, 66(4), 849-111. Merced Sharp, N.[place].A, GUY Armstrong, & Beverley Pompa MRENÉE. (2004.) Assessment of post-stroke quality of life in cost-effectiveness studies: The usefulness of the Barthel Index and the EuroQoL-5D. Quality of Life Research, 13, 763-96       G codes: In compliance with CMSs Claims Based Outcome Reporting, the following G-code set was chosen for this patient based on their primary functional limitation being treated: The outcome measure chosen to determine the severity of the functional limitation was the Barthel with a score of 60/100 which was correlated with the impairment scale.     ? Mobility - Walking and Moving Around:     - CURRENT STATUS: CJ - 20%-39% impaired, limited or restricted    - GOAL STATUS: CJ - 20%-39% impaired, limited or restricted    - D/C STATUS:  CJ - 20%-39% impaired, limited or restricted        Physical Therapy Evaluation Charge Determination   History Examination Presentation Decision-Making   MEDIUM  Complexity : 1-2 comorbidities / personal factors will impact the outcome/ POC  MEDIUM Complexity : 3 Standardized tests and measures addressing body structure, function, activity limitation and / or participation in recreation  LOW Complexity : Stable, uncomplicated  LOW Complexity : FOTO score of       Based on the above components, the patient evaluation is determined to be of the following complexity level: LOW     Pain:  Pain Scale 1: Numeric (0 - 10)  Pain Intensity 1: 5  Pain Location 1: Shoulder  Activity Tolerance:   Limited by pain at this time  Please refer to the flowsheet for vital signs taken during this treatment. After treatment:   []   Patient left in no apparent distress sitting up in chair  [x]   Patient left in no apparent distress in bed  [x]   Call bell left within reach  [x]   Nursing notified  [x]   Caregiver present  []   Bed alarm activated    COMMUNICATION/EDUCATION:   Communication/Collaboration:  [x]   Fall prevention education was provided and the patient/caregiver indicated understanding. [x]   Patient/family have participated as able and agree with findings and recommendations. []   Patient is unable to participate in plan of care at this time.   Findings and recommendations were discussed with: Occupational Therapist and Registered Nurse    Thank you for this referral.  Ida Avila, PT   Time Calculation: 15 mins

## 2018-06-06 NOTE — CONSULTS
Medical Consultation Report    Patient:  Rosemarie Lombardo  MRN:  [id number]  YOB: 1950  Age:  77 y.o. Primary care provider:  BRISEYDA Bacon    Date of admission:  6/5/2018    Date of consultation:  6/5/2018    Requesting physician:  Dr. Enmanuel Kaur    Reason for consultation:  hypertension                                History of present illness  Rosemarie Lombardo is a 77 y.o. white female with past medical history of anemia, GERD, osteopenia, hypertension, liver cirrhosis, ROBBINS, thrombocytopenia, transitional cell bladder cancer, obesity, and sleep apnea presented for admission today with diagnosis of right shoulder humerus fracture. Today, patient underwent open reduction and internal fixation of right humerus fracture. Tonight, patient is seen in consultation per request of the attending surgical service for post-operative medical evaluation with multiple medical conditions including hypertension. I have reviewed patient's electronic medical records to obtain additional history. Today, systolic blood pressures have ranged from 158 - 806 and diastolic blood pressure ranged from 70 - 44. Patient had been as tachycardic as HR = 114 but HR has since decreased. She was last hospitalized from 3/26/2018 - 2/28/2018 with diagnosis of pancytopenia, anemia (with hemoglobin as low as 7.5 on 3/26/2018), thrombocytopenia (with platelets as low as 42 on 3/27/2018) , leukopenia, and dyspnea. Patient was transfused 2 units PRBCS and was given IV iron. CT guided bone marrow biopsy performed on 3/27/2018 showed variably normocellular marrow with trilineage hematopoiesis. EGD and colonoscopy performed on 3/28/2018 showed diverticulosis, external hemorrhoids, and rectal polyp (with pathology consistent with tubular adenoma). Last hemoglobin was 13.5 and last platelet count was 87 on 5/23/2018.   Patient also is followed by hepatologist with diagnosis of ROBBINS, liver cirrhosis and hyperammonemia with last ammonia =113 on 5/23/2018. Patient is now on Lactulose for the same. Impression/Recomendations  1. Hypertension. Monitor BP closely. On Furosemide and Spironolactone only at home. May resume home medications in the am pending labs to check on renal function. 2. Anemia. Repeat CBC. 3. Thromobocytopenia. Plan as above. 4. Sleep apnea. Resume CPAP if patient tolerates. 5. GERD. On Omeprazole. 6. Morbid obesity. Would recommend weight loss, heart healthy diet, and lifestyle modification. 7. Liver cirrhosis with h/o ROBBINS. Check LFTs. 8. Hyperammonemia. Check ammonia level. Continue Lactulose. 9. Tachycardia. Resolved. 10.  DVT prophylaxis. As per surgical service. Thank you very much for allowing us to participate in the care of this pleasant patient. The hospitalist service will continue to follow the patient's medical progress along with you.      Tom Parisi MD     Hospitalist  Past Medical History:   Diagnosis Date    Anemia     Fall at home 04/29/2018    fracture to right shoulder    GERD (gastroesophageal reflux disease)     H/O bone density study 10/28/2016    Osteopenia    Hypermenorrhea     Hypertension     no current meds    Leiomyoma of uterus, unspecified     Liver cirrhosis secondary to ROBBINS ([address]     Menopausal syndrome     ROBBINS (nonalcoholic steatohepatitis)     Osteopenia 2016    Sleep apnea     cpap    Thrombocytopenia (HCC)     PLT NORM 60-70 PER DAUGHTER    Transitional cell bladder cancer ([address]     Has been cleared by urology      Past Surgical History:   Procedure Laterality Date    COLONOSCOPY N/A 3/28/2018    [facility]afisa [facility]  3/28/2018         HX HYSTEROSCOPY  03/1996    w/ D&C    HX OTHER SURGICAL  02/19/10    tumor in bladder removed x2 in 2010    HX OTHER SURGICAL  03/25/10    Liver Biopsy--Cirrhosis    HX TUBAL LIGATION      HX UROLOGICAL      tumor removed from bladder    UPPER GI ENDOSCOPY,DIAGNOSIS  3/28/2018             Prior to Admission medications    Medication Sig Start Date End Date Taking? Authorizing Provider   cpap machine kit by Does Not Apply route nightly. Yes Historical Provider   oxyCODONE IR (ROXICODONE) 5 mg immediate release tablet Take 1 Tab by mouth every four (4) hours as needed for Pain. Max Daily Amount: 30 mg. 6/5/18  Yes Santos Pritchett,    furosemide (LASIX) 80 mg tablet Take 80 mg by mouth daily. Yes Historical Provider   lactulose (CHRONULAC) 10 gram/15 mL solution Take 45 mL by mouth three (3) times daily. Indications: Hepatic Encephalopathy 5/23/18  Yes Tammi Low NP   spironolactone (ALDACTONE) 100 mg tablet Take 1 Tab by mouth daily. Indications: Edema  Patient taking differently: Take 200 mg by mouth daily. Indications: Edema 4/20/18  Yes Tammi Low NP   omeprazole (PRILOSEC) 20 mg capsule Take 20 mg by mouth daily. 11/25/17  Yes Historical Provider   ondansetron (ZOFRAN ODT) 4 mg disintegrating tablet Take 4 mg by mouth every eight (8) hours as needed for Nausea. Historical Provider   albuterol (PROVENTIL VENTOLIN) 2.5 mg /3 mL (0.083 %) nebulizer solution 2.5 mg by Nebulization route every four (4) hours as needed for Wheezing.     Phys Ej, MD     Current Facility-Administered Medications   Medication Dose Route Frequency    sodium chloride (NS) flush 5-10 mL  5-10 mL IntraVENous Q8H    sodium chloride (NS) flush 5-10 mL  5-10 mL IntraVENous PRN    oxyCODONE IR (ROXICODONE) tablet 5 mg  5 mg Oral Q3H PRN    oxyCODONE IR (ROXICODONE) tablet 10 mg  10 mg Oral Q3H PRN    morphine (pf) (DURAMORPH) 1 mg/mL injection 2 mg  2 mg IntraVENous Q3H PRN    ceFAZolin (ANCEF) 2 g/20 mL in sterile water IV syringe  2 g IntraVENous Q8H    naloxone (NARCAN) injection 0.4 mg  0.4 mg IntraVENous PRN    ondansetron (ZOFRAN) injection 4 mg  4 mg IntraVENous Q4H PRN    prochlorperazine (COMPAZINE) with saline injection 5 mg  5 mg IntraVENous Q6H PRN    diphenhydrAMINE (BENADRYL) 12.5 mg/5 mL oral elixir 12.5 mg  12.5 mg Oral Q6H PRN    famotidine (PEPCID) tablet 20 mg  20 mg Oral BID    senna-docusate (PERICOLACE) 8.6-50 mg per tablet 1 Tab  1 Tab Oral BID    [START ON 6/6/2018] polyethylene glycol (MIRALAX) packet 17 g  17 g Oral DAILY    lactated Ringers infusion  75 mL/hr IntraVENous CONTINUOUS     Allergies   Allergen Reactions    Adhesive Tape-Silicones Rash      Family History   Problem Relation Age of Onset    Diabetes Brother      Type II    Hypertension Brother     Heart Disease Father     Heart Attack Father 64    Hypertension Father     Hypertension Mother     Osteoporosis Mother      +Hip fracture    Hypertension Sister     Stroke Sister     Anesth Problems Neg Hx         Social history  Living situation  x  Independent               Ambulates  x  Independently                 Alcohol history  x  None             History   Smoking Status    Never Smoker   Smokeless Tobacco    Never Used       Review of systems  Pertinent positives as per HPI. All other systems were reviewed and are negative. Physical Examination   Visit Vitals    /70    Pulse 98    Temp 96.2 °F (35.7 °C)    Resp 16    Ht 5' 3\" (1.6 m)    Wt 102.1 kg (225 lb)    SpO2 95%    BMI 39.86 kg/m2       O2 Flow Rate (L/min): 3 l/min   O2 Device: Room air    General:  Obese female in no acute respiratory distress   Head:  Atraumatic   Eyes:  Conjunctivae/corneas clear. PERRLA. EOMI.   E/N/M/T: Nares patent. Clear oropharynx. Tongue midline and non-edematous. Neck: No JVD. Trachea midline. No carotid bruits. No thyromegaly. No stridor.    Lungs:   Symmetrical chest expansion and respiratory effort  Clear to auscultation bilaterally   Chest wall:  No tenderness or deformity   Heart:  Regular rate and rhythm   Sounds normal; no murmur, click, rub or gallop   Abdomen:   Soft, no tenderness  No rebound, guarding, or rigidity  Non-distended  Bowel sounds normal  No masses or hepatosplenomegaly  No hernias present   Back: No CVA tenderness   Extremities: Marked non-pitting edema BLE     Pulses 2+ radial/ 1+ DP bilateral pulses   Skin: No rashes or ulcers  Warm/ dry   Musculo-      skeletal: Gait not tested  No calf tenderness   Neuro: GCS 15. Moves all extremities x 4. No slurred speech. No facial droop. Sensation grossly intact.     Psych: Alert, oriented x 3           Data Review    12 lead EKG on 5/15/2018: Sinus tachycardia at 114 bpm.    Imaging  CBC reviewed from 5/23/2018

## 2018-06-07 NOTE — PROGRESS NOTES
Hospitalist Progress Note  Gabrielle Avila MD  Answering service: [phone] OR 36 from in house phone        Date of Service:  2018  NAME:  Clarence Dickens  :  1950  MRN:  [id number]      Admission Summary:   Clarence Dickens is a 77 y.o. white female with past medical history of anemia, GERD, osteopenia, hypertension, liver cirrhosis, ROBBINS, thrombocytopenia, transitional cell bladder cancer, obesity, and sleep apnea presented for admission today with diagnosis of right shoulder humerus fracture. Today, patient underwent open reduction and internal fixation of right humerus fracture. Tonight, patient is seen in consultation per request of the attending surgical service for post-operative medical evaluation with multiple medical conditions including hypertension    Interval history / Subjective:         Feels fine   Discussion with very involved family bedside     Would keep her another day and give albumin and can be discharged in am , need not have normal renal function in am an discharge to original doses of aldactone and lasix and not double doses as before   I discussed with Dr Elisa Garcia as curbsbetina      Assessment & Plan:      Right humeral fracture   S/p   1. Right humerus open reduction internal fixation with cancellous bone grafting. 2.  Right elbow capsular release and lysis of adhesions.       Bump in creatinine /JIM   Could be due to blood loss and also from increase in dose of lasix and aldactone by hepatology outpatient .  Will start on albumin and renal function has improved   Would keep her another day and give albumin and can be discharged in am , need not have normal renal function in am an discharge to original doses of aldactone and lasix and not double doses as before   I discussed with Dr Elisa Garcia as rutbsbetina   donot give any iv fluid   No indication for blood transfusion    discussed with Dr Elisa Garcia on phone hyperkalemia   No need for kayexelate   Albumin for kidney dysfunction       HTN   Anemia , post op blood loss anemia , no transfusion needed     Liver cirrhosis from ROSENDO Suggs patient     Thrombocytopenia   Watch     Body mass index is 39.86 kg/(m^2). 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 Vital Signs:    Last 24hrs VS reviewed since prior progress note. Most recent are:  Visit Vitals    /69 (BP 1 Location: Left arm, BP Patient Position: At rest)    Pulse 93    Temp 98 °F (36.7 °C)    Resp 16    Ht 5' 3\" (1.6 m)    Wt 102.1 kg (225 lb)    SpO2 97%    Breastfeeding No    BMI 39.86 kg/m2         Intake/Output Summary (Last 24 hours) at 06/07/18 1805  Last data filed at 06/07/18 8942   Gross per 24 hour   Intake              240 ml   Output               30 ml   Net              210 ml        Physical Examination:             Constitutional:  No acute distress, cooperative, pleasant    ENT:  Oral mucous moist, oropharynx benign. Neck supple,    Resp:  CTA bilaterally. No wheezing/rhonchi/rales. No accessory muscle use   CV:  Regular rhythm, normal rate, no murmurs, gallops, rubs    GI:  Soft, non distended, non tender. normoactive bowel sounds, no hepatosplenomegaly     Musculoskeletal:  drain in right humerus , post op     Neurologic:  Moves all extremities.   AAOx3, CN II-XII reviewed            Data Review:    Review and/or order of clinical lab test      Labs:     Recent Labs      06/06/18   0348   WBC  10.8 HGB  10.1*   HCT  31.2*   PLT  68*     Recent Labs      06/07/18   0428  06/06/18   0348   NA  135*  134*   K  5.4*  5.8*   CL  105  104   CO2  23  20*   BUN  32*  30*   CREA  1.39*  1.48*   GLU  144*  172*   CA  9.0  9.5   MG   --   2.0     Recent Labs      06/06/18   0348   SGOT  47*   ALT  32   AP  149*   TBILI  3.4*   TP  6.3*   ALB  2.5*   GLOB  3.8     No results for input(s): INR, PTP, APTT in the last 72 hours. No lab exists for component: INREXT, INREXT   No results for input(s): FE, TIBC, PSAT, FERR in the last 72 hours. Lab Results   Component Value Date/Time    Folate 12.7 03/26/2018 01:40 PM      No results for input(s): PH, PCO2, PO2 in the last 72 hours. No results for input(s): CPK, CKNDX, TROIQ in the last 72 hours.     No lab exists for component: CPKMB  No results found for: CHOL, CHOLX, CHLST, CHOLV, HDL, LDL, LDLC, DLDLP, TGLX, TRIGL, TRIGP, CHHD, CHHDX  No results found for: [facility]  Lab Results   Component Value Date/Time    Color DARK YELLOW 03/25/2018 01:58 PM    Appearance CLOUDY (A) 03/25/2018 01:58 PM    Specific gravity 1.018 03/25/2018 01:58 PM    pH (UA) 5.0 03/25/2018 01:58 PM    Protein NEGATIVE  03/25/2018 01:58 PM    Glucose NEGATIVE  03/25/2018 01:58 PM    Ketone NEGATIVE  03/25/2018 01:58 PM    Bilirubin NEGATIVE  03/25/2018 01:58 PM    Urobilinogen 0.2 03/25/2018 01:58 PM    Nitrites NEGATIVE  03/25/2018 01:58 PM    Leukocyte Esterase NEGATIVE  03/25/2018 01:58 PM    Epithelial cells FEW 03/25/2018 01:58 PM    Bacteria NEGATIVE  03/25/2018 01:58 PM    WBC 0-4 03/25/2018 01:58 PM    RBC 0-5 03/25/2018 01:58 PM         Medications Reviewed:     Current Facility-Administered Medications   Medication Dose Route Frequency    albumin human 25% (BUMINATE) solution 12.5 g  12.5 g IntraVENous Q6H    famotidine (PEPCID) tablet 20 mg  20 mg Oral DAILY    sodium chloride (NS) flush 5-10 mL  5-10 mL IntraVENous Q8H    sodium chloride (NS) flush 5-10 mL  5-10 mL IntraVENous PRN    oxyCODONE IR (ROXICODONE) tablet 5 mg  5 mg Oral Q3H PRN    oxyCODONE IR (ROXICODONE) tablet 10 mg  10 mg Oral Q3H PRN    naloxone (NARCAN) injection 0.4 mg  0.4 mg IntraVENous PRN    senna-docusate (PERICOLACE) 8.6-50 mg per tablet 1 Tab  1 Tab Oral BID    lactulose (CHRONULAC) solution 30 g  30 g Oral TID     ______________________________________________________________________  EXPECTED LENGTH OF STAY: - - -  ACTUAL LENGTH OF STAY:          0                 Gabrielle Avila MD

## 2018-06-07 NOTE — PROGRESS NOTES
Bedside and Verbal shift change report given to Leslie Allen RN (oncoming nurse) by Tona Gomes RN (offgoing nurse). Report included the following information SBAR, Kardex, Intake/Output, MAR and Recent Results.

## 2018-06-07 NOTE — PROGRESS NOTES
Bedside shift change report given to Goyo (oncoming nurse) by Javy Ying (offgoing nurse). Report included the following information SBAR, Kardex, Intake/Output, MAR and Recent Results.

## 2018-06-07 NOTE — PROGRESS NOTES
8:49 PM  Bedside and Verbal shift change report given to Daniel Price RN (oncoming nurse) by Clement Mcarthur RN (offgoing nurse). Report included the following information SBAR, Kardex, Intake/Output, MAR and Recent Results.

## 2018-06-07 NOTE — PROGRESS NOTES
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 No complaints. Pain tolerable    Review of Systems: A review of systems was negative. Objective:     PT/OT:     PATIENT MOBILITY    Bed Mobility  Supine to Sit: Supervision  Sit to Supine: Supervision  Scooting: Supervision  Transfers  Sit to Stand: Stand-by assistance  Stand to Sit: Stand-by assistance      Gait  Base of Support: Widened  Speed/Phylicia: Pace decreased (<100 feet/min)  Step Length: Left shortened, Right shortened  Gait Abnormalities: Decreased step clearance, Path deviations, Trunk sway increased  Ambulation - Level of Assistance: Contact guard assistance, Adaptive equipment, Additional time  Distance (ft): 12 Feet (ft) (due to increased arm pain at this time)  Assistive Device: Cane, straight  Stairs - Level of Assistance:  (deferred secondary to arm pain)  Interventions: Safety awareness training, Tactile cues, Verbal cues, Visual/Demos            Vital Signs:    Blood pressure 152/82, pulse 94, temperature 97.9 °F (36.6 °C), resp. rate 16, height 5' 3\" (1.6 m), weight 102.1 kg (225 lb), SpO2 96 %, not currently breastfeeding.   Temp (24hrs), Av.8 °F (36.6 °C), Min:97.1 °F (36.2 °C), Max:98.3 °F (36.8 °C)      Pain Control:   Pain Assessment  Pain Scale 1: Numeric (0 - 10)  Pain Intensity 1: 3  Pain Onset 1: post op  Pain Location 1: Shoulder  Pain Orientation 1: Right  Pain Description 1: Aching  Pain Intervention(s) 1: Medication (see MAR), Family Support, Rest    Meds:  Current Facility-Administered Medications   Medication Dose Route Frequency    albumin human 25% (BUMINATE) solution 12.5 g  12.5 g IntraVENous Q6H    famotidine (PEPCID) tablet 20 mg  20 mg Oral DAILY    sodium chloride (NS) flush 5-10 mL  5-10 mL IntraVENous Q8H    sodium chloride (NS) flush 5-10 mL  5-10 mL IntraVENous PRN    oxyCODONE IR (ROXICODONE) tablet 5 mg  5 mg Oral Q3H PRN    oxyCODONE IR (ROXICODONE) tablet 10 mg  10 mg Oral Q3H PRN    naloxone (NARCAN) injection 0.4 mg  0.4 mg IntraVENous PRN    senna-docusate (PERICOLACE) 8.6-50 mg per tablet 1 Tab  1 Tab Oral BID    polyethylene glycol (MIRALAX) packet 17 g  17 g Oral DAILY    lactulose (CHRONULAC) solution 30 g  30 g Oral TID        LAB:    Lab Results   Component Value Date/Time    INR 1.4 (H) 05/23/2018 12:00 AM    INR 1.5 (H) 04/20/2018 11:58 AM    INR 1.8 (H) 03/28/2018 03:01 AM     Lab Results   Component Value Date/Time    HGB 10.1 (L) 06/06/2018 03:48 AM    HGB 13.5 05/23/2018 12:00 AM    HGB 10.9 (L) 04/20/2018 11:58 AM       Wound Leg Lower Left (Active)   Number of days:173       Wound Arm Right (Active)   DRESSING STATUS Clean, dry, and intact 6/6/2018  8:00 PM   DRESSING TYPE 4 x 4;Elastic bandage 6/6/2018  8:00 PM   SPLINT TYPE/MATERIAL Sling 6/6/2018  8:00 PM   Incision site well approximated? Yes 6/6/2018  8:00 PM   Drainage Amount  None 6/6/2018  8:00 PM   Wound Odor None 6/6/2018  8:00 PM   Number of days:2             Physical Exam:  General:     Alert and oriented. No acute distress. Chest:     Respirations unlabored. Abdomen:     Extremities:   Soft, non-tender. No evidence of cyanosis. Pulses palpable in both upper and lower extremities. Stephenie's sign negative in bilateral lower extremities. Moving all extremities. Neurologic:  No motor deficits. Sensation stable. Neurovascular exam within normal limits. Incision:   Dressing clean, dry, and intact. No significant erythema or swelling. No active drainage noted. HVAC in place.         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   Patient will be discharged home once cleared by medical team.      Patient Expects to be Discharged to[de-identified] Private residence     Signed By: Jimi Mathur DO

## 2018-06-07 NOTE — PROGRESS NOTES
8:38 AM  CM notified that patient has orders for discharge. There are no CM consults or needs at this time. Patient to discharge home with family assistance and outpatient therapy when cleared by Ortho. Mode of transport at time of discharge to be provided by patient's family. 12:10 PM  CM informed patient to discharge home tomorrow. Labs need to be checked in the morning. CM will continue to follow and assist with disposition needs as they arise.     FLOR Burgess/MARGO

## 2018-06-08 NOTE — PROGRESS NOTES
I have reviewed discharge instructions with the patient. The patient verbalized understanding. Reviewed prescriptions, signs and symptoms to report and gave opportunity for questions. Patient left via wheelchair with PCT assistance and driven home by .

## 2018-06-08 NOTE — DISCHARGE INSTRUCTIONS
Follow up appointments  Call Deloris Koehler at [phone] to schedule follow up appt with Dr. Sanna Cabello in  10 - 14 days. When to call your Orthopaedic Surgeon:  -unrelieved pain  -Signs of infection-if your incision is red; continues to have drainage; drainage has a foul odor or if you have a persistent fever over 101 degrees  -Signs of a blood clot in your leg-calf pain, tenderness, redness, swelling of lower leg  When to call your Primary Care Physician:  -Concerns about medical conditions such as diabetes, high blood pressure, asthma, congestive heart failure  -Call if blood sugars are elevated, persistent headache or dizziness, coughing or congestion, constipation or diarrhea, burning with urination, abnormal heart rate  When to call 331and go to the nearest emergency room  -acute onset of chest pain, shortness of breath, difficulty breathing    Activity - no weight bearing with right arm. May perform shoulder, elbow and wrist Range of motion as tolerated    Incision Care - keep dressing clean and dry,  May remove and shower in 4 days. Preventing blood clots - ambulate daily      Pain management  -take pain medication as prescribed; decrease the amount you use as your pain lessens  -avoid alcoholic beverages while taking pain medication  -Please be aware that many medications contain Tylenol. We do not want you to over medicate so please read the information below as a guide. Do not take more than 4 Grams of Tylenol in a 24 hour period. (There are 1000 milligrams in one Gram)  Percocet contains 325 mg of Tylenol per tablet (do not take more than 12 tablets in 24 hours)  Lortab contains 500 mg of Tylenol per tablet (do not take more than 8 tablets in 24 hours)  Norco contains 325 mg of Tylenol per tablet (do not take more than 12 tablets in 24 hours).   -You may place an ice bag on your affected extremity     Diet  -resume usual diet; drink plenty of fluids; eat foods high in fiber  -you may want to take a stool softener (such as Senokot-S or Colace) to prevent constipation while you are taking pain medication.   If constipation occurs, take a laxative (such as Dulcolax tablets, Milk of Magnesia, or a suppository)

## 2018-06-08 NOTE — PROGRESS NOTES
Patient to be discharged late this afternoon. Patient spoke with Dr. René Chase and per the patient, Dr. René Chase wants the patient to receive another dose of lactulose and Albumin this evening before being discharged. I called and spoke with hosptialist Dr. Dayanna Marroquin and she ok'd to discharge after the meds given this evening.

## 2018-06-08 NOTE — PROGRESS NOTES
Bedside shift change report given to Luis Corral (oncoming nurse) by Luwana Buerger (offgoing nurse). Report included the following information SBAR, Kardex, Procedure Summary, Intake/Output and MAR.

## 2018-06-08 NOTE — PROGRESS NOTES
POD 3 Days Post-Op    Procedure:  Procedure(s):  ORIF OF RIGHT HUMERUS, CAPSULAR RELEASE RIGHT ELBOW    Subjective:     Patient doing well, complaining of appropriate post-op pain. Cleared by medicine for d/c    Objective:     Blood pressure 149/74, pulse (!) 108, temperature 98.2 °F (36.8 °C), resp. rate 16, height 5' 3\" (1.6 m), weight 102.1 kg (225 lb), SpO2 94 %, not currently breastfeeding. Temp (24hrs), Av.1 °F (36.7 °C), Min:98.1 °F (36.7 °C), Max:98.2 °F (36.8 °C)      Physical Exam:  Examination of the right humerus reveals that the incision is clean, dry and intact. Sensation is intact to light touch.  mild swelling. No calf pain. 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   lisa MELARA  - dvt prophylaxis - scds  - d/c planning - home today after dose of albumin        Monica James DO

## 2018-06-08 NOTE — PROGRESS NOTES
Bedside shift change report given to Lisa Chaudhari RN (oncoming nurse) by Adan Velazquez (offgoing nurse). Report included the following information SBAR, Kardex, Intake/Output, MAR and Recent Results.

## 2018-06-08 NOTE — PROGRESS NOTES
Hospitalist Progress Note  Pablo Keyes MD  Answering service: [phone] from in house phone        Date of Service:  2018  NAME:  Garrison Gonzalez  :  1950  MRN:  [id number]      Admission Summary:   Garrison Gonzalez is a 77 y.o. white female with past medical history of anemia, GERD, osteopenia, hypertension, liver cirrhosis, ROBBINS, thrombocytopenia, transitional cell bladder cancer, obesity, and sleep apnea presented for admission today with diagnosis of right shoulder humerus fracture. Today, patient underwent open reduction and internal fixation of right humerus fracture. Tonight, patient is seen in consultation per request of the attending surgical service for post-operative medical evaluation with multiple medical conditions including hypertension    Interval history / Subjective:         Feels fine   Discussion with very involved family bedside     Looks like Dr. Shell Gamboa saw pt. this morning and as per patient she is going to follow in hepatology clinic next week. Assessment & Plan:      Right humeral fracture   S/p   1. Right humerus open reduction internal fixation with cancellous bone grafting. 2.  Right elbow capsular release and lysis of adhesions.       Bump in creatinine /JIM   Could be due to blood loss and also from increase in dose of lasix and aldactone by hepatology outpatient . With albumin and renal function has improved   Ok to DC from medical, do not resume lasix and aldactone on DC. She will follow up with Hepatology some time next week, with BMP and will restart as OP. Looks like Dr. Sandra Pena discussed with Dr Shell Gamboa as curbside     Hyperkalemia, resolved  Monitor. Albumin for kidney dysfunction       HTN   Anemia , post op blood loss anemia , no transfusion needed     Liver cirrhosis from ROBBINS   Dr Clau Herr patient     Thrombocytopenia   Watch     Body mass index is 39.86 kg/(m^2).   Obesity Sleep apnea       Code status:full   DVT prophylaxis: scd    Care Plan discussed with: Patient/Family  Disposition: TBD. OK to DC from medical.     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 Vital Signs:    Last 24hrs VS reviewed since prior progress note. Most recent are:  Visit Vitals    /74 (BP 1 Location: Left arm)    Pulse (!) 108    Temp 98.2 °F (36.8 °C)    Resp 16    Ht 5' 3\" (1.6 m)    Wt 102.1 kg (225 lb)    SpO2 94%    Breastfeeding No    BMI 39.86 kg/m2       No intake or output data in the 24 hours ending 06/08/18 1143     Physical Examination:             Constitutional:  No acute distress, cooperative, pleasant    ENT:  Oral mucous moist, oropharynx benign. Neck supple,    Resp:  CTA bilaterally. No wheezing/rhonchi/rales. No accessory muscle use   CV:  Regular rhythm, normal rate, no murmurs, gallops, rubs    GI:  Soft, non distended, non tender. normoactive bowel sounds, no hepatosplenomegaly     Musculoskeletal:  drain in right humerus , post op     Neurologic:  Moves all extremities.   AAOx3, CN II-XII reviewed            Data Review:    Review and/or order of clinical lab test      Labs:     Recent Labs      06/06/18   0348   WBC  10.8   HGB  10.1*   HCT  31.2*   PLT  68*     Recent Labs      06/08/18   0415  06/07/18   0428  06/06/18   0348   NA  134*  135*  134*   K  4.8  5.4*  5.8*   CL  103  105  104   CO2  24  23  20*   BUN  31*  32*  30*   CREA  1.33*  1.39*  1.48*   GLU  143*  144*  172*   CA  8.9  9.0  9.5   MG   --    --   2.0     Recent Labs      06/06/18   0348   SGOT  47*   ALT  32   AP  149*   TBILI  3.4*   TP  6.3*   ALB  2.5* GLOB  3.8     No results for input(s): INR, PTP, APTT in the last 72 hours. No lab exists for component: INREXT, INREXT   No results for input(s): FE, TIBC, PSAT, FERR in the last 72 hours. Lab Results   Component Value Date/Time    Folate 12.7 03/26/2018 01:40 PM      No results for input(s): PH, PCO2, PO2 in the last 72 hours. No results for input(s): CPK, CKNDX, TROIQ in the last 72 hours.     No lab exists for component: CPKMB  No results found for: CHOL, CHOLX, CHLST, CHOLV, HDL, LDL, LDLC, DLDLP, TGLX, TRIGL, TRIGP, CHHD, CHHDX  No results found for: [facility]  Lab Results   Component Value Date/Time    Color DARK YELLOW 03/25/2018 01:58 PM    Appearance CLOUDY (A) 03/25/2018 01:58 PM    Specific gravity 1.018 03/25/2018 01:58 PM    pH (UA) 5.0 03/25/2018 01:58 PM    Protein NEGATIVE  03/25/2018 01:58 PM    Glucose NEGATIVE  03/25/2018 01:58 PM    Ketone NEGATIVE  03/25/2018 01:58 PM    Bilirubin NEGATIVE  03/25/2018 01:58 PM    Urobilinogen 0.2 03/25/2018 01:58 PM    Nitrites NEGATIVE  03/25/2018 01:58 PM    Leukocyte Esterase NEGATIVE  03/25/2018 01:58 PM    Epithelial cells FEW 03/25/2018 01:58 PM    Bacteria NEGATIVE  03/25/2018 01:58 PM    WBC 0-4 03/25/2018 01:58 PM    RBC 0-5 03/25/2018 01:58 PM         Medications Reviewed:     Current Facility-Administered Medications   Medication Dose Route Frequency    albumin human 25% (BUMINATE) solution 12.5 g  12.5 g IntraVENous Q6H    famotidine (PEPCID) tablet 20 mg  20 mg Oral DAILY    sodium chloride (NS) flush 5-10 mL  5-10 mL IntraVENous Q8H    sodium chloride (NS) flush 5-10 mL  5-10 mL IntraVENous PRN    oxyCODONE IR (ROXICODONE) tablet 5 mg  5 mg Oral Q3H PRN    oxyCODONE IR (ROXICODONE) tablet 10 mg  10 mg Oral Q3H PRN    naloxone (NARCAN) injection 0.4 mg  0.4 mg IntraVENous PRN    senna-docusate (PERICOLACE) 8.6-50 mg per tablet 1 Tab  1 Tab Oral BID    lactulose (CHRONULAC) solution 30 g  30 g Oral TID ______________________________________________________________________  EXPECTED LENGTH OF STAY: - - -  ACTUAL LENGTH OF STAY:          0                 Kvng Harrison MD

## 2018-06-08 NOTE — DISCHARGE SUMMARY
Ortho Discharge Summary      Patient ID:  Ev Miller  [id number]  77 y.o.  1950    Allergies: Adhesive tape-silicones    Admit date: 6/5/2018    Discharge date and time: No discharge date for patient encounter. 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 ICD-10-CM: S42.301K  ICD-9-CM: 733.82  6/5/2018 - Present Unknown        * (Principal)Hypertension ICD-10-CM: I10  ICD-9-CM: 401.9  Unknown - Present Yes              Surgeon: Christoph Lamas    Preoperative Medical Clearance: obtained    * Procedure: Procedure(s):  ORIF OF RIGHT HUMERUS, CAPSULAR RELEASE RIGHT ELBOW           Perioperative Antibiotics: Ancef     Postoperative Pain Management:  oxy    DVT Prophylaxis:  RICHARD Hose                                  Plexi-Pulse    Post Op complications: none    Hemoglobin at discharge: __10_    * Discharge Condition: improved  Wound appears to be healing without any evidence of infection. Physical Therapy started on the day following surgery and progressed to independent ambulation and had understanding of precautions needed following surgery.           * Discharged to: Home    * Follow-up Care/Discharge instructions:  - Anticoagulate with: ambulate daily  - Resume pre hospital diet            - Resume home medications per medical continuation form     - nwb RUE, shoulder and elbow rom as tolerated  - Follow up in office as scheduled       Signed:  Dea Ireland DO  6/8/2018  5:24 PM

## 2018-06-18 NOTE — PROGRESS NOTES
[address] [facility] at [facility]  [phone]    Hematology / Oncology Progress Note    Reason for Visit:   Garrison Gonzalez is a 77 y.o. female who comes in for follow up of anemia. Pathology:     Bone marrow biopsy 3/27/18:   Variably normocellular marrow with trilineage hematopoiesis. See comment. The bone marrow is variably normocellular for age to reveal trilineage hematopoiesis with adequate maturation. Megakaryocytes are normal in number and have a spectrum of maturation. No significant dysplasia is identified in any cell line. Blasts are not increased. Mild increase in polytypic plasma cells (approximately 5%) is observed. 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 In summary, there is no morphologic evidence of myelodysplastic syndrome. Cytogenetics and FISH analysis for MDS panel is pending and the results will be issued as an addendum. History of Present Illness:   Ms. Ted Hansen is a 78 y/o female with h/o bladder cancer, ROBBINS cirrhosis, h/o LLE DVT who comes in for follow up of anemia. I first met her in the hospital when she was admitted for cellulitis and DVT. No prior personal h/o or family h/o thrombosis. Regarding ROBBINS cirrhosis, the patient states she was diagnosed several years ago after having an episode of hematemesis, was found to have esophageal varices. Patient denies ever having banding for the varices or medication with Propranolol. Labs here revealed PLT range from 44 - 57. She endorses easy bruising, bleeding, but denies current/recent epistaxis, gingival bleeding. No melena/hematochezia, hematuria. She was then admitted to the hospital in 3/2018 after p/w SOB, GOLDMAN, fatigue, lower abdominal pain, new symptomatic anemia. On 3/25/18, her labs showed Hgb 7.7, stool hemoccult negative.  The patient's primary care PA (who is also her son-in-law) called stating that patient's Hgb was > 10 only weeks ago, and that patient's GOLDMAN, SOB were all quite new with patient having trouble walking across the room without significant shortness of breath. Therefore, she was admitted. Labs in ED notable for WBC 3.3 with ANC 1.5, Hgb 7.5, PLT 67. The patient and her daughter report that she was seen by primary care 2 weeks ago given increased abdominal distention and pain. Reportedly a CXR showed possible pulmonary edema. The Lasix dose was increased from 40 to 80mg with some improvement in symptoms per patient. No melena/hematochezia/hematemesis. During this admission, patient was treated with parenteral iron and underwent bone marrow biopsy which was negative for MDS or other pathology. She was discharged on both Spironolactone and Lasix. Today, she comes in for follow up of the anemia. States she is doing well. Since the last visit, the patient suffered a fall on her deck resulting in R humerus fracture. Due to lack of healing, she underwent ORIF of R shoulder on 6/5/18. Labs on 6/14/18 show: WBC 5.7, Hgb 9.2, MCV 99, PLT 84, AST 57 H, ALT 29,  H, total bili 4.2 H, direct bili 1 H, Cr 1.2, Ferritin 114.1, Iron Sat 64%. Denies any sob, chest pain, constipation, dark stools/bloody stools, blood in urine. She also met with Dr. Yinka Olson in Hepatology and had diuretics changed. He also saw her during hospitalization for shoulder surgery and treated patient with IV albumin.      Past Medical History:   Diagnosis Date    Anemia     Fall at home 04/29/2018    fracture to right shoulder    GERD (gastroesophageal reflux disease)     H/O bone density study 10/28/2016    Osteopenia    Hypermenorrhea     Hypertension     no current meds    Leiomyoma of uterus, unspecified     Liver cirrhosis secondary to ROBBINS ([facility]     Menopausal syndrome     ROBBINS (nonalcoholic steatohepatitis)     Osteopenia 2016    Sleep apnea     cpap    Thrombocytopenia (HCC)     PLT NORM 60-70 PER DAUGHTER    Transitional cell bladder cancer [facility])     Has been cleared by urology      Past Surgical History:   Procedure Laterality Date    COLONOSCOPY N/A 3/28/2018    COLONOSCOPY performed by Maureen Wright MD at [address]  3/28/2018         HX HYSTEROSCOPY  03/1996    w/ D&C    HX OTHER SURGICAL  02/19/10    tumor in bladder removed x2 in 2010    HX OTHER SURGICAL  03/25/10    Liver Biopsy--Cirrhosis    HX TUBAL LIGATION      HX UROLOGICAL      tumor removed from bladder    UPPER GI ENDOSCOPY,DIAGNOSIS  3/28/2018           Social History   Substance Use Topics    Smoking status: Never Smoker    Smokeless tobacco: Never Used    Alcohol use No      Family History   Problem Relation Age of Onset    Diabetes Brother      Type II    Hypertension Brother     Heart Disease Father     Heart Attack Father 64    Hypertension Father     Hypertension Mother     Osteoporosis Mother      +Hip fracture    Hypertension Sister     Stroke Sister     Anesth Problems Neg Hx      Current Outpatient Prescriptions   Medication Sig    cpap machine kit by Does Not Apply route nightly.  oxyCODONE IR (ROXICODONE) 5 mg immediate release tablet Take 1 Tab by mouth every four (4) hours as needed for Pain. Max Daily Amount: 30 mg.    furosemide (LASIX) 80 mg tablet Take 80 mg by mouth daily.  lactulose (CHRONULAC) 10 gram/15 mL solution Take 45 mL by mouth three (3) times daily. Indications: Hepatic Encephalopathy    spironolactone (ALDACTONE) 100 mg tablet Take 1 Tab by mouth daily. Indications: Edema (Patient taking differently: Take 200 mg by mouth daily. Indications: Edema)    ondansetron (ZOFRAN ODT) 4 mg disintegrating tablet Take 4 mg by mouth every eight (8) hours as needed for Nausea.  albuterol (PROVENTIL VENTOLIN) 2.5 mg /3 mL (0.083 %) nebulizer solution 2.5 mg by Nebulization route every four (4) hours as needed for Wheezing.  omeprazole (PRILOSEC) 20 mg capsule Take 20 mg by mouth daily.      No current facility-administered medications for this visit. Allergies   Allergen Reactions    Adhesive Tape-Silicones Rash        Review of Systems: A complete review of systems was obtained, negative except as described above. Physical Exam:     Visit Vitals    /61 (BP 1 Location: Left arm, BP Patient Position: Sitting)    Pulse 90    Temp 96.7 °F (35.9 °C) (Oral)    Ht 5' 3\" (1.6 m)    Wt 233 lb 12.8 oz (106.1 kg)    SpO2 99%    BMI 41.42 kg/m2     ECOG PS: 1  General: No distress, obese  Eyes: PERRLA, anicteric sclerae  HENT: Atraumatic with normal appearance of ears and nose; OP clear  Neck: Supple; no thyromegaly   Lymphatic: No cervical, supraclavicular, or inguinal adenopathy  Respiratory: CTAB, normal respiratory effort  CV: Normal rate, regular rhythm, no murmurs. 3-4+ pitting edema in BLE. Non-pitting edema to RUE  GI: Soft, nontender, nondistended, no masses, no hepatomegaly, no splenomegaly  MS: Normal gait and station. Digits without clubbing or cyanosis. Right arm wrapped with staples intact. Skin:  Ecchymoses noted to RUE from her surgery. Normal temperature, turgor, and texture. Neuro/Psych: Alert, oriented, appropriate affect, normal judgment/insight       Results:     Lab Results   Component Value Date/Time    WBC 10.8 06/06/2018 03:48 AM    HGB 10.1 (L) 06/06/2018 03:48 AM    HCT 31.2 (L) 06/06/2018 03:48 AM    PLATELET 68 (L) 53/20/7168 03:48 AM    MCV 96.3 06/06/2018 03:48 AM    ABS.  NEUTROPHILS 9.3 (H) 06/06/2018 03:48 AM     Lab Results   Component Value Date/Time    Sodium 134 (L) 06/08/2018 04:15 AM    Potassium 4.8 06/08/2018 04:15 AM    Chloride 103 06/08/2018 04:15 AM    CO2 24 06/08/2018 04:15 AM    Glucose 143 (H) 06/08/2018 04:15 AM    BUN 31 (H) 06/08/2018 04:15 AM    Creatinine 1.33 (H) 06/08/2018 04:15 AM    GFR est AA 48 (L) 06/08/2018 04:15 AM    GFR est non-AA 39 (L) 06/08/2018 04:15 AM    Calcium 8.9 06/08/2018 04:15 AM     Lab Results   Component Value Date/Time    Bilirubin, total 3.4 (H) 06/06/2018 03:48 AM    ALT (SGPT) 32 06/06/2018 03:48 AM    AST (SGOT) 47 (H) 06/06/2018 03:48 AM    Alk. phosphatase 149 (H) 06/06/2018 03:48 AM    Protein, total 6.3 (L) 06/06/2018 03:48 AM    Albumin 2.5 (L) 06/06/2018 03:48 AM    Globulin 3.8 06/06/2018 03:48 AM         Imaging:     LLE doppler 12/20/17:  CONCLUSION: Left lower extremity venous duplex negative for deep  venous thrombosis or thrombophlebitis in the veins visualized. Prominent lymph node noted in the left groin measuring 2.97 x 1.44 cm. Right common femoral vein is thrombus free. Procedures:  EGD 3/27/18:   Impression:    Grade I Esophageal Varix     Recommendations: -Continue acid suppression. ,  -GERD diet: avoid fried and fatty foods. peppermint, chocolate, alcohol, coffee, citrus fruits and juices, tomoato products; avoid lying down for 2 to 3 hours after eating. -Colonoscopy today    Colonoscopy 3/27/18: Findings:   Rectum: Grade 1 internal and external hemorrhoid(s); Sigmoid:     - Diverticulosis, Food residue and stool noted  Descending Colon:     - Diverticulosis  Transverse Colon: normal- stool present  Ascending Colon: normal, Food residue and stool noted  Cecum: normal  Terminal Ileum: normal  Interventions:  1 complete polypectomy were performed using cold biopsy forceps and the polyps was retrieved (tubular adenoma on path)    Assessment & Plan:   Lalo Schmidt is a 69 y.o. female with ROBBINS cirrhosis, esophageal varices now with worsening anemia, thrombocytopenia.      1. Normocytic anemia: Multifactorial and current anemia is likely due to recent shoulder surgery. Anemia in the past has been due to splenic sequestration and some component of iron deficiency. Iron profile in March 2018 was c/w iron deficiency and patient received 1 dose of iron sucrose, transfusion of 1 Unit in 3/2018 and Injectafer x 1 in April 2018. Bone marrow biopsy normal and shows adequate trilineage hematopoiesis.  GI evaluation with EGD identified Grade I esophageal varix. Colonoscopy showed a tubular adenoma. Hgb 13.5 on 5/23/18 prior to surgery  and 9.2 on 6/14/18 s/p 1 week after ORIF of right shoulder. -- No iron supplementation needed at this time. -- Will see her back in 6 months with CBC.     2. Thrombocytopenia: 2/2 cirrhosis and splenic sequestration. -- Continue to monitor      3. ROBBINS cirrhosis, esophageal varices: Patient requires regular monitoring and care from a Hepatologist such as surveillance EGDs with banding, use of Propranolol and more aggressive management of fluid overload. Pt used to be a part of the ROBBINS program at [facility], but she does not wish to go back to [facility]. She is scheduled to see Dr. Ann Marie Pelayo at [facility] in 4/20/2018. Her weight and overall fluid status had worsened between Jan and March 2018, but starting to improve with Spironolactone. -- On spironolactone, lasix and tid lactulose for hyperammonemia  -- Follow up with hepatologist at [facility] on 7/18/18     4. H/o LLE DVT: Resolved spontaneously without any anticoagulation. Patients with cirrhosis have concomitant risks of bleeding and clotting. I do not feel hypercoag workup is warranted in this patient who has her first lifetime DVT at age 69 since testing will be low yield and results will likely not . Given patient's PLT count less than 50 and presence of esophageal varices, I feel that the risks of anticoagulation outweigh the benefits. I do recommend supportive care with elevation and gentle mobilization. Regarding IVC filter, I do not recommend placement in this patient since she has DVT limited to peroneal vein rather than extensive DVT. IVC filters are recommended in patients who require but have contraindications to anticoagulation, with evidence of extensive clot burden which are at risk for fatal PE.   -- No anticoagulation recommended. -- Follow up as needed.     5. Right humerus fracture: ORIF of right shoulder on 6/5/18.   -- Follow up today to remove staples     I appreciate the opportunity to participate in Ms. Lilibeth Mckeon's care.     Signed By: Kalli Urrutia MD     June 18, 2018

## 2018-07-18 NOTE — PROGRESS NOTES
[address] Ch[address] Patterson MD, FACP, [facility], [place]       EDUARD Mcgee, GIOVANNA Ashraf, ACNP-BC   EDUARD Lambert NP [facility]    at [address], [facility], [facility] 22.    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 ICD-10-CM: S42.301K  ICD-9-CM: 733.82  6/5/2018        ROBBINS (nonalcoholic steatohepatitis) ICD-10-CM: K75.81  ICD-9-CM: 571.8  5/13/2018        Anemia ICD-10-CM: D64.9  ICD-9-CM: 285.9  3/26/2018        Hypertension ICD-10-CM: I10  ICD-9-CM: 401.9  Unknown        Leukopenia ICD-10-CM: D72.819  ICD-9-CM: 288.50  3/26/2018        GERD (gastroesophageal reflux disease) ICD-10-CM: K21.9  ICD-9-CM: 530.81  Unknown        Thrombocytopenia ([facility] ICD-10-CM: D69.6  ICD-9-CM: 287.5  12/16/2017        DVT (deep venous thrombosis) ([facility] ICD-10-CM: I82.409  ICD-9-CM: 453.40  12/16/2017        Cirrhosis ([facility] ICD-10-CM: K74.60  ICD-9-CM: 571.5  12/16/2017        Esophageal varices ([facility] ICD-10-CM: I85.00  ICD-9-CM: 456.1  12/16/2017        Obesity, morbid ([facility] ICD-10-CM: E66.01  ICD-9-CM: 278.01  12/8/2017        Osteopenia ICD-10-CM: M85.80  ICD-9-CM: 733.90  1/1/2016        Menopausal syndrome ICD-10-CM: N95.1  ICD-9-CM: 627.2  Unknown            Darek Lamonte returns to the [address] for management of cirrhosis secondary to non-alcoholic steatohepatitis (ROBBINS). The active problem list, all pertinent past medical history, medications, liver histology, and laboratory findings related to the liver disorder were reviewed with the patient. The patient is a 77 y.o.  female who was found to have cirrhosis and ROBBINS on liver biopsy in 2010. Serologic evaluation for causes of chronic liver disease were negative. The most recent imaging of the liver was MRI performed in 2012. Results suggest cirrhosis. No liver mass lesions noted. An ECHO from 11/2017 shows mild pulmonary HTN, RV dilation and TR. The patient's edema is well controlled per patient, although she still has significant LE swelling. Hepatic encephalopathy is controlled with lactulose. She has not developed ascites. The most recent laboratory studies indicate the AST is normal, ALT is elevated, ALP is elevated, tests of hepatic synthetic and metabolic function are abnormal, total bilirubin is elevated, INR is elevated, albumin is depressed   and the platelet count is depressed. The patient notes fatigue and swelling of the lower extremities. The patient has not experienced pain in the right side over the liver, problems concentrating, swelling of the abdomen, hematemesis, hematochezia. The patient has mild limitations in functional activities which can be attributed to the liver disease and to other medical problems that are not related to the liver disease. The patient has had the arm surgery to repair her humerus fracture. Pt was on step 2 diuretics but JIM in hospital after arm surgery, so they dropped her to 100/80. Asked her to go back up to 200 and have [employer] check them in 2 weeks. ALLERGIES  Allergies   Allergen Reactions    Adhesive Tape-Silicones Rash     MEDICATIONS  Current Outpatient Prescriptions   Medication Sig    cpap machine kit by Does Not Apply route nightly.     furosemide (LASIX) 80 mg tablet Take 40 mg by mouth daily.  lactulose (CHRONULAC) 10 gram/15 mL solution Take 45 mL by mouth three (3) times daily. Indications: Hepatic Encephalopathy    spironolactone (ALDACTONE) 100 mg tablet Take 1 Tab by mouth daily. Indications: Edema    ondansetron (ZOFRAN ODT) 4 mg disintegrating tablet Take 4 mg by mouth every eight (8) hours as needed for Nausea.  albuterol (PROVENTIL VENTOLIN) 2.5 mg /3 mL (0.083 %) nebulizer solution 2.5 mg by Nebulization route every four (4) hours as needed for Wheezing.  omeprazole (PRILOSEC) 20 mg capsule Take 20 mg by mouth daily.  oxyCODONE IR (ROXICODONE) 5 mg immediate release tablet Take 1 Tab by mouth every four (4) hours as needed for Pain. Max Daily Amount: 30 mg. No current facility-administered medications for this visit. SYSTEM REVIEW NOT RELATED TO LIVER DISEASE OR REVIEWED ABOVE:  Constitution systems: + weight loss (fluid), Negative for fever, chills, weight gain. Eyes: Negative for visual changes. ENT: Negative for sore throat, painful swallowing. Respiratory: Negative for cough, hemoptysis, SOB. Cardiology: Negative for chest pain, palpitations. GI:  Negative for constipation or diarrhea. : Negative for urinary frequency, dysuria, hematuria, nocturia. Skin: Negative for rash. Hematology: Negative for easy bruising, blood clots. Musculo-skeletal:  Negative for back pain, muscle pain, weakness. Neurologic: Negative for headaches, dizziness, vertigo, memory problems not related to HE. Psychology: Negative for anxiety, depression. FAMILY HISTORY:  There is no family history of liver disease. SOCIAL HISTORY:  The patient is . The patient has 4 children and 10 grandchildren. The patient has never used tobacco products. The patient has never consumed significant amounts of alcohol. 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      PHYSICAL EXAMINATION:  Visit Vitals    Ht 5' 3\" (1.6 m)    Wt 232 lb 3.2 oz (105.3 kg)    BMI 41.13 kg/m2     General: No acute distress. Obese. Eyes: Sclera anicteric. ENT: No oral lesions. Nodes: No adenopathy. Skin: No spider angiomata. No jaundice. No palmar erythema. Respiratory: Lungs clear to auscultation. Cardiovascular: Regular heart rate. No murmurs. No JVD. Abdomen: Soft non-tender. Liver size normal to percussion/palpation. Spleen not palpable. No obvious ascites. Extremities: +2 bilateral pitting edema up to mid tibia. Feet are 2+. No muscle wasting. No gross arthritic changes. Sling on RUE. Neurologic: Alert and oriented. Cranial nerves grossly intact. No asterixis. LABORATORY STUDIES:  [facility] [address] & Units 4/20/2018   WBC 3.4 - 10.8 x10E3/uL 3.9   ANC 1.8 - 8.0 K/UL    HGB 11.1 - 15.9 g/dL 10.9 (L)    - 379 x10E3/uL 85 (LL)   INR 0.8 - 1.2 1.5 (H)   AST 0 - 40 IU/L 54 (H)   ALT 0 - 32 IU/L 20   Alk Phos 39 - 117 IU/L 177 (H)   Bili, Total 0.0 - 1.2 mg/dL 2.3 (H)   Bili, Direct 0.00 - 0.40 mg/dL 0.85 (H)   Albumin 3.5 - 4.8 g/dL 2.7 (L)   BUN 8 - 27 mg/dL 11   Creat 0.57 - 1.00 mg/dL 0.78   Na 134 - 144 mmol/L 140   K 3.5 - 5.2 mmol/L 3.8   Cl 96 - 106 mmol/L 102   CO2 18 - 29 mmol/L 27   Glucose 65 - 99 mg/dL 93   Magnesium 1.6 - 2.4 mg/dL    Ammonia <32 UMOL/L      SEROLOGIES:  Serologies Latest Ref Rng & Units 4/20/2018 3/26/2018   Ferritin 8 - 252 NG/ML  20   Iron % Saturation 20 - 50 %  12 (L)   GREG Ab, Direct Negative Negative    C-ANCA Neg:<1:20 titer <1:20    P-ANCA Neg:<1:20 titer <1:20    ANCA Neg:<1:20 titer <1:20    ASMCA 0 - 19 Units 17    M2 Ab 0.0 - 20.0 Units 6.1    Alpha-1 antitrypsin level 90 - 200 mg/dL 128      Serologies Latest Ref Rng & Units 12/18/2017   Hep B Surface Ag Index <0.10   Hep B Surface Ag Interp NEG   NEGATIVE   Hep B Surface Ab mIU/mL <3.10   Hep B Surface Ab Interp  NONREACTIVE   Hep C Ab NR   NONREACTIVE     LIVER HISTOLOGY:  7/2010. Reviewed at [facility].   ROBBINS with cirrhosis. ENDOSCOPIC PROCEDURES:    3/28/2018. EGD by Dr. Maribeth Steward. One grade 1 esophageal varix. Sliding hiatal hernia. No recommendation in note for repeat. 3/28/2018. CY by Dr. Maribeth Steward. Sub optimal prep. 1 small rectal polyp. Repeat in 1 year. 2012. EGD performed by Dr Malina Cornell. Small esophageal varices. No banding performed. Mild portal gastropathy. RADIOLOGY:  2012. MRI scan abdomen. Changes consistent with cirrhosis. No liver mass lesions. No dilated bile ducts. No bile duct strictures. No ascites. OTHER TESTIN2017. ECHO heart. Normal LVEF 55-65%, LA dilated, RV dilated, TR. PAP 44 mmHg. ASSESSMENT AND PLAN:  Cirrhosis secondary to ROBBINS. Have ordered hepatic panel, BMP, INR, CBC with platelet count to assess liver function and recalculate MELD score. The most recent laboratory studies indicate the AST is elevated, ALT is normal, alkaline phosphatase is elevated, total bilirubin is elevated, albumin is depressed, and the platelet count is depressed. The patient is not a candidate for liver transplantation because of pulmonary hypertension and TR. The CTP is 9. Child class B. The MELD score is 14. Lower extremity edema is responding to current doses of diuretics. Continue current step 2 dosing. Esophageal varices were evaluated via EGD on 3/28/2018. She had 1 grade one esophageal varix. Repeat per GI. Hepatic encephalopathy is controlled with lactulose. She is on BID and we discussed going down to daily. That is fine, but go back up if she gets confused. They are in agreement. Obesity: She needs to make this a priority now. We discussed this and patient acknowledges she needs to lose weight and will work on it. The patient was directed to continue all current medications at the current dosages. There are no contraindications for the patient to take any medications that are necessary for treatment of other medical issues. The patient was counseled regarding alcohol consumption. Thrombocytopenia secondary to cirrhosis. There is no evidence of overt bleeding. Neutropenia secondary to cirrhosis. There is no evidence of infection. There is no indication for GCSF at this time. Anemia from chronic GI blood loss from portal hypertension and iron deficiency. The risk of osteoporosis is increased in patients with cirrhosis. DEXA bone density to assess for osteoporosis demonstrated osteopenia. The need for vaccination against viral hepatitis A will be assessed with serologic and instituted as appropriate. [facility] screening has been performed recently. AFP was normal in 5/2018. Ultrasound will be ordered at next visit. She still does not have enough mobility in that arm, but it is improving. All of the above issues were discussed with the patient. All questions were answered. The patient expressed a clear understanding of the above. [facility] 87 in 2 months.      Yola Blanc, ACNP-BC  Liver [place] [facility] [address] [facility], [address] [facility], [facility] 22.  [phone]

## 2018-07-18 NOTE — MR AVS SNAPSHOT
2700 [facility] [phone] 1400 [address] [place] [address] 
[phone] Patient: Denilson Melchor MRN: [id number] [facility]:3/73/[id number] Visit Information Date & Time Provider Department Dept. Phone Encounter #  
 7/18/2018 10:30 AM Shalonda Montague, 3687 [facility] 219 [id number] Your Appointments 9/20/2018  1:30 PM  
Follow Up with Shalonda Montague, EDUARD Harris 75 ([facility] Appt Note: Follow up [address] [facility] [phone] [facility] [address]  
[phone]  
  
   
 [address] [facility] 505 [facility] [address]  
  
    
 12/18/2018 10:00 AM  
ESTABLISHED PATIENT with MD Carol [facility] at [facility]) Appt Note: Anemia, Thorn FU.  
 [address] [facility], [address] [facility] 99 [address]  
[phone]  
  
   
 [address] [place] [address], [address] Upcoming Health Maintenance Date Due DTaP/Tdap/Td series (1 - Tdap) 1/23/1969 ZOSTER VACCINE AGE 60> 11/23/2007 GLAUCOMA SCREENING Q2Y 1/23/2013 Pneumococcal 65+ High/Highest Risk (1 of 2 - PCV13) 1/23/2013 Influenza Age 5 to Adult 8/1/2018 FOBT Q 1 YEAR AGE 50-75 3/27/2019 BREAST CANCER SCRN MAMMOGRAM 11/17/2019 Allergies as of 7/18/2018  Review Complete On: 7/18/2018 By: Rachel Kidd LPN Severity Noted Reaction Type Reactions Adhesive Tape-silicones  29/03/7507    Rash Current Immunizations  Reviewed on 4/20/2018 Name Date Influenza High Dose Vaccine PF 1/26/2018 11:28 AM  
  
 Not reviewed this visit You Were Diagnosed With   
  
 Codes Comments ROBBINS (nonalcoholic steatohepatitis)    -  Primary ICD-10-CM: V93.08 ICD-9-CM: 571.8 Vitals BP Pulse Temp Resp Height(growth percentile) Weight(growth percentile)  120/56 (BP 1 Location: Left arm, BP Patient Position: Sitting) 91 98 °F (36.7 °C) (Tympanic) 18 5' 3\" (1.6 m) 232 lb 3.2 oz (105.3 kg) SpO2 BMI OB Status Smoking Status 99% 41.13 kg/m2 Postmenopausal Never Smoker Vitals History BMI and BSA Data Body Mass Index Body Surface Area  
 41.13 kg/m 2 2.16 m 2 Preferred Pharmacy Pharmacy Name Phone [facility], [address] [facility]. [phone] Your Updated Medication List  
  
   
This list is accurate as of 7/18/18 10:32 AM.  Always use your most recent med list.  
  
  
  
  
 albuterol 2.5 mg /3 mL (0.083 %) nebulizer solution Commonly known as:  PROVENTIL VENTOLIN  
2.5 mg by Nebulization route every four (4) hours as needed for Wheezing. cpap machine kit  
by Does Not Apply route nightly. furosemide 80 mg tablet Commonly known as:  LASIX Take 40 mg by mouth daily. lactulose 10 gram/15 mL solution Commonly known as:  Tonna Nicki Take 45 mL by mouth three (3) times daily. Indications: Hepatic Encephalopathy  
  
 omeprazole 20 mg capsule Commonly known as:  PRILOSEC Take 20 mg by mouth daily. oxyCODONE IR 5 mg immediate release tablet Commonly known as:  Genie [place] Take 1 Tab by mouth every four (4) hours as needed for Pain. Max Daily Amount: 30 mg.  
  
 spironolactone 100 mg tablet Commonly known as:  ALDACTONE Take 1 Tab by mouth daily. Indications: Edema ZOFRAN ODT 4 mg disintegrating tablet Generic drug:  ondansetron Take 4 mg by mouth every eight (8) hours as needed for Nausea. We Performed the Following CBC W/O DIFF [25824 CPT(R)] HEPATIC FUNCTION PANEL [79781 CPT(R)] METABOLIC PANEL, BASIC [33412 CPT(R)] PROTHROMBIN TIME + INR [02471 CPT(R)] Introducing \[facility] [place] [facility]\"" & HEALTH SERVICES! Dear Alec Massey: Thank you for requesting a Adyoulike account. Our records indicate that you already have an active Adyoulike account. You can access your account anytime at [email or web address] Did you know that you can access your hospital and ER discharge instructions at any time in [email or web address]? You can also review all of your test results from your hospital stay or ER visit. Additional Information If you have questions, please visit the Frequently Asked Questions section of the [email or web address] website at [email or web address] Remember, [email or web address] is NOT to be used for urgent needs. For medical emergencies, dial 911. Now available from your iPhone and Android! Please provide this summary of care documentation to your next provider. Your primary care clinician is listed as Marilee Cartwright. If you have any questions after today's visit, please call [phone].

## 2018-07-18 NOTE — PROGRESS NOTES
Chief Complaint   Patient presents with    Cirrhosis Of Liver     follow up        Room 1    1. Have you been to the ER, urgent care clinic since your last visit? Hospitalized since your last visit? [facility] in June for right arm surgery    2. Have you seen or consulted any other health care providers outside of the [address] [place] [address] since your last visit? Include any pap smears or colon screening. See's pcp.  Dr Dona Quintanilla    Visit Vitals    /56 (BP 1 Location: Left arm, BP Patient Position: Sitting)    Pulse 91    Temp 98 °F (36.7 °C) (Tympanic)    Resp 18    Ht 5' 3\" (1.6 m)    Wt 232 lb 3.2 oz (105.3 kg)    SpO2 99%    BMI 41.13 kg/m2

## 2018-08-24 NOTE — PROGRESS NOTES
Spoke with BRISEYDA Boudreaux the other day and she had an increase in abdominal distention. She was still soft at that time. He has been titrating her diuretics often due to increasing fluid and kidney issues. He ordered a CT scan and faxed the results to me yesterday. It indicated \"prominent\" ascites. I have ordered a para but called pt to assess her symptoms. She states she feels better and has been diuresing a bunch today. She does not feel like it needs to be today. She would prefer to wait until Monday since she is improved today. We will attempt to schedule her for Monday. Also, she will already be in [place] for an ortho appointment. Called Shane Stephenson to update him and he is out of the office today and Monday. I gave her my cell phone number for him to call me so I can update him. Tammi Dee NP  10:38 AM

## 2018-08-27 NOTE — ROUTINE PROCESS
TRANSFER - OUT REPORT:    Verbal report given to ED RN (name) on Enid Dyer  being transferred to ER 6 (unit) for routine progression of care       Report consisted of patients Situation, Background, Assessment and   Recommendations(SBAR). Information from the following report(s) Procedure Summary was reviewed with the receiving nurse. Lines:   Peripheral IV 08/27/18 Left Antecubital (Active)   Site Assessment Clean, dry, & intact 8/27/2018 11:35 AM   Phlebitis Assessment 0 8/27/2018 11:35 AM   Infiltration Assessment 0 8/27/2018 11:35 AM   Dressing Status Clean, dry, & intact 8/27/2018 11:35 AM   Hub Color/Line Status Pink 8/27/2018 11:35 AM        Opportunity for questions and clarification was provided, post paracentesis in ERUS.     Patient transported with:   [place]

## 2018-08-27 NOTE — DISCHARGE INSTRUCTIONS
Ascites: Care Instructions  Your Care Instructions  Ascites (say \"uh-SY-teez\") is a buildup of extra fluid in the belly. It can cause your belly to swell. It can also make it hard for you to breathe. Many diseases can cause ascites. But most people who get it have a liver problem. When the liver gets damaged, it can cause fluid to back up from the liver or from blood vessels. Then this fluid builds up in the belly. Your doctor may take a sample of the fluid in your belly with a thin needle. The sample is then tested to help find out the cause of the ascites. Treatment may include medicine. It may also include changing the way you eat so you don't eat a lot of salt. If your ascites is very bad and hard to treat, your doctor may need to use a needle to take out the fluid. Follow-up care is a key part of your treatment and safety. Be sure to make and go to all appointments, and call your doctor if you are having problems. It's also a good idea to know your test results and keep a list of the medicines you take. How can you care for yourself at home? · Be safe with medicines. Take your medicines exactly as prescribed. Call your doctor if you have any problems with your medicine. You will get more details on the specific medicines your doctor prescribes. · Eat low-salt foods, and don't add salt to your food. If you eat a lot of salt, it's harder to get rid of the extra fluid. Salt is in many prepared foods. These include ta, canned foods, snack foods, sauces, and soups. Look for products that are low-sodium or have reduced salt. · Do not drink any alcohol until you are all better. Alcohol will damage the liver more. If your liver disease is caused by drinking alcohol, do not drink alcohol at all. Tell your doctor if you need help to quit. Counseling, support groups, and sometimes medicines can help you stay sober. · Talk to your doctor before you take any other medicines.  These include over-the-counter medicines, vitamins, and herbal products. · Make sure your doctor knows all the medicines you take. Some medicines, such as acetaminophen (Tylenol), can make liver problems worse. When should you call for help? Call 911 anytime you think you may need emergency care. For example, call if:    · You have trouble breathing.     · You vomit blood or what looks like coffee grounds.    Call your doctor now or seek immediate medical care if:    · You have new or worse belly pain.     · You have a fever.    Watch closely for changes in your health, and be sure to contact your doctor if:    · You have any problems.     · Your belly is getting bigger.     · You are gaining weight. Where can you learn more? Go to http://sonny-mark.info/. Enter B970 in the search box to learn more about \"Ascites: Care Instructions. \"  Current as of: May 12, 2017  Content Version: 11.7  © 3492-3116 [employer], [employer]. Care instructions adapted under license by [employer] (which disclaims liability or warranty for this information). If you have questions about a medical condition or this instruction, always ask your healthcare professional. [address] any warranty or liability for your use of this information.

## 2018-08-27 NOTE — ED NOTES
Patient ambulatory to restroom without gait disturbances. Returned to bed and on the monitor x2. Will continue to monitor.

## 2018-08-27 NOTE — ED NOTES
Bedside and Verbal shift change report given to Jemma Godoy RN (oncoming nurse) by Ubaldo Jacques RN (offgoing nurse). Report included the following information SBAR, Kardex, ED Summary, [facility] and Recent Results.

## 2018-08-27 NOTE — ED NOTES
Patient given discharge instructions. No questions or concerns at this time. Patients VSS and in no acute distress. Patient ambulatory out of unit at discharge.

## 2018-08-27 NOTE — ED NOTES
Ultrasound called and advised paracentesis is complete. 7,120 fluid drained: 25g albumin given; dressing to the right lower abdomen is dry and intact.

## 2018-08-27 NOTE — PROGRESS NOTES
Admission Medication Reconciliation:    Information obtained from:  and daughter    Significant PMH/Disease States:   Past Medical History:   Diagnosis Date    Anemia     Fall at home 2018    fracture to right shoulder    GERD (gastroesophageal reflux disease)     H/O bone density study 10/28/2016    Osteopenia    Hypermenorrhea     Hypertension     no current meds    Leiomyoma of uterus, unspecified     Liver cirrhosis secondary [facility]BINS ([facility]     Menopausal syndrome     ROBBINS (nonalcoholic steatohepatitis)     Osteopenia 2016    Sleep apnea     cpap    Thrombocytopenia (HCC)     PLT NORM 60-70 PER DAUGHTER    Transitional cell bladder cancer ([facility]     Has been cleared by urology       Chief Complaint for this Admission:  Abdominal pain    Allergies:  Adhesive tape-silicones    Prior to Admission Medications:   Prior to Admission Medications   Prescriptions Last Dose Informant Patient Reported? Taking? albuterol (PROVENTIL VENTOLIN) 2.5 mg /3 mL (0.083 %) nebulizer solution 2018 at Unknown time  Yes Yes   Si.5 mg by Nebulization route every four (4) hours as needed for Wheezing. furosemide (LASIX) 80 mg tablet 2018 at Unknown time  Yes Yes   Sig: Take 80 mg by mouth daily. lactulose (CHRONULAC) 10 gram/15 mL solution   Yes Yes   Sig: Take 20 g by mouth two (2) times a day. omeprazole (PRILOSEC) 20 mg capsule 2018 at Unknown time Family Member Yes Yes   Sig: Take 20 mg by mouth daily. spironolactone (ALDACTONE) 100 mg tablet 2018 at Unknown time  No Yes   Sig: Take 1 Tab by mouth daily. Indications: Edema      Facility-Administered Medications: None         Comments/Recommendations:  and daughter provided history. Allergies were reviewed, no changes. Revised:  1. Lactulose to 20g (30 mL) BID    Deleted:  1. CPAP machine  2. Zofran (\"never uses\")  3.  Oxycodone (therapy complete-for her arm)    Thank you for allowing me to participate in the care of your patient.     Shantel NovoaD, RN #[id number]

## 2018-08-27 NOTE — ED NOTES
Bedside and Verbal shift change report given to Elizabeth Garcia (oncoming nurse) by Tay Kapadia (offgoing nurse). Report included the following information SBAR, ED Summary, MAR and Recent Results.

## 2018-08-27 NOTE — TELEPHONE ENCOUNTER
Jarett RAIN called about mutual pt Latia Thomason. He stated that Mylene spoke with pt Friday and told her to come in Monday if her symptoms worsened. She was originally doing better with diuretics but her symptoms began worsening over the weekend. The patient reported to her PCP this morning with abdominal distention and declining labs. Ms. Oneida Luna informed her PCP that she is going to stop by this office today for labs and to try and be fit in on today's schedule with Mylene. No openings currently available.  left to offer patient 9 AM appt for tomorrow  08/28/2018.     Ricci Perry

## 2018-08-27 NOTE — ED PROVIDER NOTES
HPI Comments: 77 y.o. female with past medical history significant for ROBBINS, thrombocytopenia, sleep apnea on CPAP who presents from [facility], accompanied by family, with chief complaint of abdominal pain. Patient was diagnosed with ascites by CT 4 days ago. Patient complains of abdominal pain and distention for the past week, getting progressively worse. Patient states pain is worst in the RUQ, better with laying flat, worse when sitting up. Family reports patient is taking Lasix and Spironolactone without relief. Patient has never had a paracentesis. Patient reports bilateral ankle swelling is chronic. No fever, vomiting. There are no other acute medical concerns at this time. Social hx: Nonsmoker, no EtOH  PCP: BRISEYDA Corado  Hepatologist: Dr. Karlie Lawler, NP    Note written by Brandon Rivera, as dictated by Saad Addison MD 12:19 PM      The history is provided by the patient, a relative and the spouse.         Past Medical History:   Diagnosis Date    Anemia     Fall at home 04/29/2018    fracture to right shoulder    GERD (gastroesophageal reflux disease)     H/O bone density study 10/28/2016    Osteopenia    Hypermenorrhea     Hypertension     no current meds    Leiomyoma of uterus, unspecified     Liver cirrhosis secondary to ROBBINS ([facility]     Menopausal syndrome     ROBBINS (nonalcoholic steatohepatitis)     Osteopenia 2016    Sleep apnea     cpap    Thrombocytopenia (HCC)     PLT NORM 60-70 PER DAUGHTER    Transitional cell bladder cancer ([facility]     Has been cleared by urology       Past Surgical History:   Procedure Laterality Date    COLONOSCOPY N/A 3/28/2018    COLONOSCOPY performed by Judah Aceves MD at [address] [place] [address] Drive  3/28/2018         HX HYSTEROSCOPY  03/1996    w/ D&C    HX OTHER SURGICAL  02/19/10    tumor in bladder removed x2 in 2010    HX OTHER SURGICAL  03/25/10    Liver Biopsy--Cirrhosis    HX OTHER SURGICAL  06/2018    humerus; placed plate and screws    HX OTHER SURGICAL Right 2018    plate and screws in right arm    HX TUBAL LIGATION      HX UROLOGICAL      tumor removed from bladder    UPPER GI ENDOSCOPY,DIAGNOSIS  3/28/2018              Family History:   Problem Relation Age of Onset    Diabetes Brother      Type II    Hypertension Brother     Heart Disease Father     Heart Attack Father 64    Hypertension Father     Hypertension Mother     Osteoporosis Mother      +Hip fracture    Hypertension Sister     Stroke Sister     Anesth Problems Neg Hx        Social History     Social History    Marital status:      Spouse name: N/A    Number of children: N/A    Years of education: N/A     Occupational History    Not on file. Social History Main Topics    Smoking status: Never Smoker    Smokeless tobacco: Never Used    Alcohol use No    Drug use: No    Sexual activity: Not Currently     Partners: Male     Birth control/ protection: None     Other Topics Concern    Not on file     Social History Narrative         ALLERGIES: Adhesive tape-silicones    Review of Systems   Constitutional: Negative for activity change, diaphoresis, fatigue and fever. HENT: Negative for congestion and sore throat. Eyes: Negative for photophobia and visual disturbance. Respiratory: Negative for chest tightness and shortness of breath. Cardiovascular: Negative for chest pain, palpitations and leg swelling. Gastrointestinal: Positive for abdominal distention and abdominal pain. Negative for blood in stool, constipation and vomiting. Genitourinary: Negative for difficulty urinating, dysuria, flank pain, frequency and hematuria. Musculoskeletal: Negative for back pain. Neurological: Negative for dizziness, syncope, numbness and headaches. All other systems reviewed and are negative.       Vitals:    08/27/18 1124 08/27/18 1140 08/27/18 1142 08/27/18 1200   BP: 141/66 133/61  122/61 Pulse: (!) 104      Resp: 20      Temp: 97.4 °F (36.3 °C)      SpO2: 98%  99% 98%   Weight: 112.1 kg (247 lb 1 oz)      Height: 5' 4\" (1.626 m)               Physical Exam   Constitutional: She is oriented to person, place, and time. She appears well-developed and well-nourished. No distress. HENT:   Head: Normocephalic and atraumatic. Nose: Nose normal.   Mouth/Throat: Oropharynx is clear and moist. No oropharyngeal exudate. Eyes: Conjunctivae and EOM are normal. Right eye exhibits no discharge. Left eye exhibits no discharge. No scleral icterus. Neck: Normal range of motion. Neck supple. No JVD present. No tracheal deviation present. No thyromegaly present. Cardiovascular: Normal rate, regular rhythm, normal heart sounds and intact distal pulses. Exam reveals no gallop and no friction rub. No murmur heard. Pulmonary/Chest: Effort normal and breath sounds normal. No stridor. No respiratory distress. She has no wheezes. She has no rales. She exhibits no tenderness. Abdominal: Bowel sounds are normal. She exhibits distension and fluid wave. She exhibits no mass. There is no rebound. Tense   Musculoskeletal: Normal range of motion. She exhibits no edema or tenderness. Lymphadenopathy:     She has no cervical adenopathy. Neurological: She is alert and oriented to person, place, and time. No cranial nerve deficit. Coordination normal.   Skin: Skin is warm and dry. No rash noted. She is not diaphoretic. No erythema. No pallor. Psychiatric: She has a normal mood and affect. Her behavior is normal. Judgment and thought content normal.   Nursing note and vitals reviewed. Note written by Anastacio Sellers. Varsha Light, as dictated by Andrea Mata MD 12:23 PM       MDM  Number of Diagnoses or Management Options  Abdominal pain, generalized: Other ascites:   Diagnosis management comments: Ascites 2/2 ROBBINS. 78 y/o female with new diagnosis of ROBBINS now with worsening abd distention/ascites. Plan:  Cbc, cmp, pt/inr, pt will require paracentesis. Pt to be d/c after paracentesis.        Amount and/or Complexity of Data Reviewed  Clinical lab tests: ordered and reviewed  Tests in the radiology section of CPT®: reviewed  Review and summarize past medical records: yes  Discuss the patient with other providers: yes  Independent visualization of images, tracings, or specimens: yes    Risk of Complications, Morbidity, and/or Mortality  Presenting problems: moderate  Diagnostic procedures: moderate  Management options: moderate    Patient Progress  Patient progress: stable        ED Course       Procedures

## 2018-09-14 PROBLEM — N17.9 ACUTE RENAL FAILURE (ARF) (HCC): Status: ACTIVE | Noted: 2018-01-01

## 2018-09-14 NOTE — ROUTINE PROCESS
TRANSFER - OUT REPORT: 
 
Verbal report given to LACI Perdomo(name) on Enid Dyer  being transferred to (unit) for routine progression of care Report consisted of patients Situation, Background, Assessment and  
Recommendations(SBAR). Information from the following report(s) SBAR, ED Summary, Procedure Summary, MAR and Recent Results was reviewed with the receiving nurse. Lines:  
Peripheral IV 09/14/18 Left Antecubital (Active) Site Assessment Clean, dry, & intact 9/14/2018  4:31 PM  
Phlebitis Assessment 0 9/14/2018  4:31 PM  
Infiltration Assessment 0 9/14/2018  4:31 PM  
Dressing Status Clean, dry, & intact 9/14/2018  4:31 PM  
  
 
Opportunity for questions and clarification was provided.

## 2018-09-14 NOTE — CONSULTS
[place] [address] [facility] Brandy Shane MD, Danilo Leal, [facility] Burnadette Fila, NP Elsa Dubin, PA-C Damaris Quan, [facility]   EDUARD Cronin NP [address] [facility] [address] 
  at [place] [address] [address] [facility], [address] [facility], [place]  [address]. 
  [phone] FAX: [address] 
  [facility] 
  [address], [address] 98 [facility] Danny, [address] 
  [phone] FAX: [phone] HEPATOLOGY CONSULT NOTE The patient is well known to me and [facility] She is a 77year old  female with cirrhosis secondary to ROBBINS, CKD with baseline Screat of 2.32 mg, pulmonary HTN, RV dilation and TR. She has chronic lower edema secondary to the combination of these organ dysfunctions and has recently developed refractory ascites. She has not developed HE. She has small esophageal varices without bleeding. She was seen by her PCP today and noted to have more edema and increase in Screat to 3.2 mg. She was sent to ED. She is now hospitalized. I have reviewed the Emergency room note, Hospital admission note, Notes by all other physicians who have seen the patient during this hospitalization to date. I have reviewed the problem list and the reason for this hospitalization. I have reviewed the allergies and the medications the patient was taking at home prior to this hospitalization. In her room this evening she is alert and oriented. She has sever ascites and anasarca to the hips. Screat is 3.2 mg. 
 
I had long discussion with her and 3 family members regarding long term prognosis when 3/4 organ failure is present. I estimate life expectancy to be less than 1 year.   Suggested placement of Aspira catheter to drain ascites from home and Hospice but she might not be ready for this psychologically ready for the ladder. Discussed code status and she and family agree that she would not want CPR or to be placed on Vent. Can sign paperwork tomorrow/Saturday. For now will try to improve JIM with IV albumin. Will ask Renal to see her for assistance in management of JIM and CKD. ASSESSMENT AND PLAN: 
Cirrhosis The diagnosis of cirrhosis is based upon liver biopsy This is secondary to ROBBINS. The CTP is 11. Child class C. The MELD score is 29. Child class C cirrhosis, refractory ascites and MELD score of 29 carry a 3 month survival of 80% and 1 year survival 50%. The patient is not a candidate for liver transplantation because of age and significant cardiac and pulmonary disease. Ascites This has recently developed. She did not have any ascites at the last office visit in 7/2018. This is due to cirrhosis but has most likely occurred now because of of pulmonary HTN, worsening RV dysfunction, TR and passive hepatic congestion and exacerbated by worsening CKD and JIM. This is refractory to current doses of diuretics because of kidney disease. The patient is not a candidate for TIPS shunt because of advanced right heart heart disease and pulmonary hypertension. Will need to consider placement of Aspira catheter The patient was counseled regarding the need to maintain sodium restriction and the types of foods containing high amounts of sodium to be avoided. Hepatic encephalopathy This is controlled on current doses of lactulose. Can continue the current dose BID. No need to restrict dietary protein at this time. Anasarca This is due to a combintion of cirrhosis, CKD, JIM, pulmonary HTN, RV dysfunction, and TR. Cannut use diuretics because of CKD and JIM. Have started IV albumin to try and help JIM. CKD and JIM She has CKD which has contributed to long standing anasarca. She now has JIM superimposed upon CKD with Screat increase from baseline of 2.3 to 3.2 mg. Will ask Renal to consult and see if they have suggestions. If CKD worsens I do not think she would be a good candidate for dialysis and likely tolerate this poorly because of CM. Demetrio Lord Pulmonary hypertension Unclear of etiology, primary vs secondary vs hepato-pulmonary due to cirrhosis. She is followed by Dr Jarek Beckman. She is on no treatment for pulmonary HTN 
 
RV dilation and TR Secondary to pulmonary HTN Anemia This is of unclear etiology. Likely due to multifactorial causes including chronic renal failure with adequate iron stores. Will obtain iron panel to assess for iron stores. Thrombocytopenia This is secondary to cirrhosis. There is no evidence of overt bleeding. No treatment is required. The platelet count is adequate for the patient to undergo procedures without the need for platelet transfusion or platelet growth factors. SYSTEM REVIEW: 
Constitution systems: Negative for fever, chills, weight gain, weight loss. Eyes: Negative for visual changes. ENT: Negative for sore throat, painful swallowing. Respiratory: Negative for cough, hemoptysis, SOB. Cardiology: Negative for chest pain, palpitations. Massive lower extremity swelling. GI:  Abdominal distention and discomfort. Early satiety. : Negative for urinary frequency, dysuria, hematuria, nocturia. Skin: Negative for rash. Hematology: Negative for easy bruising, blood clots. Musculo-skelatal: Negative for back pain, muscle pain, weakness. Neurologic: Negative for headaches, dizziness, vertigo, memory problems not related to HE. Psychology: Negative for anxiety, depression. FAMILY HISTORY: 
There is no family history of liver disease.   
  
SOCIAL HISTORY: 
The patient is . The patient has 4 children and 10 grandchildren. The patient has never used tobacco products. The patient has never consumed significant amounts of alcohol. 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  
  
PHYSICAL EXAMINATION: 
VS: per nursing note General:  No acute distress. Eyes:  Sclera anicteric. ENT:  No oral lesions. Thyroid normal. 
Nodes:  No adenopathy. Skin:  No spider angiomata. No jaundice. Respiratory:  Lungs clear to auscultation. Cardiovascular:  Regular heart rate. Murmur present. Abdomen:  Distended with obvious ascites. Non-tender. No rebound. Extremities:  4+ lower extremity edema to hips. Neurologic:  Alert and oriented. Cranial nerves grossly intact. No asterixis. LABORATORY: 
Results for William Alexis (MRN [id number]) as of 9/14/2018 17:49 Ref. Range 8/27/2018 11:30 8/27/2018 14:50 9/14/2018 14:00 WBC Latest Ref Range: 3.6 - 11.0 K/uL 7.5  8.7 HGB Latest Ref Range: 11.5 - 16.0 g/dL 11.3 (L)  12.3 PLATELET Latest Ref Range: 150 - 400 K/uL 76 (L)  91 (L) INR Latest Ref Range: <1.2    1.3 (H) Sodium Latest Ref Range: 136 - 145 mmol/L 138  134 (L) Potassium Latest Ref Range: 3.5 - 5.1 mmol/L 4.3  5.2 (H) Chloride Latest Ref Range: 97 - 108 mmol/L 104  100 CO2 Latest Ref Range: 21 - 32 mmol/L 24  24 Glucose Latest Ref Range: 65 - 100 mg/dL 127 (H)  103 (H) BUN Latest Ref Range: 6 - 20 MG/DL 42 (H)  52 (H) Creatinine Latest Ref Range: 0.55 - 1.02 MG/DL 2.32 (H)  3.20 (H) Bilirubin, total Latest Ref Range: 0.2 - 1.0 MG/DL 5.4 (H)  6.6 (H) Albumin Latest Ref Range: 3.5 - 5.0 g/dL 2.8 (L)  2.8 (L) ALT (SGPT) Latest Ref Range: 12 - 78 U/L 32  33 AST Latest Ref Range: 15 - 37 U/L 51 (H)  51 (H) Alk. phosphatase Latest Ref Range: 45 - 117 U/L 169 (H)  156 (H) MD Saeed Saldaña[address] De [facility] 136 [address] [place] [address], [address] Esperanza Davidson  22. 
[phone]

## 2018-09-14 NOTE — H&P
295 [facility] HISTORY AND PHYSICAL Bernardino Tafoya 
MR#: [id number] : 1950 ACCOUNT #: [de-identified] ADMIT DATE: 2018 CHIEF COMPLAINT:  Abnormal lab results. HISTORY OF PRESENT ILLNESS:  The patient is a 68-year-old patient known for liver cirrhosis secondary to ROBBINS as well as hypertension, bladder cancer, that had a paracentesis done on , and then today on follow up with her primary care physician it was noted that her creatinine was 3.1 and she was sent to the hospital for further assessment and treatment. Prior to this event in July her creatinine was 1.1. Abdominal tap done on . The patient continued her Lasix at 80 mg as well as Aldactone and lactulose. The patient did not report any adverse events such as diarrhea. Her appetite has been very decreased due to the fact of increasing the abdominal girth at this time. ALLERGIES:  NO MEDICAL ALLERGY. PATIENT HAS AN ALLERGY TO ADHESIVE TAPE. PAST MEDICAL HISTORY:  The patient has GERD, cirrhosis secondary to ROBBINS, hypertension, bladder cancer, history of iron deficiency anemia, cellulitis and thrombocytopenia that has been attributed to her liver cirrhosis. MEDICATIONS:  Zofran 4 mg as needed, Lasix 80 mg daily, lactulose 10 mg as needed, Aldactone 100 daily,  albuterol. PAST SURGICAL HISTORY:  The patient had a right humeral repair. TOXIC HABITS:  Negative. FAMILY HISTORY:  The father  of coronary disease, was a heavy smoker and the mother has Alzheimer's. SOCIAL HISTORY:  , has 4 children. Retired now from the education system. REVIEW OF SYSTEMS:   
CONSTITUTIONAL:  No fever. No weight loss. HEENT:  No facial swelling, nosebleed or discharge problem. RESPIRATORY:  No coughing, wheezing or shortness of breath. CARDIOVASCULAR:  No chest pain. GASTROINTESTINAL:  Decrease in appetite, abdominal distention. Denies any diarrhea or blood per rectum. MUSCULOSKELETAL:  Negative for diffuse pain. PHYSICAL EXAMINATION: 
VITAL SIGNS:  The patient's blood pressure is 151/67, heart rate 112, respirations 18, O2 saturation is 97%. GENERAL APPEARANCE:  This is a 77year-old comfortable, no acute distress. Patient is seen with her daughter and  next to her. HEENT:  There is no deformity. Mild pale conjunctivae. Dry oral mucosa. NECK:  Supple, no JVD, no bruits, no masses. THORAX:  Symmetrical expansion without deformity. HEART:  Regular rhythm. S1, S2 present, tachycardic with an increased S2. 
LUNGS:  Clear to auscultation. No wheezing. ABDOMEN:  Distended, huge with some wall edema. EXTREMITIES:  There is no tenderness. Limbs are symmetric with edema, chronic in nature with changes in the skin. NEUROVASCULAR:  Patient is conscious, alert, able to follow commands. There is no focalization with the DTRs 5/5 in all extremities. LABORATORY DATA:  WBC count is 8.7, H and H is 12.3 and 37.2 with platelet count of 59,400. Sodium 134, potassium 5.2, chloride 100, CO2 is 24, BUN 52, creatinine 3.2, glucose 103. The creatinine prior to this admission in August was 2.3 and July the creatinine was 1.1. 
 
ASSESSMENT AND PLAN: 
1. Acute renal failure in this patient with liver cirrhosis, probably post large volume paracentesis and the continuation of treatment could be responsible for these events. As such, gentle hydration as well as albumin will be the baseline treatment. I have continued her Lasix at 40 mg and discontinued her Aldactone due to the high of 100 mg and also the potassium is elevated at 5.2.   
2.  Liver cirrhosis. At this point, is supportive care. Continue the current medication. Once the creatinine and/or potassium come back to normal or within normal limits, after introduction of the Lasix at full dose as well as Aldactone need to be evaluated further. 3.  Gastroesophageal reflux disease. Patient to continue to proton pump inhibitor. 4.  History of iron deficiency anemia, multifactorial in this patient. As such, we will replace iron on an as needed basis. 5.  Deep venous thrombosis has been given with subacute heparin twice a day. Toby Sandifer, MD MAE/ELENA 
D: 09/14/2018 16:17    
T: 09/14/2018 17:25 
JOB #: [id number]

## 2018-09-14 NOTE — TELEPHONE ENCOUNTER
Spoke to BRISEYDA Meier who reports patient presented for an appointment with him today. Provider reports beto has been unable to sleep, has confusion, LE edema, elevated creatinine, and recurrent ascites. Provider is sending patient to [facility] ED and wanted to notify Dr. Enoc Caballero. Notified Dr. Enoc Caballero of the above.

## 2018-09-14 NOTE — PROGRESS NOTES
Primary Nurse Tania Webber and KAYLA Monahan RN performed a dual skin assessment on this patient No impairment noted Edmond score is 20

## 2018-09-14 NOTE — TELEPHONE ENCOUNTER
BRISEYDA Bellamy called stating that Ms. Toro Llamas was in office today for an office visit and he sending patient to the ER due to current symptoms of weakness, patient is very shaky, signs of ascites, and tachycardia and has elevated labs. Notified Dr. Ольга Coleman verbally, and transferred call to [address].

## 2018-09-14 NOTE — PROGRESS NOTES
Admission Medication Reconciliation: 
 
Information obtained from:  patient/patient's family Comments/Recommendations: Updated PTA meds/reviewed patient's allergies. 1)  Spoke with the patient and her family regarding her medications. Last doses of all maintenance medications were 730am today. 2)  Medication changes (since last review): Added 
- ondansetron 4 mg PRN Adjusted 
- furosemide dose 80 mg daily --> 40 mg daily Removed 
- none 3)  Patient's pharmacy: [address] [place] [address] in [place], [place] Allergies:  Adhesive tape-silicones Significant PMH/Disease States:  
Past Medical History:  
Diagnosis Date  Anemia  Fall at home 2018  
 fracture to right shoulder  GERD (gastroesophageal reflux disease)  H/O bone density study 10/28/2016 Osteopenia  Hypermenorrhea  Hypertension   
 no current meds  Leiomyoma of uterus, unspecified  Liver cirrhosis secondary to ROBBINS ([address]  Menopausal syndrome  ROBBINS (nonalcoholic steatohepatitis)  Osteopenia 2016  Sleep apnea   
 cpap  Thrombocytopenia ([address] PLT NORM 60-70 PER DAUGHTER  
 Transitional cell bladder cancer (HCC) Has been cleared by urology Chief Complaint for this Admission: Chief Complaint Patient presents with  Abnormal Lab Results Prior to Admission Medications:  
Prior to Admission Medications Prescriptions Last Dose Informant Patient Reported? Taking? albuterol (PROVENTIL VENTOLIN) 2.5 mg /3 mL (0.083 %) nebulizer solution   Yes No  
Si.5 mg by Nebulization route every four (4) hours as needed for Wheezing. furosemide (LASIX) 80 mg tablet 2018 at Unknown time  Yes Yes Sig: Take 80 mg by mouth daily. lactulose (CHRONULAC) 10 gram/15 mL solution 2018 at Unknown time  Yes Yes Sig: Take 20 g by mouth two (2) times a day. omeprazole (PRILOSEC) 20 mg capsule 2018 at Unknown time Family Member Yes Yes Sig: Take 20 mg by mouth daily. ondansetron (ZOFRAN ODT) 4 mg disintegrating tablet   Yes Yes Sig: Take 4 mg by mouth as needed for Nausea. spironolactone (ALDACTONE) 100 mg tablet 9/14/2018 at Unknown time  No Yes Sig: Take 1 Tab by mouth daily. Indications: Edema Facility-Administered Medications: None

## 2018-09-14 NOTE — PROGRESS NOTES
TRANSFER - IN REPORT: 
 
Verbal report received from [facility]) on Madeleine Gamboa  being received from ED(unit) for routine progression of care Report consisted of patients Situation, Background, Assessment and  
Recommendations(SBAR). Information from the following report(s) SBAR, ED Summary, MAR, Recent Results and Med Rec Status was reviewed with the receiving nurse. Opportunity for questions and clarification was provided. Assessment completed upon patients arrival to unit and care assumed.

## 2018-09-14 NOTE — ED TRIAGE NOTES
Pt referred to ED by PCP, Dr. Abraham Pinedo, for elevated kidney results. BUN 44, Creatinine 3.1. Hx of ROBBINS, recent admission for ascites.

## 2018-09-14 NOTE — IP AVS SNAPSHOT
1111 [facility] [address] [place] [address] 
[phone] Patient: Eri Castillo MRN: [id number] IMD:4/96/4641 You are allergic to the following Allergen Reactions Adhesive Tape-Silicones Rash Immunizations Administered for This Admission Name Date Influenza Vaccine (Quad) PF 9/20/2018 Recent Documentation Height Weight Breastfeeding? BMI OB Status Smoking Status 1.626 m 110.9 kg No 41.95 kg/m2 Postmenopausal Never Smoker Emergency Contacts  (Rel.) Home Phone Work Phone Mobile Phone [phone] -- [phone] Shameka Boudreaux (Daughter) [phone] -- [phone] About your hospitalization You were admitted on:  September 14, 2018 You last received care in the:  [facility] You were discharged on:  September 20, 2018 Why you were hospitalized Your primary diagnosis was:  Not on File Your diagnoses also included:  Acute Renal Failure (Arf) (Hcc) Providers Seen During Your Hospitalization Provider Specialty Primary office phone Ward Sutton MD Emergency Medicine [phone] Noreen Wagner MD Internal Medicine [phone] Josh Macdonald MD Hospitalist [phone] Tristan Nunez MD Hospitalist [phone] Your Primary Care Physician (PCP) Primary Care Physician Office Phone Office Fax Tati Prather [phone] Follow-up Information Follow up With Details Comments Contact Info BRISEYDA Wren   [place] [address] 
[phone] My Medications STOP taking these medications   
 furosemide 40 mg tablet Commonly known as:  LASIX  
   
  
 furosemide 80 mg tablet Commonly known as:  LASIX  
   
  
 lactulose 10 gram/15 mL solution Commonly known as:  CHRONULAC  
   
  
 omeprazole 20 mg capsule Commonly known as:  PRILOSEC  
   
  
 spironolactone 100 mg tablet Commonly known as:  ALDACTONE  
   
  
  
TAKE these medications as instructed Instructions Each Dose to Equal  
 Morning Noon Evening Bedtime  
 albuterol 2.5 mg /3 mL (0.083 %) nebulizer solution Commonly known as:  PROVENTIL VENTOLIN Your last dose was: Your next dose is:    
   
   
 2.5 mg by Nebulization route every four (4) hours as needed for Wheezing. 2.5 mg  
    
   
   
   
  
 ondansetron 4 mg disintegrating tablet Commonly known as:  ZOFRAN ODT Your last dose was: Your next dose is: Take 4 mg by mouth as needed for Nausea. 4 mg  
    
   
   
   
  
 oxyCODONE IR 5 mg immediate release tablet Commonly known as:  Benjaman Hurl Your last dose was: Your next dose is: Take 1 Tab by mouth every four (4) hours as needed. Max Daily Amount: 30 mg.  
 5 mg Where to Get Your Medications Information on where to get these meds will be given to you by the nurse or doctor. ! Ask your nurse or doctor about these medications  
  oxyCODONE IR 5 mg immediate release tablet Discharge Instructions Discharge Instructions PATIENT ID: Abbe Moore MRN: [id number] YOB: 1950 DATE OF ADMISSION: 9/14/2018  2:09 PM   
DATE OF DISCHARGE: 9/20/2018 PRIMARY CARE PROVIDER: BRISEYDA Odonnell  
 
ATTENDING PHYSICIAN: Ortega Fitzgerald MD 
DISCHARGING PROVIDER: Ortega Fitzgerald MD   
To contact this individual call [phone] and ask the  to page. If unavailable ask to be transferred the Adult Hospitalist Department. DISCHARGE DIAGNOSES ROBBINS liver cirrhosis, renal failure CONSULTATIONS: IP CONSULT TO HEPATOLOGY 
IP CONSULT TO NEPHROLOGY 
IP CONSULT TO PALLIATIVE CARE - PROVIDER 
IP CONSULT TO HOSPITALIST 
 
PROCEDURES/SURGERIES: * No surgery found * PENDING TEST RESULTS:  
 At the time of discharge the following test results are still pending: none FOLLOW UP APPOINTMENTS:  
Follow-up Information Follow up With Details Comments Contact Info Chris Teran, PA   [address] 
[phone] ADDITIONAL CARE RECOMMENDATIONS: continue draining ascitic fluid as needed. ACTIVITY: Activity as tolerated WOUND CARE: none EQUIPMENT needed: arranged at home DISCHARGE MEDICATIONS: 
{Medication reconciliation information is now added to the patient's AVS automatically when it is printed. There is no need to use this SmartLink in discharge instructions. Highlight this text and delete it to clear this message} NOTIFY YOUR PHYSICIAN FOR ANY OF THE FOLLOWING:  
Fever over 101 degrees for 24 hours. Chest pain, shortness of breath, fever, chills, nausea, vomiting, diarrhea, change in mentation, falling, weakness, bleeding. Severe pain or pain not relieved by medications. Or, any other signs or symptoms that you may have questions about. DISCHARGE MEDICATIONS: 
 See Medication Reconciliation Form · It is important that you take the medication exactly as they are prescribed. · Keep your medication in the bottles provided by the pharmacist and keep a list of the medication names, dosages, and times to be taken in your wallet. · Do not take other medications without consulting your doctor. NOTIFY YOUR PHYSICIAN FOR ANY OF THE FOLLOWING:  
Fever over 101 degrees for 24 hours. Chest pain, shortness of breath, fever, chills, nausea, vomiting, diarrhea, change in mentation, falling, weakness, bleeding. Severe pain or pain not relieved by medications. Or, any other signs or symptoms that you may have questions about. DISPOSITION: 
 X Home With: 
 OT  PT  [facility]  RN  
  
 SNF/Inpatient Rehab/LTAC Independent/assisted living Hospice Other:  
 
 
 
 
Signed: Eulalia Olvera MD 
9/20/2018 11:29 AM 
 
 Discharge Orders None Introducing [facility] HE[facility]! Dear Alec Massey: Thank you for requesting a Mult[employer] account. Our records indicate that you already have an active Multig[employer] account. You can access your account anytime at [email or web address] Did you know that you can access your hospital and ER discharge instructions at any time in Multigig? You can also review all of your test results from your hospital stay or ER visit. Additional Information If you have questions, please visit the Frequently Asked Questions section of the Multig[employer] website at [email or web address] Remember, Mult[employer]ig is NOT to be used for urgent needs. For medical emergencies, dial 911. Now available from your iPhone and Android! General Information Please provide this summary of care documentation to your next provider. Patient Signature:  ____________________________________________________________ Date:  ____________________________________________________________  
  
Arnav Khan Provider Signature:  ____________________________________________________________ Date:  ____________________________________________________________

## 2018-09-14 NOTE — ED NOTES
 
1:40 PM 
Sent by PCP for abnormal labs. Creatinine 3.1 Dehydration, ARF. Has history of ROBBINS, seen Dr. Lacy Tripp and Elissa Deshpande, EDUARD on August 27 and sent to ED for paracentesis. 7 liters off. Patient declining since and sent by [facility] for further evaluation secondary to abnormal labs.   
Nishi Rodriguez, DNP

## 2018-09-15 NOTE — CONSULTS
[id number] consult dictated A: 
JIM progressive over time. Cr 1.1 in July; 2.3 on 8-27 (when she had LV paracentesis; 7 Liters) and now 3.2 on admit Suspect pre-renal from cirrhosis/LV paracentesis/diuretics with RV dysfunction/TR. May also have HRS -which would be diagnosis of exclusion. Cirrhosis- due to ROBBINS Edema Ascites Mild Hyperkalemia- improved today P: 
supp care Hold diuretics IV Albumin Check Urine Na tomm am 
If renal fxn continues to get worse, then she would ideally need dialysis. Not a great candidate for dialysis due to Cirrhosis. Not a liver Tx candidate either. But she does have decent BP and as such would be able to tolerate HD if needed. Will let pt/family decide if and when we reach that point Caron Dave MD 
[address] [employer]

## 2018-09-15 NOTE — CONSULTS
[facility] Tyree [facility] 
MR#: [id number] : 1950 ACCOUNT #: [de-identified] DATE OF SERVICE: 09/15/2018 REQUESTING PHYSICIAN:  Dr. Wilmer Hannah. REASON FOR CONSULTATION:  Evaluation and management of renal failure. Patient was seen and examined. History obtained from the patient and chart review. 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 She underwent a large volume paracentesis with drainage of 7 liters of ascitic fluid on 2018 as outpatient. She recently followed up with her PCP when her creatinine was up to 3.1. She was also started on diuretics in the recent past.  Her creatinine was 1.11 back in July. It was 2.32 at the time when she had paracentesis done on 2018. It was up to 3.2 on admit. It has improved overnight to 3.08 after IV albumin. Her diuretics including Lasix and Aldactone have been held. She has been having intentional mild diarrhea from lactulose. No vomiting. Had occasional blood tinged food material coming out prior to admit, as per family, but no ongoing hematemesis. There are no fevers or chills. She has decided to get Aspira catheter for drainage. PAST SURGICAL HISTORY:  Status post right humeral repair. SOCIAL HISTORY:  No tobacco or alcohol. Very supportive family. Multiple members at the bedside. FAMILY HISTORY:  Negative for any CKD or ESRD. REVIEW OF SYSTEMS:  Pertinent positives were as noted in HPI. Remainder is negative. ALLERGIES:  SHE IS ALLERGIC TO ADHESIVE TAPE. HOME MEDICATIONS:  Include Zofran p.r.n., Lasix 40 mg daily, Aldactone 100 mg daily, Prilosec 20 mg daily, albuterol p.r.n. and Prilosec 20 mg daily. The home diuretics have been on hold. Lactulose 30 mL b.i.d. PHYSICAL EXAMINATION: 
GENERAL:  Elderly female, pleasant, well built, well nourished, in no acute distress. RECENT VITAL SIGNS:  She is afebrile, pulse 91, /79, respiration of 18, saturating 97% on room air, weight is 110.9 kilo. HEENT:  Head is atraumatic, normocephalic. Conjunctivae are pink. Positive scleral icterus. No oral exudate. NECK:  No JVD. LUNGS:  Clear to auscultation. HEART:  Regular rate and rhythm. Normal S1, S2. 
ABDOMEN:  Soft, distended, ascites noted, nontender, active bowel sounds. EXTREMITIES:  No clubbing or cyanosis. She has 2-3+ edema. SKIN:  Notable for jaundice. No spider angiomas. CENTRAL NERVOUS SYSTEM:  Alert, oriented x3. No asterixis or myoclonus. LABORATORY DATA:  BUN 55, creatinine 3.08 which is slightly better compared to 3.2, potassium 5.1 compared to a slight high of 5.2 yesterday. Sodium 134. She is not acidotic. Urinalysis is negative for any protein, had large blood on the dipstick and 5-10 RBCs, but she also had positive urobilinogen and urine WBCs were 5-10. CBC is essentially normal except thrombocytopenia, which is chronic. Platelets of 91. Previous hepatitis B and C studies were negative in 12/2017. Previous CT of abdomen and pelvis back in 12/2017 showed no hydronephrosis. ASSESSMENT: 
1. Acute kidney injury. This has been slowly progressive over time. She may have underlying chronic kidney disease which needs to be determined. Typically we need abnormal creatinine for 3 months to define as having chronic kidney disease. Her etiology for acute kidney injury is more likely related to prerenal azotemia from a cirrhotic physiology, diuretics, which are now on hold and recent large volume paracentesis. She may have underlying hepatorenal syndrome, which would be a diagnosis of exclusion. Obstruction is unlikely.   There are no overt fluid losses or nephrotoxins other than recent paracentesis. She is also hemodynamically relatively stable. 2.  Cirrhosis due to ROBBINS. 3.  Ascites. 4.  Edema. 5.  Mild hyperkalemia, which is better. 6.  Thrombocytopenia. 7.  Obesity. Patient has difficult clinical scenario. Renal function is slightly better with IV albumin. She is still quite total body volume overloaded with likely intravascular volume depletion. Treatment is going to be supportive. RECOMMENDATIONS: 
1. Hold diuretics as you are. 
2.  Continue IV albumin. 3.  Avoid large volume paracentesis as possible. Use p.r.n. IV albumin along with paracentesis. 4.  Check urine sodium in the morning. 5.  Avoid nephrotoxins. No ACE inhibitor or ARB. 6.  If her renal function were to get worse, she would ideally need dialysis. She would be overall not a great candidate for dialysis because of her cirrhosis and poor prognosis. Discussed with the patient and multiple family members at bedside. Patient's son-in-law is a PA, who eluded that patient may not want dialysis if she reaches that point. Thanks for renal consultation. We will follow the patient with you. MD JANIYA Gallego / Barbara Maillard 
D: 09/15/2018 14:46 T: 09/15/2018 17:31 
JOB #: [id number]

## 2018-09-15 NOTE — PROGRESS NOTES
[facility] 
  
  Karine Boudreaux MD, Telly Ellison [place], [place]  
  
  April Jacqueline Hoang, GIOVANNA Ricci, ACNP-BC   Ac Cyr, EDUARD Preston, EDUARD [facility] [address] 
  at [address], [address] [address]  22. 
  [phone] FAX: [address] [place] [address] 
  at [facility], [address] [place] [address] Drive [place], [address] [place] [address] Street - [address] 
  [phone] FAX: [phone]  
  
  
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 She has chronic lower edema secondary to the combination of these organ dysfunctions and has recently developed refractory ascites. She has not developed HE. She has small esophageal varices without bleeding. She was hospitalized for worsening anasarca, refractory ascites and JIM.   
  
In her room this evening she is alert and oriented. She has sever ascites and anasarca to the hips. Screat is 3.2 mg. 
  
In good spirits today. A lot of family visiting. She has decided to be DNR, and to have Aspira catheter placed. She does not want to do Hospice. I have told her I will have Palliative Care come by and see how they can help in her out-patient managemnt. She feels that ascites is a bit less tense today and her appetite is better. Will hold off on paracentesis today and do LVP with placement of Aspira catheter on Monday. Screat is down. Will continue IV albumin.   
  
ASSESSMENT AND PLAN: 
Cirrhosis The diagnosis of cirrhosis is based upon liver biopsy This is secondary to ROBBINS. The CTP is 11. Child class C. The MELD score is 29. Child class C cirrhosis, refractory ascites and MELD score of 29 carry a 3 month survival of 80% and 1 year survival 50%. The patient is not a candidate for liver transplantation because of age and significant cardiac and pulmonary disease.   
  
Ascites This has recently developed. She did not have any ascites at the last office visit in 7/2018. This is due to cirrhosis but has most likely occurred now because of of pulmonary HTN, worsening RV dysfunction, TR and passive hepatic congestion and exacerbated by worsening CKD and JIM. This is refractory to current doses of diuretics because of kidney disease. The patient is not a candidate for TIPS shunt because of advanced right heart heart disease and pulmonary hypertension. Will place Aspira catheter Monday. The patient was counseled regarding the need to maintain sodium restriction and the types of foods containing high amounts of sodium to be avoided. 
  
Hepatic encephalopathy This is controlled on current doses of lactulose. Can continue the current dose BID. No need to restrict dietary protein at this time.   
  
Anasarca This is due to a combintion of cirrhosis, CKD, JIM, pulmonary HTN, RV dysfunction, and TR. Cannut use diuretics because of CKD and JIM. Have started IV albumin to try and help JIM. 
  
CKD and JIM She has CKD which has contributed to long standing anasarca. She now has JIM superimposed upon CKD with Screat increase from baseline of 2.3 to 3.2 mg. 
Screat down to 3.0 mg today. Will continue IV albumin. If CKD worsens I do not think she would be a good candidate for dialysis and likely tolerate this poorly because of CM. . 
  
Pulmonary hypertension Unclear of etiology, primary vs secondary vs hepato-pulmonary due to cirrhosis. She is followed by Dr David Gomez. She is on no treatment for pulmonary HTN 
  
RV dilation and TR Secondary to pulmonary HTN 
  
Anemia This is of unclear etiology. Likely due to multifactorial causes including chronic renal failure with adequate iron stores. Will obtain iron panel to assess for iron stores. 
  
Thrombocytopenia This is secondary to cirrhosis. There is no evidence of overt bleeding. No treatment is required. The platelet count is adequate for the patient to undergo procedures without the need for platelet transfusion or platelet growth factors. 
  
PHYSICAL EXAMINATION: 
VS: per nursing note General:  No acute distress. Eyes:  Sclera anicteric. ENT:  No oral lesions. Thyroid normal. 
Nodes:  No adenopathy. Skin:  No spider angiomata. No jaundice. Respiratory:  Lungs clear to auscultation. Cardiovascular:  Regular heart rate. Murmur present. Abdomen:  Distended with obvious ascites. Non-tender. No rebound. Extremities:  4+ lower extremity edema to hips. Neurologic:  Alert and oriented. Cranial nerves grossly intact. No asterixis. 
  
LABORATORY: 
Results for Lucille Quinteros (MRN [id number]) as of 9/15/2018 14:03 Ref. Range 8/27/2018 11:30 8/27/2018 14:50 9/14/2018 14:00 9/15/2018 01:41 WBC Latest Ref Range: 3.6 - 11.0 K/uL 7.5  8.7 HGB Latest Ref Range: 11.5 - 16.0 g/dL 11.3 (L)  12.3 PLATELET Latest Ref Range: 150 - 400 K/uL 76 (L)  91 (L) INR Latest Ref Range: <1.2    1.3 (H) Sodium Latest Ref Range: 136 - 145 mmol/L 138  134 (L) 134 (L) Potassium Latest Ref Range: 3.5 - 5.1 mmol/L 4.3  5.2 (H) 5.1 Chloride Latest Ref Range: 97 - 108 mmol/L 104  100 103 CO2 Latest Ref Range: 21 - 32 mmol/L 24  24 22 Glucose Latest Ref Range: 65 - 100 mg/dL 127 (H)  103 (H) 107 (H) BUN Latest Ref Range: 6 - 20 MG/DL 42 (H)  52 (H) 55 (H) Creatinine Latest Ref Range: 0.55 - 1.02 MG/DL 2.32 (H)  3.20 (H) 3.08 (H) Bilirubin, total Latest Ref Range: 0.2 - 1.0 MG/DL 5.4 (H)  6.6 (H) Albumin Latest Ref Range: 3.5 - 5.0 g/dL 2.8 (L)  2.8 (L) ALT (SGPT) Latest Ref Range: 12 - 78 U/L 32  33 AST Latest Ref Range: 15 - 37 U/L 51 (H)  51 (H) Alk. phosphatase Latest Ref Range: 45 - 117 U/L 169 (H)  156 (H) Elias Villa MD 
[facility] [place] [facility] [place] [address], [address] [facility] 22. 
[phone]

## 2018-09-15 NOTE — PROGRESS NOTES
Hospitalist Progress Note Ariel Argueta MD 
Answering service: [phone] OR [phone] from in house phone Cell: [phone] Date of Service:  9/15/2018 NAME:  Madeleine Gamboa :  1950 MRN:  [id number] Admission Summary:  
 
78-year-old patient known for liver cirrhosis secondary to ROBBINS as well as hypertension, bladder cancer, that had a paracentesis done on , and then today on follow up with her primary care physician it was noted that her creatinine was 3.1 and she was sent to the hospital for further assessment and treatment. Prior to this event in July her creatinine was 1.1. Abdominal tap done on . The patient continued her Lasix at 80 mg as well as Aldactone and lactulose. The patient did not report any adverse events such as diarrhea. Her appetite has been very decreased due to the fact of increasing the abdominal girth at this time.   
  
Interval history / Subjective:  
 
Patient recounts her admission history. Stating she had felt exhausted with abdominal pain and N. Now feels \"better. \" Distended, but no pain. States she saw Dr. Jakob Rubio prior to my encounter. Daughter at the bedside. Assessment & Plan:  
 
Acute renal failure: Creatinine of 3.2 on admission. Likely hepatorenal  
-this is up from last lab check and appears to be steadily worsening 
-Renal consulted 
-avoid nephrotoxic agents Liver cirrhosis: Patient with severe ascites. -Hepatology consulted 
-may need Aspira drain placement 
-continue home lactulose 
-diuretics on hold Gastroesophageal reflux disease: continue proton pump inhibitor. History of iron deficiency anemia, multifactorial in this patient. Code status: DNR, per discussion with Dr. Jakob Rubio DVT prophylaxis: heparin Care Plan discussed with: Patient/Family and Nurse Disposition: Home w/Family and TBD Hospital Problems  Date Reviewed: 2018 Codes Class Noted POA Acute renal failure (ARF) (HCC) ICD-10-CM: N17.9 ICD-9-CM: 584.9  9/14/2018 Unknown Review of Systems:  
Pertinent items are noted in HPI. Vital Signs:  
 Last 24hrs VS reviewed since prior progress note. Most recent are: 
Visit Vitals  /79 (BP 1 Location: Left arm, BP Patient Position: At rest)  Pulse 91  Temp 98.1 °F (36.7 °C)  Resp 17  Ht 5' 4\" (1.626 m)  Wt 110.9 kg (244 lb 6.4 oz)  SpO2 97%  Breastfeeding No  
 BMI 41.95 kg/m2 Intake/Output Summary (Last 24 hours) at 09/15/18 1422 Last data filed at 09/15/18 1231 Gross per 24 hour Intake              480 ml Output                0 ml Net              480 ml Physical Examination:  
 
    
Constitutional:  No acute distress, cooperative, pleasant   
ENT:  Neck supple Resp:  CTA bilaterally. No accessory muscle use CV:  Regular rhythm, normal rate, no murmur GI:  Tense, very distended, non-tender, normoactive bowel sounds Musculoskeletal:  +++ edema Neurologic:  Moves all extremities. AAOx3 Data Review:  
 Review and/or order of clinical lab test 
Review and/or order of tests in the radiology section of CPT Review and/or order of tests in the medicine section of CPT Labs:  
 
Recent Labs  
   09/14/18 
 1400 WBC  8.7 HGB  12.3 HCT  37.2 PLT  91* Recent Labs  
   09/15/18 
 0141  09/14/18 
 1400 NA  134*  134* K  5.1  5.2*  
CL  103  100 CO2  22  24 BUN  55*  52* CREA  3.08*  3.20* GLU  107*  103* CA  9.8  10.4* Recent Labs  
   09/14/18 
 1400 SGOT  51* ALT  33 AP  156* TBILI  6.6* TP  7.2 ALB  2.8*  
GLOB  4.4* LPSE  591* No results for input(s): INR, PTP, APTT in the last 72 hours. No lab exists for component: INREXT No results for input(s): FE, TIBC, PSAT, FERR in the last 72 hours. Lab Results Component Value Date/Time  Folate 12.7 03/26/2018 01:40 PM  
  
 No results for input(s): PH, PCO2, PO2 in the last 72 hours. No results for input(s): CPK, CKNDX, TROIQ in the last 72 hours. No lab exists for component: CPKMB No results found for: CHOL, CHOLX, CHLST, CHOLV, HDL, LDL, LDLC, DLDLP, TGLX, TRIGL, TRIGP, CHHD, CHHDX No results found for: Yahir Arvizu Lab Results Component Value Date/Time Color DARK YELLOW 09/14/2018 02:00 PM  
 Appearance TURBID (A) 09/14/2018 02:00 PM  
 Specific gravity 1.012 09/14/2018 02:00 PM  
 pH (UA) 5.0 09/14/2018 02:00 PM  
 Protein NEGATIVE  09/14/2018 02:00 PM  
 Glucose NEGATIVE  09/14/2018 02:00 PM  
 Ketone NEGATIVE  09/14/2018 02:00 PM  
 Bilirubin NEGATIVE  08/27/2018 01:51 PM  
 Urobilinogen 0.2 09/14/2018 02:00 PM  
 Nitrites NEGATIVE  09/14/2018 02:00 PM  
 Leukocyte Esterase MODERATE (A) 09/14/2018 02:00 PM  
 Epithelial cells MODERATE (A) 09/14/2018 02:00 PM  
 Bacteria 4+ (A) 09/14/2018 02:00 PM  
 WBC 5-10 09/14/2018 02:00 PM  
 RBC 5-10 09/14/2018 02:00 PM  
 
 
 
Medications Reviewed:  
 
Current Facility-Administered Medications Medication Dose Route Frequency  albuterol (PROVENTIL VENTOLIN) nebulizer solution 2.5 mg  2.5 mg Nebulization Q4H PRN  
 lactulose (CHRONULAC) solution 20 g  20 g Oral BID  sodium chloride (NS) flush 5-10 mL  5-10 mL IntraVENous Q8H  
 sodium chloride (NS) flush 5-10 mL  5-10 mL IntraVENous PRN  
 acetaminophen (TYLENOL) tablet 650 mg  650 mg Oral Q4H PRN  
 ondansetron (ZOFRAN) injection 4 mg  4 mg IntraVENous Q4H PRN  
 heparin (porcine) injection 5,000 Units  5,000 Units SubCUTAneous Q12H  
 albumin human 25% (BUMINATE) solution 25 g  25 g IntraVENous Q6H  
 pantoprazole (PROTONIX) tablet 40 mg  40 mg Oral ACB  
 
______________________________________________________________________ EXPECTED LENGTH OF STAY: - - - 
ACTUAL LENGTH OF STAY:          1 Amber Harris MD

## 2018-09-16 NOTE — PROGRESS NOTES
[address] Lonnie Spurling, MD, FACP, [place], [address] 
Polina Carlton, GIOVANNA Mendoza, [facility]-BC   Ramirez Silva, EDUARD Horta, NP  
[address] [facility] 
[address] [facility], [address] [address] 
[place], [address] [facility] 
  [phone] 
  FAX: Consuelo Tsang 1 of [facility] 
  at [facility] 
[address] [facility], [address] 
[place], [address] Health system 
  [phone] 
  FAX: [phone]  
   
   
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  She has chronic lower edema secondary to the combination of these organ dysfunctions and has recently developed refractory ascites.  She has not developed HE.  She has small esophageal varices without bleeding. She was hospitalized for worsening anasarca, refractory ascites and JIM.   
  
In her room this evening she is alert and oriented.  She has sever ascites and anasarca to the hips.  Screat is 3.2 mg. 
   
Some back pain relieved with pain meds. She has decided to be DNR, and to have Aspira catheter placed. She does not want to do Hospice. I have told her I will have Palliative Care come by on Monday and see how they can help in her out-patient managemnt.   
  
NPO after MN for placement of ASpira catheter in AM  Probably home on Tuesday. Screat has stabilized at 3.0-3.1 mg.   
   
ASSESSMENT AND PLAN: 
Cirrhosis The diagnosis of cirrhosis is based upon liver biopsy This is secondary to ROBBINS.   
The CTP is 11.  Child class C.  The MELD score is 29. Child class C cirrhosis, refractory ascites and MELD score of 29 carry a 3 month survival of 80% and 1 year survival 50%. The patient is not a candidate for liver transplantation because of age and significant cardiac and pulmonary disease.   
   
Ascites This has recently developed. Tim Tarango did not have any ascites at the last office visit in 7/2018. This is due to cirrhosis but has most likely occurred now because of of pulmonary HTN, worsening RV dysfunction, TR and passive hepatic congestion and exacerbated by worsening CKD and JIM. This is refractory to current doses of diuretics because of kidney disease.  The patient is not a candidate for TIPS shunt because of advanced right heart heart disease and pulmonary hypertension. Will place Aspira catheter Monday. The patient was counseled regarding the need to maintain sodium restriction and the types of foods containing high amounts of sodium to be avoided. 
   
Hepatic encephalopathy This is controlled on current doses of lactulose.   
Can continue the current dose BID. No need to restrict dietary protein at this time.   
   
Anasarca This is due to a combintion of cirrhosis, CKD, JIM, pulmonary HTN, RV dysfunction, and TR. Cannut use diuretics because of CKD and JIM. Have started IV albumin to try and help JIM. 
   
CKD and JIM She has CKD which has contributed to long standing anasarca.   
She now has JIM superimposed upon CKD with Screat increase from baseline of 2.3 to 3.2 mg. 
Screat down to 3.0 mg today. Will continue IV albumin. If CKD worsens I do not think she would be a good candidate for dialysis and likely tolerate this poorly because of CM. Melda Lesch 
Pulmonary hypertension Unclear of etiology, primary vs secondary vs hepato-pulmonary due to cirrhosis. She is followed by Dr Lisa Murillo. She is on no treatment for pulmonary HTN 
   
RV dilation and TR Secondary to pulmonary HTN 
   
Anemia This is of unclear etiology.  Likely due to multifactorial causes including chronic renal failure with adequate iron stores.   
 Will obtain iron panel to assess for iron stores. 
   
Thrombocytopenia This is secondary to cirrhosis. There is no evidence of overt bleeding.   
No treatment is required. The platelet count is adequate for the patient to undergo procedures without the need for platelet transfusion or platelet growth factors. 
   
PHYSICAL EXAMINATION: 
VS: per nursing note General:  No acute distress. Eyes:  Sclera anicteric. ENT:  No oral lesions.  Thyroid normal. 
Nodes:  No adenopathy. Skin:  No spider angiomata.  No jaundice. Respiratory:  Lungs clear to auscultation. Cardiovascular:  Regular heart rate.  Murmur present. Abdomen:  Distended with obvious ascites.  Non-tender.  No rebound. Extremities:  4+ lower extremity edema to hips. Neurologic:  Alert and oriented.  Cranial nerves grossly intact.  No asterixis. 
   
LABORATORY: 
Results for Salena Garay (MRN [id number]) as of 9/16/2018 14:11 Ref. Range 8/27/2018 11:30 9/14/2018 14:00 9/15/2018 01:41 9/16/2018 00:42 WBC Latest Ref Range: 3.6 - 11.0 K/uL 7.5 8.7  5.3 HGB Latest Ref Range: 11.5 - 16.0 g/dL 11.3 (L) 12.3  9.4 (L) PLATELET Latest Ref Range: 150 - 400 K/uL 76 (L) 91 (L)  43 (LL) INR Latest Ref Range: <1.2   1.3 (H) Sodium Latest Ref Range: 136 - 145 mmol/L 138 134 (L) 134 (L) 133 (L) Potassium Latest Ref Range: 3.5 - 5.1 mmol/L 4.3 5.2 (H) 5.1 5.4 (H) Chloride Latest Ref Range: 97 - 108 mmol/L 104 100 103 102 CO2 Latest Ref Range: 21 - 32 mmol/L 24 24 22 19 (L) Glucose Latest Ref Range: 65 - 100 mg/dL 127 (H) 103 (H) 107 (H) 118 (H) BUN Latest Ref Range: 6 - 20 MG/DL 42 (H) 52 (H) 55 (H) 50 (H) Creatinine Latest Ref Range: 0.55 - 1.02 MG/DL 2.32 (H) 3.20 (H) 3.08 (H) 3.09 (H) Bilirubin, total Latest Ref Range: 0.2 - 1.0 MG/DL 5.4 (H) 6.6 (H)  6.3 (H) Albumin Latest Ref Range: 3.5 - 5.0 g/dL 2.8 (L) 2.8 (L)  3.5 ALT (SGPT) Latest Ref Range: 12 - 78 U/L 32 33  26 AST Latest Ref Range: 15 - 37 U/L 51 (H) 51 (H)  37 Alk. phosphatase Latest Ref Range: 45 - 117 U/L 169 (H) 156 (H)  103 Orly Vences MD 
[facility] [place] [facility] [place] [facility] [place] [address], [address] [facility] 22. 
[phone]

## 2018-09-16 NOTE — PROGRESS NOTES
Name: Christine Heller MRN: [id number] : 1950 Assessment: 
JIM progressive over time. Cr 1.1 in July; 2.3 on  (when she had LV paracentesis; 7 Liters) and now 3.2 on admit. Suspect pre-renal from cirrhosis/LV paracentesis/diuretics with RV dysfunction/TR. Urine Na is <5 and suspect she has HRS at this point Hyperkalemia Acidosis- mild  
Cirrhosis/Ascites- due to ROBBINS Edema 
  
Plan/Recommendations: 
Advised to limit K in diet Diuretics on hold but will give Lasix 40 mg IV x 1 for kaliuresis Start NaHCO3 325 mg BID IV Albumin If renal fxn continues to get worse, then she would ideally need dialysis. Not a great candidate for dialysis due to Cirrhosis. Not a liver Tx candidate either. But she does have decent BP and as such would be able to tolerate HD if needed. Will let pt/family decide if and when we reach that point 
pts son in law (who is PA) did mention, pt will likely not choose dialysis, if it comes to that AM labs Subjective: 
Feels ok. Emotional about asking for morphine \"late\" and suffering in pain 
+abd distension/leg edema remains ROS:  
No nausea, no vomiting No chest pain, no shortness of breath Exam: 
Visit Vitals  /70 (BP 1 Location: Left arm, BP Patient Position: At rest)  Pulse 87  Temp 97.8 °F (36.6 °C)  Resp 20  
 Ht 5' 4\" (1.626 m)  Wt 110.9 kg (244 lb 6.4 oz)  SpO2 93%  Breastfeeding No  
 BMI 41.95 kg/m2 WB/WN in NAD 
+scleral icterus Clear RRR, distant Soft, distended, ascites+, NT 
2-3+ leg edema No rash Jaundice+ AOx3 Current Facility-Administered Medications Medication Dose Route Frequency Last Dose  lidocaine (LIDODERM) 5 % patch 2 Patch  2 Patch TransDERmal Q24H 2 Patch at 18 1157  oxyCODONE IR (ROXICODONE) tablet 5 mg  5 mg Oral Q6H PRN    
  furosemide (LASIX) injection 40 mg  40 mg IntraVENous ONCE    
 sodium bicarbonate tablet 325 mg  325 mg Oral BID  albuterol (PROVENTIL VENTOLIN) nebulizer solution 2.5 mg  2.5 mg Nebulization Q4H PRN    
 lactulose (CHRONULAC) solution 20 g  20 g Oral BID 20 g at 09/16/18 0837  
 sodium chloride (NS) flush 5-10 mL  5-10 mL IntraVENous Q8H 10 mL at 09/16/18 0533  sodium chloride (NS) flush 5-10 mL  5-10 mL IntraVENous PRN    
 acetaminophen (TYLENOL) tablet 650 mg  650 mg Oral Q4H  mg at 09/16/18 0016  ondansetron (ZOFRAN) injection 4 mg  4 mg IntraVENous Q4H PRN    
 heparin (porcine) injection 5,000 Units  5,000 Units SubCUTAneous Q12H 5,000 Units at 09/16/18 0534  
 albumin human 25% (BUMINATE) solution 25 g  25 g IntraVENous Q6H 25 g at 09/16/18 1157  pantoprazole (PROTONIX) tablet 40 mg  40 mg Oral ACB 40 mg at 09/16/18 0534 Labs/Data: 
 
Lab Results Component Value Date/Time WBC 5.3 09/16/2018 12:42 AM  
 HGB 9.4 (L) 09/16/2018 12:42 AM  
 HCT 28.7 (L) 09/16/2018 12:42 AM  
 PLATELET 43 (LL) 10/85/5260 12:42 AM  
 .1 (H) 09/16/2018 12:42 AM  
 
 
Lab Results Component Value Date/Time Sodium 133 (L) 09/16/2018 12:42 AM  
 Potassium 5.4 (H) 09/16/2018 12:42 AM  
 Chloride 102 09/16/2018 12:42 AM  
 CO2 19 (L) 09/16/2018 12:42 AM  
 Anion gap 12 09/16/2018 12:42 AM  
 Glucose 118 (H) 09/16/2018 12:42 AM  
 BUN 50 (H) 09/16/2018 12:42 AM  
 Creatinine 3.09 (H) 09/16/2018 12:42 AM  
 BUN/Creatinine ratio 16 09/16/2018 12:42 AM  
 GFR est AA 18 (L) 09/16/2018 12:42 AM  
 GFR est non-AA 15 (L) 09/16/2018 12:42 AM  
 Calcium 10.4 (H) 09/16/2018 12:42 AM  
 
 
Wt Readings from Last 3 Encounters:  
09/14/18 110.9 kg (244 lb 6.4 oz) 08/27/18 112.1 kg (247 lb 1 oz) 07/18/18 105.3 kg (232 lb 3.2 oz) Intake/Output Summary (Last 24 hours) at 09/16/18 1324 Last data filed at 09/16/18 3233 Gross per 24 hour Intake              600 ml Output                0 ml Net              600 ml Patient seen and examined. Chart reviewed. Labs, data and other pertinent notes reviewed in last 24 hrs. PMH/SH/FH reviewed and unchanged compared to H&P Discussed with pt/family Franco Sharp MD

## 2018-09-16 NOTE — PROGRESS NOTES
Hospitalist Progress Note Amber Harris MD 
Answering service: [phone] OR [phone] from in house phone Cell: [phone] Date of Service:  2018 NAME:  Rafita Rodriguez :  1950 MRN:  [id number] Admission Summary:  
 
70-year-old patient known for liver cirrhosis secondary to ROBBINS as well as hypertension, bladder cancer, that had a paracentesis done on , and then today on follow up with her primary care physician it was noted that her creatinine was 3.1 and she was sent to the hospital for further assessment and treatment. Prior to this event in July her creatinine was 1.1. Abdominal tap done on . The patient continued her Lasix at 80 mg as well as Aldactone and lactulose. The patient did not report any adverse events such as diarrhea. Her appetite has been very decreased due to the fact of increasing the abdominal girth at this time.   
  
Interval history / Subjective:  
 
Patient seen with family at the bedside. Got morphine overnight for back pain and is now \"zonked. \" No abdominal pain, N, perhaps slightly more distension. Assessment & Plan:  
 
Acute renal failure: Creatinine of 3.2 on admission. Likely hepatorenal  
-this is up from last lab check and appears to be steadily worsening 
-Renal consulted 
-avoid nephrotoxic agents 
-holding diuretics Liver cirrhosis: Patient with severe ascites. -Hepatology consulted 
-will need Aspira drain placement tomorrow 
-continue home lactulose 
-diuretics on hold Gastroesophageal reflux disease: continue proton pump inhibitor. History of iron deficiency anemia, multifactorial in this patient. Hyperkalemia: Repeat bmp this PM and monitor. Code status: DNR, per discussion with Dr. Cm Gomez DVT prophylaxis: heparin Care Plan discussed with: Patient/Family and Nurse Disposition: Home w/Family and TBD Hospital Problems  Date Reviewed: 7/23/2018 Codes Class Noted POA Acute renal failure (ARF) (HCC) ICD-10-CM: N17.9 ICD-9-CM: 584.9  9/14/2018 Unknown Review of Systems:  
Pertinent items are noted in HPI. Vital Signs:  
 Last 24hrs VS reviewed since prior progress note. Most recent are: 
Visit Vitals  /74  Pulse 88  Temp 97.9 °F (36.6 °C)  Resp 20  
 Ht 5' 4\" (1.626 m)  Wt 110.9 kg (244 lb 6.4 oz)  SpO2 98%  Breastfeeding No  
 BMI 41.95 kg/m2 Intake/Output Summary (Last 24 hours) at 09/16/18 1343 Last data filed at 09/15/18 1903 Gross per 24 hour Intake              920 ml Output                0 ml Net              920 ml Physical Examination:  
 
    
Constitutional:  Mild distress, cooperative ENT:  Neck supple Resp:  CTA bilaterally. No accessory muscle use CV:  Regular rhythm, normal rate, no murmur GI:  Tense, very distended, non-tender, normoactive bowel sounds Musculoskeletal:  ++++ edema Neurologic:  Moves all extremities. AAOx3 Data Review:  
 Review and/or order of clinical lab test 
Review and/or order of tests in the radiology section of CPT Review and/or order of tests in the medicine section of CPT Labs:  
 
Recent Labs  
   09/16/18 
 0042  09/14/18 
 1400 WBC  5.3  8.7 HGB  9.4*  12.3 HCT  28.7*  37.2 PLT  43*  91* Recent Labs  
   09/16/18 
 0042  09/15/18 
 0141  09/14/18 
 1400 NA  133*  134*  134* K  5.4*  5.1  5.2*  
CL  102  103  100 CO2  19*  22  24 BUN  50*  55*  52* CREA  3.09*  3.08*  3.20* GLU  118*  107*  103* CA  10.4*  9.8  10.4* Recent Labs  
   09/16/18 
 0042  09/14/18 
 1400 SGOT  37  51* ALT  26  33 AP  103  156* TBILI  6.3*  6.6* TP  6.6  7.2 ALB  3.5  2.8*  
GLOB  3.1  4.4* LPSE   --   591* No results for input(s): INR, PTP, APTT in the last 72 hours.  
 
No lab exists for component: INREXT, INREXT  
 No results for input(s): FE, TIBC, PSAT, FERR in the last 72 hours. Lab Results Component Value Date/Time Folate 12.7 03/26/2018 01:40 PM  
  
No results for input(s): PH, PCO2, PO2 in the last 72 hours. No results for input(s): CPK, CKNDX, TROIQ in the last 72 hours. No lab exists for component: CPKMB No results found for: CHOL, CHOLX, CHLST, CHOLV, HDL, LDL, LDLC, DLDLP, TGLX, TRIGL, TRIGP, CHHD, CHHDX No results found for: Dinorah Man Lab Results Component Value Date/Time Color DARK YELLOW 09/14/2018 02:00 PM  
 Appearance TURBID (A) 09/14/2018 02:00 PM  
 Specific gravity 1.012 09/14/2018 02:00 PM  
 pH (UA) 5.0 09/14/2018 02:00 PM  
 Protein NEGATIVE  09/14/2018 02:00 PM  
 Glucose NEGATIVE  09/14/2018 02:00 PM  
 Ketone NEGATIVE  09/14/2018 02:00 PM  
 Bilirubin NEGATIVE  08/27/2018 01:51 PM  
 Urobilinogen 0.2 09/14/2018 02:00 PM  
 Nitrites NEGATIVE  09/14/2018 02:00 PM  
 Leukocyte Esterase MODERATE (A) 09/14/2018 02:00 PM  
 Epithelial cells MODERATE (A) 09/14/2018 02:00 PM  
 Bacteria 4+ (A) 09/14/2018 02:00 PM  
 WBC 5-10 09/14/2018 02:00 PM  
 RBC 5-10 09/14/2018 02:00 PM  
 
 
 
Medications Reviewed:  
 
Current Facility-Administered Medications Medication Dose Route Frequency  albuterol (PROVENTIL VENTOLIN) nebulizer solution 2.5 mg  2.5 mg Nebulization Q4H PRN  
 lactulose (CHRONULAC) solution 20 g  20 g Oral BID  sodium chloride (NS) flush 5-10 mL  5-10 mL IntraVENous Q8H  
 sodium chloride (NS) flush 5-10 mL  5-10 mL IntraVENous PRN  
 acetaminophen (TYLENOL) tablet 650 mg  650 mg Oral Q4H PRN  
 ondansetron (ZOFRAN) injection 4 mg  4 mg IntraVENous Q4H PRN  
 heparin (porcine) injection 5,000 Units  5,000 Units SubCUTAneous Q12H  
 albumin human 25% (BUMINATE) solution 25 g  25 g IntraVENous Q6H  
 pantoprazole (PROTONIX) tablet 40 mg  40 mg Oral ACB  
 
______________________________________________________________________ EXPECTED LENGTH OF STAY: - - - 
 ACTUAL LENGTH OF STAY:          2 Charles Tadeo MD

## 2018-09-16 NOTE — PROGRESS NOTES
Spoke with Adonay Lo on call to report platelet level of 43. No new orders received. No signs of bleeding. Will continue to monitor.

## 2018-09-17 NOTE — PROGRESS NOTES
Patient tolerated asp bria drain placement. While draining from asp bria patient began to cramp. Stopped suctioning. See output. Patient continued to cramp reported to Dr. Nile Nair. Fentanyl given for pain in recovery. Cramping eased some per daughter at bedside. TRANSFER - OUT REPORT: 
 
Verbal report given to LACI Alvarado(name) on Dianna Mckeon  being transferred to room 612(unit) for routine post - op Report consisted of patients Situation, Background, Assessment and  
Recommendations(SBAR). Information from the following report(s) procedure summary was reviewed with the receiving nurse. Lines:  
Peripheral IV 09/14/18 Left Antecubital (Active) Site Assessment Clean, dry, & intact 9/17/2018  8:15 AM  
Phlebitis Assessment 0 9/17/2018  8:15 AM  
Infiltration Assessment 0 9/17/2018  8:15 AM  
Dressing Status Clean, dry, & intact 9/17/2018  8:15 AM  
Dressing Type Transparent;Tape 9/17/2018  8:15 AM  
Hub Color/Line Status Capped 9/17/2018  8:15 AM  
Action Taken Open ports on tubing capped 9/17/2018  8:15 AM  
Alcohol Cap Used Yes 9/17/2018  8:15 AM  
  
 
Opportunity for questions and clarification was provided. Patient transported with: 
 hospital transport Asp bria catheter teaching done at bedside in [facility] [address] [facility] with patient's daughter. Daughter demonstrated connecting drainage bag to asp bria drain and activating bulb to drain. Asp bria booklet with written instructions provided.

## 2018-09-17 NOTE — PROGRESS NOTES
Bedside and Verbal shift change report given to Cleveland Whalen (oncoming nurse) by Tania Denson (offgoing nurse). Report included the following information SBAR, MAR and Recent Results. 1815 Bleeding noted on the dressing around the aspera drain insertion site. 1832 Attempted to contact IR to reach MD regarding bleeding around the drain insertion site. [address] [facility] with Bernice Wallis ([phone]) with angio. Bernice Wallis recommended replacing the dressing and contact the hospitalist. 
 
Julia Gavin was notified. CBC was ordered.

## 2018-09-17 NOTE — PROGRESS NOTES
NUTRITION COMPLETE ASSESSMENT 
 
RECOMMENDATIONS:  
1. Continue current diet Interventions/Plan:  
Food/Nutrient Delivery:            Once pt is more awake, will gladly assist with diet for home and possible supplementation. Assessment:  
Reason for Assessment:  
[x]BPA/MST Referral  
Diet:  Regular Supplements: none Nutritionally Significant Medications: [x] Reviewed & Includes:  
Meal Intake: Patient Vitals for the past 100 hrs: 
 % Diet Eaten 09/16/18 1229 0 % 09/16/18 0835 0 % 09/15/18 1903 5 %  
09/15/18 1231 90 % 09/15/18 0936 50 % Subjective: 
Pt asleep and spoke with family. Objective: 
77year old admitted for severe ascites and anasarca to hips. PMH: HTN, ROBBINS, GERD, pulmonary HTN, bladder cancer, CHK, cirrhosis. Pt s/p Aspira catheter placed today for daily drainage. Pt with 2-3+ edema in legs, unable to tolerate food PTA with nausea and vomiting. Pt is not a candidate for dialysis. Once pt is more awake, will gladly assist with diet for home and possible supplementation. Estimated Nutrition Needs:  
Kcals/day: 1350 Kcals/day (8158-1665 kcal/day) based on dry weight Protein: 102 g (102 grams ( 1.0 gm/kg dry wt)) Fluid: 1350 ml Based On: Kcal/kg - specify (Comment) Weight Used: IBW Pt expected to meet estimated nutrient needs:  []   Yes     []  No  [x] Unable to predict at this time Nutrition Diagnosis:  
1. Increased energy expendeture related to altererd GI function  as evidenced by nausea and vomiting PTA. Goals:   
 Consume 50%-75% meals x 5-7 days. Monitoring & Evaluation: - Total energy intake - Weight/weight change, Electrolyte and renal profile Previous Nutrition Goals Met: N/A Previous Recommendations: N/A Education & Discharge Needs: 
 [x] None Identified 
 [] Identified and addressed [x] Participated in care plan, discharge planning, and/or interdisciplinary rounds Cultural, Jainism and ethnic food preferences identified: 
 None Skin Integrity: [x]Intact  []Other Edema: []None [x]Other (removed 5 liters of fluid and 2-3+ leg edema) Food Allergies: [x]None []Other Anthropometrics:   
Weight Loss Metrics 9/14/2018 8/27/2018 7/18/2018 6/18/2018 6/5/2018 5/23/2018 5/15/2018 Today's Wt 244 lb 6.4 oz 247 lb 1 oz 232 lb 3.2 oz 233 lb 12.8 oz 225 lb 225 lb 254 lb BMI 41.95 kg/m2 42.41 kg/m2 41.13 kg/m2 41.42 kg/m2 39.86 kg/m2 38.62 kg/m2 43.6 kg/m2 Last 3 Recorded Weights in this Encounter 09/14/18 1345 Weight: 110.9 kg (244 lb 6.4 oz) Weight Source: Standing scale (comment) Height: 5' 4\" (162.6 cm), Body mass index is 41.95 kg/(m^2). IBW : 54.4 kg (120 lb), % IBW (Calculated): 203.67 % 
 ,   
 
Labs:  Lab Results Component Value Date/Time Sodium 137 09/17/2018 01:13 AM  
 Potassium 5.4 (H) 09/17/2018 01:13 AM  
 Chloride 103 09/17/2018 01:13 AM  
 CO2 20 (L) 09/17/2018 01:13 AM  
 Glucose 99 09/17/2018 01:13 AM  
 BUN 50 (H) 09/17/2018 01:13 AM  
 Creatinine 3.07 (H) 09/17/2018 01:13 AM  
 Calcium 10.4 (H) 09/17/2018 01:13 AM  
 Magnesium 2.0 06/06/2018 03:48 AM  
 Albumin 3.5 09/16/2018 12:42 AM  
 
No results found for: HBA1C, HGBE8, WIV4NKLB, WZZ8FIXM Lab Results Component Value Date/Time Glucose 99 09/17/2018 01:13 AM  
  
Lab Results Component Value Date/Time ALT (SGPT) 26 09/16/2018 12:42 AM  
 AST (SGOT) 37 09/16/2018 12:42 AM  
 Alk.  phosphatase 103 09/16/2018 12:42 AM  
 Bilirubin, direct 0.94 (H) 07/18/2018 12:00 AM  
 Bilirubin, total 6.3 (H) 09/16/2018 12:42 AM  
  
 
Rich Segovia RD

## 2018-09-17 NOTE — CDMP QUERY
There is documentation of Acute renal failure: Creatinine of 3.2 on admission. Likely hepatorenal.. Katherene Means Katherene Means Can this be further specified as:  
 
=> Hepatorenal syndrome (POA) in the setting of the liver cirrhosis with acute elevation of Cr 3.20 and ascites requiring Nephro consult, diuretics on hold, plan for drain placement 
=> Other explanation of clinical findings 
=> Clinically Undetermined (no explanation for clinical findings) The medical record reflects the following:   
 
Risk Factors:  Liver cirrhosis, ascites-post paracentesis Clinical Indicators:  Pt adm with acute renal failure (Cr. 3.20), liver cirrhosis, ascites. Treatment:  Nephro consult, diuretics on hold, plan for drain placement Please clarify and document your clinical opinion in the progress notes and discharge summary including the definitive and/or presumptive diagnosis, (suspected or probable), related to the above clinical findings. Please include clinical findings supporting your diagnosis. Thank you, Vikash Nichols, RN, BSN, [facility], [address] [place] 
[phone]

## 2018-09-17 NOTE — PROGRESS NOTES
Name: Annia Winston MRN: [id number] : 1950 Assessment: 
JIM progressive over time. Cr 1.1 in July; 2.3 on  (when she had LV paracentesis; 7 Liters) and now 3.2 on admit. Suspect pre-renal from cirrhosis/LV paracentesis/diuretics with RV dysfunction/TR. Urine Na is <5 and suspect she has HRS at this point Hyperkalemia Acidosis- mild  
Cirrhosis/Ascites- due to ROBBINS Edema 
  
Plan/Recommendations: 
Repeat Urine Na Advised to limit K in diet Sodium bicarbonate IV Albumin If renal fxn continues to get worse, then she would ideally need dialysis. Not a  candidate for dialysis due to Cirrhosis/High suspicion for HRS . Not a liver Tx candidate either. Palliative Care consultation in place. AM labs Subjective: 
Nausea/ emesis x1 yesterday. AMS per daughter 
+abd distension/leg edema remains ROS:  
No chest pain, no shortness of breath Exam: 
Visit Vitals  /65  Pulse 87  Temp 98.8 °F (37.1 °C)  Resp 20  
 Ht 5' 4\" (1.626 m)  Wt 110.9 kg (244 lb 6.4 oz)  SpO2 94%  Breastfeeding No  
 BMI 41.95 kg/m2 WB/WN in NAD 
+scleral icterus Clear RRR, distant Soft, distended, ascites+, NT 
2-3+ leg edema No rash Jaundice+ AOx3 Current Facility-Administered Medications Medication Dose Route Frequency Last Dose  lidocaine (LIDODERM) 5 % patch 2 Patch  2 Patch TransDERmal Q24H 2 Patch at 18 1157  oxyCODONE IR (ROXICODONE) tablet 5 mg  5 mg Oral Q6H PRN    
 sodium bicarbonate tablet 325 mg  325 mg Oral  mg at 18 1803  
 albuterol (PROVENTIL VENTOLIN) nebulizer solution 2.5 mg  2.5 mg Nebulization Q4H PRN    
 lactulose (CHRONULAC) solution 20 g  20 g Oral BID 20 g at 18 1803  
 sodium chloride (NS) flush 5-10 mL  5-10 mL IntraVENous Q8H 10 mL at 18 5236  sodium chloride (NS) flush 5-10 mL  5-10 mL IntraVENous PRN    
 acetaminophen (TYLENOL) tablet 650 mg  650 mg Oral Q4H  mg at 09/17/18 0342  ondansetron (ZOFRAN) injection 4 mg  4 mg IntraVENous Q4H PRN 4 mg at 09/16/18 1856  heparin (porcine) injection 5,000 Units  5,000 Units SubCUTAneous Q12H Stopped at 09/16/18 1803  
 albumin human 25% (BUMINATE) solution 25 g  25 g IntraVENous Q6H 25 g at 09/17/18 4896  pantoprazole (PROTONIX) tablet 40 mg  40 mg Oral ACB Stopped at 09/17/18 0730 Labs/Data: 
 
Lab Results Component Value Date/Time WBC 5.2 09/17/2018 01:13 AM  
 HGB 9.2 (L) 09/17/2018 01:13 AM  
 HCT 28.4 (L) 09/17/2018 01:13 AM  
 PLATELET 42 (LL) 58/52/6243 01:13 AM  
 .4 (H) 09/17/2018 01:13 AM  
 
 
Lab Results Component Value Date/Time Sodium 137 09/17/2018 01:13 AM  
 Potassium 5.4 (H) 09/17/2018 01:13 AM  
 Chloride 103 09/17/2018 01:13 AM  
 CO2 20 (L) 09/17/2018 01:13 AM  
 Anion gap 14 09/17/2018 01:13 AM  
 Glucose 99 09/17/2018 01:13 AM  
 BUN 50 (H) 09/17/2018 01:13 AM  
 Creatinine 3.07 (H) 09/17/2018 01:13 AM  
 BUN/Creatinine ratio 16 09/17/2018 01:13 AM  
 GFR est AA 18 (L) 09/17/2018 01:13 AM  
 GFR est non-AA 15 (L) 09/17/2018 01:13 AM  
 Calcium 10.4 (H) 09/17/2018 01:13 AM  
 
 
Wt Readings from Last 3 Encounters:  
09/14/18 110.9 kg (244 lb 6.4 oz) 08/27/18 112.1 kg (247 lb 1 oz) 07/18/18 105.3 kg (232 lb 3.2 oz) Intake/Output Summary (Last 24 hours) at 09/17/18 0815 Last data filed at 09/17/18 7891 Gross per 24 hour Intake              280 ml Output              150 ml Net              130 ml Patient seen and examined. Chart reviewed. Labs, data and other pertinent notes reviewed in last 24 hrs. PMH/SH/FH reviewed and unchanged compared to H&P Discussed with pt/family Kathy Cox MD

## 2018-09-17 NOTE — PROGRESS NOTES
TRANSFER - IN REPORT: 
 
Verbal report received from [facility]) on Dorinda Sas  being received from Angio(unit) for routine post - op Report consisted of patients Situation, Background, Assessment and  
Recommendations(SBAR). Information from the following report(s) SBAR, Procedure Summary, Intake/Output and MAR was reviewed with the receiving nurse. Opportunity for questions and clarification was provided. Assessment completed upon patients arrival to unit and care assumed.

## 2018-09-17 NOTE — PALLIATIVE CARE
Ms. Princess rS is a 77year old woman with a history significant for HTN, ROBBINS, GERD, pulmonary HTN, bladder cancer, RV dilation and TR. She was admitted from PCP office for abnormal labs, namely worsening renal function (Cr was 1.1 in July; 2.3 on 8/27 and 3.1 on admission). Patient recently developed refractory ascites; had first paracentesis on 8/27. Patient with Child C; CTP of 11; MELD score of 29. Dr. Dimple Allison has informed patient and family of a prognosis of likely less than a year; recommendation for placement of aspira drain and hospice. Patient and family not quite ready to embrace hospice,  
but in agreement with DNR. Patient and family are also aware of potential need for HD, though family does not believe she would choose this treatment. Patient had aspira drain placed today and is willing to meet with Palliative before her discharge tomorrow. We are unable to see patient today. Will follow-up in the morning.

## 2018-09-17 NOTE — CDMP QUERY
Patient is noted to have a BMI of 41.95 kg/m 2. Please clarify if this patient is:  
 
=>Morbidly obese 
=>Obese =>Overweight 
=>Other explanation of clinical findings =>Unable to determine (no explanation for clinical findings) Presentation: Ht: 5' 4\" (1.626 m) & Wt: 110.9 kg (244 lb 6.4 oz) = BMI: 41.95 kg/m 2 Please clarify and document your clinical opinion in the progress notes and discharge summary, including the definitive and or presumptive diagnosis, (suspected or probable), related to the above clinical findings. Please include clinical findings supporting your diagnosis. Thank you, Vikas Erickson RN, BSN, [facility] [address], [address] [place] 
[phone]

## 2018-09-17 NOTE — PROGRESS NOTES
[address] [facility] Governor MD Chester, FACP, Cite Claude Maki, [address] 
Luna Riley, EDUARD Chou, PAMateoC 
TANVIP, ACNP-BC   Ramirez Pop, EDUARD Gilbert, EDUARD  
[address] [facility] 
[address] [facility], [address] 
[place] [address], [address] 
  [phone] 
  FAX: Consuelo Tsang 1 of [facility]2 [facility],B-1 
  at [facility] 
[address] [facility], [address] 
[address], [address] [facility] 
  [phone] 
  FAX: [phone]  
   
   
HEPATOLOGY PROGRESS NOTE The patient is a 77 y.o.  female with cirrhosis secondary to ROBBINS, CKD with baseline Screat of 2.32 mg, pulmonary HTN, RV dilation and TR.  She has chronic lower edema secondary to the combination of these organ dysfunctions and has recently developed refractory ascites. She has not developed HE.  She has small esophageal varices without bleeding. She was hospitalized for worsening anasarca, refractory ascites and JIM.   
  
Patient has severe ascites and anasarca to the hips. Screat is 3 mg. 
   
Some back pain relieved with pain meds. She has decided to be DNR, and to have Aspira catheter placed. She does not want to do Hospice. Consulted palliative care today to see how they can help in her outpatient [address] has stabilized at 3.0-3.1 mg. Aspira catheter was placed today. The plan is for patient to go home tomorrow. 
   
ASSESSMENT AND PLAN: 
Cirrhosis The diagnosis of cirrhosis is based upon liver biopsy. This is secondary to ROBBINS.   
The CTP is 11. Child class C. The MELD score is 29. Child class C cirrhosis, refractory ascites and MELD score of 29 carry a 3 month survival of 80% and 1 year survival 50%.  
The patient is not a candidate for liver transplantation because of age and significant cardiac and pulmonary disease.   
   
Ascites This has recently developed. She did not have any ascites at the last office visit in 7/2018. This is due to cirrhosis but has most likely occurred now because of of pulmonary HTN, worsening RV dysfunction, TR and passive hepatic congestion and exacerbated by worsening CKD and JIM. This is refractory to current doses of diuretics because of kidney disease.  The patient is not a candidate for TIPS shunt because of advanced right heart heart disease and pulmonary hypertension. Aspira catheter was successfully placed today. The patient was counseled regarding the need to maintain sodium restriction and the types of foods containing high amounts of sodium to be avoided. 
   
Hepatic encephalopathy This is controlled on current doses of lactulose.   
Can continue the current dose BID. No need to restrict dietary protein at this time.   
   
Anasarca This is due to a combintion of cirrhosis, CKD, JIM, pulmonary HTN, RV dysfunction, and TR. Cannut use diuretics because of CKD and JIM. Have started IV albumin to try and help JIM. 
   
CKD and JIM She has CKD which has contributed to long standing anasarca.   
She now has JIM superimposed upon CKD with Screat increase from baseline of 2.3 to 3.2 mg. 
Screat remains around 3.0 mg today. Will continue IV albumin. If CKD worsens I do not think she would be a good candidate for dialysis and likely tolerate this poorly because of CM. 
   
Pulmonary hypertension Unclear of etiology, primary vs secondary vs hepato-pulmonary due to cirrhosis. She is followed by Dr Kevin Herbert. She is on no treatment for pulmonary HTN 
   
RV dilation and TR Secondary to pulmonary HTN 
   
Anemia This is of unclear etiology. Likely due to multifactorial causes including chronic renal failure with adequate iron stores.   
Will obtain iron panel to assess for iron stores. 
   
Thrombocytopenia This is secondary to cirrhosis. There is no evidence of overt bleeding.   
No treatment is required. The platelet count is adequate for the patient to undergo procedures without the need for platelet transfusion or platelet growth factors. 
   
PHYSICAL EXAMINATION: 
VS: per nursing note General:  No acute distress. Eyes:  Sclera anicteric. ENT:  No oral lesions.  Thyroid normal. 
Nodes:  No adenopathy. Skin:  No spider angiomata.  No jaundice. Respiratory:  Lungs clear to auscultation. Cardiovascular:  Regular heart rate.  Murmur present. Abdomen:  Distended with obvious ascites.  Non-tender.  No rebound. Extremities:  4+ lower extremity edema to hips. Neurologic:  Alert and oriented.  Cranial nerves grossly intact. No asterixis. 
   
LABORATORY: 
Results for Sandhya Suazo (MRN [id number]) as of 9/17/2018 15:54 Ref. Range 9/16/2018 00:42 9/16/2018 18:05 9/17/2018 01:13 Sodium Latest Ref Range: 136 - 145 mmol/L 133 (L) 137 137 Potassium Latest Ref Range: 3.5 - 5.1 mmol/L 5.4 (H) 5.3 (H) 5.4 (H) Chloride Latest Ref Range: 97 - 108 mmol/L 102 104 103 CO2 Latest Ref Range: 21 - 32 mmol/L 19 (L) 24 20 (L) Anion gap Latest Ref Range: 5 - 15 mmol/L 12 9 14 Glucose Latest Ref Range: 65 - 100 mg/dL 118 (H) 96 99 BUN Latest Ref Range: 6 - 20 MG/DL 50 (H) 54 (H) 50 (H) Creatinine Latest Ref Range: 0.55 - 1.02 MG/DL 3.09 (H) 3.04 (H) 3.07 (H) BUN/Creatinine ratio Latest Ref Range: 12 - 20   16 18 16 Calcium Latest Ref Range: 8.5 - 10.1 MG/DL 10.4 (H) 10.1 10.4 (H) GFR est non-AA Latest Ref Range: >60 ml/min/1.73m2 15 (L) 15 (L) 15 (L)  
GFR est AA Latest Ref Range: >60 ml/min/1.73m2 18 (L) 18 (L) 18 (L) Bilirubin, total Latest Ref Range: 0.2 - 1.0 MG/DL 6.3 (H) Protein, total Latest Ref Range: 6.4 - 8.2 g/dL 6.6 Albumin Latest Ref Range: 3.5 - 5.0 g/dL 3.5 Globulin Latest Ref Range: 2.0 - 4.0 g/dL 3.1 A-G Ratio Latest Ref Range: 1.1 - 2.2   1.1 ALT (SGPT) Latest Ref Range: 12 - 78 U/L 26 AST Latest Ref Range: 15 - 37 U/L 37 Alk. phosphatase Latest Ref Range: 45 - 117 U/L 103 Ermias Urbina, EDUARD [facility] [place] of [address], [place] [address], [address] Chayito Davidson, [address] Ph: [phone] Fax: [phone]

## 2018-09-17 NOTE — PROGRESS NOTES
Hospitalist Progress Note Cy José MD 
Answering service: [phone] OR [phone] from in house phone Cell: [phone] Date of Service:  2018 NAME:  Emmanuel Sandoval :  1950 MRN:  [id number] Admission Summary:  
 
57-year-old patient known for liver cirrhosis secondary to ROBBINS as well as hypertension, bladder cancer, that had a paracentesis done on , and then today on follow up with her primary care physician it was noted that her creatinine was 3.1 and she was sent to the hospital for further assessment and treatment. Prior to this event in July her creatinine was 1.1. Abdominal tap done on . The patient continued her Lasix at 80 mg as well as Aldactone and lactulose. The patient did not report any adverse events such as diarrhea. Her appetite has been very decreased due to the fact of increasing the abdominal girth at this time.   
  
Interval history / Subjective:  
 
Patient seen with family at the bedside. They are all tearful. State they just finished talking to Nephro and are awaiting visit by Palliative Care. No abdominal pain, N. Has more distension. Aspira drain planned for today. Assessment & Plan:  
 
Acute renal failure: Creatinine of 3.2 on admission. Likely hepatorenal  
-this is up from last lab check and appears to be steadily worsening 
-Renal consulted 
-not a good candidate for dialysis 
-avoid nephrotoxic agents 
-holding diuretics 
-Palliative consulted Liver cirrhosis: Patient with severe ascites. -Hepatology consulted 
-will need Aspira drain placement today 
-continue home lactulose 
-diuretics on hold Gastroesophageal reflux disease: Continue proton pump inhibitor. History of iron deficiency anemia, multifactorial in this patient. Hyperkalemia: Stable - from renal failure. Code status: DNR, per discussion with Dr. Pieter Osborne DVT prophylaxis: heparin Care Plan discussed with: Patient/Family and Nurse Disposition: ?hospice Hospital Problems  Date Reviewed: 7/23/2018 Codes Class Noted POA Acute renal failure (ARF) (HCC) ICD-10-CM: N17.9 ICD-9-CM: 584.9  9/14/2018 Unknown Review of Systems:  
Pertinent items are noted in HPI. Vital Signs:  
 Last 24hrs VS reviewed since prior progress note. Most recent are: 
Visit Vitals  /65  Pulse 87  Temp 98.8 °F (37.1 °C)  Resp 20  
 Ht 5' 4\" (1.626 m)  Wt 110.9 kg (244 lb 6.4 oz)  SpO2 94%  Breastfeeding No  
 BMI 41.95 kg/m2 Intake/Output Summary (Last 24 hours) at 09/17/18 0801 Last data filed at 09/17/18 9616 Gross per 24 hour Intake              280 ml Output              150 ml Net              130 ml Physical Examination:  
 
    
Constitutional:  Mild distress, cooperative ENT:  Neck supple Resp:  CTA bilaterally. No accessory muscle use CV:  Regular rhythm, normal rate, no murmur GI:  Tense, very distended, non-tender, normoactive bowel sounds Musculoskeletal:  ++++ edema Neurologic:  Moves all extremities. Alert, but groggy Data Review:  
 Review and/or order of clinical lab test 
Review and/or order of tests in the radiology section of CPT Review and/or order of tests in the medicine section of CPT Labs:  
 
Recent Labs  
   09/17/18 
 0113  09/16/18 
 3580 WBC  5.2  5.3 HGB  9.2*  9.4* HCT  28.4*  28.7* PLT  42*  43* Recent Labs  
   09/17/18 
 0113  09/16/18 
 1805  09/16/18 
 0042 NA  137  137  133*  
K  5.4*  5.3*  5.4*  
CL  103  104  102 CO2  20*  24  19* BUN  50*  54*  50* CREA  3.07*  3.04*  3.09* GLU  99  96  118* CA  10.4*  10.1  10.4* Recent Labs  
   09/16/18 
 0042  09/14/18 
 1400 SGOT  37  51* ALT  26  33 AP  103  156* TBILI  6.3*  6.6* TP  6.6  7.2 ALB  3.5  2.8*  
GLOB  3.1  4.4* LPSE   --   591* No results for input(s): INR, PTP, APTT in the last 72 hours. No lab exists for component: INREXT, INREXT No results for input(s): FE, TIBC, PSAT, FERR in the last 72 hours. Lab Results Component Value Date/Time Folate 12.7 03/26/2018 01:40 PM  
  
No results for input(s): PH, PCO2, PO2 in the last 72 hours. No results for input(s): CPK, CKNDX, TROIQ in the last 72 hours. No lab exists for component: CPKMB No results found for: CHOL, CHOLX, CHLST, CHOLV, HDL, LDL, LDLC, DLDLP, TGLX, TRIGL, TRIGP, CHHD, CHHDX No results found for: [facility] Lab Results Component Value Date/Time Color DARK YELLOW 09/14/2018 02:00 PM  
 Appearance TURBID (A) 09/14/2018 02:00 PM  
 Specific gravity 1.012 09/14/2018 02:00 PM  
 pH (UA) 5.0 09/14/2018 02:00 PM  
 Protein NEGATIVE  09/14/2018 02:00 PM  
 Glucose NEGATIVE  09/14/2018 02:00 PM  
 Ketone NEGATIVE  09/14/2018 02:00 PM  
 Bilirubin NEGATIVE  08/27/2018 01:51 PM  
 Urobilinogen 0.2 09/14/2018 02:00 PM  
 Nitrites NEGATIVE  09/14/2018 02:00 PM  
 Leukocyte Esterase MODERATE (A) 09/14/2018 02:00 PM  
 Epithelial cells MODERATE (A) 09/14/2018 02:00 PM  
 Bacteria 4+ (A) 09/14/2018 02:00 PM  
 WBC 5-10 09/14/2018 02:00 PM  
 RBC 5-10 09/14/2018 02:00 PM  
 
 
 
Medications Reviewed:  
 
Current Facility-Administered Medications Medication Dose Route Frequency  lidocaine (LIDODERM) 5 % patch 2 Patch  2 Patch TransDERmal Q24H  
 oxyCODONE IR (ROXICODONE) tablet 5 mg  5 mg Oral Q6H PRN  
 sodium bicarbonate tablet 325 mg  325 mg Oral BID  albuterol (PROVENTIL VENTOLIN) nebulizer solution 2.5 mg  2.5 mg Nebulization Q4H PRN  
 lactulose (CHRONULAC) solution 20 g  20 g Oral BID  sodium chloride (NS) flush 5-10 mL  5-10 mL IntraVENous Q8H  
 sodium chloride (NS) flush 5-10 mL  5-10 mL IntraVENous PRN  
 acetaminophen (TYLENOL) tablet 650 mg  650 mg Oral Q4H PRN  
 ondansetron (ZOFRAN) injection 4 mg  4 mg IntraVENous Q4H PRN  
  heparin (porcine) injection 5,000 Units  5,000 Units SubCUTAneous Q12H  
 albumin human 25% (BUMINATE) solution 25 g  25 g IntraVENous Q6H  
 pantoprazole (PROTONIX) tablet 40 mg  40 mg Oral ACB  
 
______________________________________________________________________ EXPECTED LENGTH OF STAY: - - - 
ACTUAL LENGTH OF STAY:          3 Diamante Ellison MD

## 2018-09-18 NOTE — PROGRESS NOTES
Hospitalist Progress Note Dao Alcala MD 
Answering service: [phone] OR [phone] from in house phone Cell: [phone] Date of Service:  2018 NAME:  Panchito Wang :  1950 MRN:  [id number] Admission Summary:  
 
59-year-old patient known for liver cirrhosis secondary to ROBBINS as well as hypertension, bladder cancer, that had a paracentesis done on , and then today on follow up with her primary care physician it was noted that her creatinine was 3.1 and she was sent to the hospital for further assessment and treatment. Prior to this event in July her creatinine was 1.1. Abdominal tap done on . The patient continued her Lasix at 80 mg as well as Aldactone and lactulose. The patient did not report any adverse events such as diarrhea. Her appetite has been very decreased due to the fact of increasing the abdominal girth at this time.   
  
Interval history / Subjective:  
 
Patient was sleeping this afternoon but woke up later. Denied any pain, states that she just feels cold. Assessment & Plan:  
 
Patient is enrolled to hospice, no escalation of care. Acute renal failure: Creatinine of 3.2 on admission. 
-Pre renal Vs hepatorenal (most likely) -Renal consulted 
-Initially received IV albumin and was on sodium bicarb tabs 
-not a good candidate for dialysis 
-avoid nephrotoxic agents 
-holding diuretics 
-Palliative consulted - family decided to transition the patient to hospice. They are planning to take her home. Liver cirrhosis due ROBBINS 
: Patient with severe ascites. -Hepatology consulted - Aspira drain placed yesterday. 
-continue home lactulose 
-diuretics on hold Gastroesophageal reflux disease: Continue proton pump inhibitor. History of iron deficiency anemia, multifactorial in this patient. Hyperkalemia: Stable - from renal failure. Code status: DNR, per discussion with Dr. Kaylynn Solano DVT prophylaxis:heparin Care Plan discussed with: Patient/Family and Nurse Disposition: home with hospice Hospital Problems  Date Reviewed: 7/23/2018 Codes Class Noted POA Acute renal failure (ARF) (HCC) ICD-10-CM: N17.9 ICD-9-CM: 584.9  9/14/2018 Unknown Review of Systems:  
Pertinent items are noted in HPI. Vital Signs:  
 Last 24hrs VS reviewed since prior progress note. Most recent are: 
Visit Vitals  /70 (BP 1 Location: Left arm, BP Patient Position: At rest)  Pulse 94  Temp 98 °F (36.7 °C)  Resp 16  
 Ht 5' 4\" (1.626 m)  Wt 110.9 kg (244 lb 6.4 oz)  SpO2 92%  Breastfeeding No  
 BMI 41.95 kg/m2 Intake/Output Summary (Last 24 hours) at 09/18/18 [facility] Last data filed at 09/18/18 1388 Gross per 24 hour Intake              120 ml Output              100 ml Net               20 ml Physical Examination:  
 
    
Constitutional:  Mild distress, cooperative ENT:  Neck supple Resp:  CTA bilaterally. No accessory muscle use CV:  Regular rhythm, normal rate, no murmur GI:  Tense, very distended, non-tender, normoactive bowel sounds,aspira catheter in place. Musculoskeletal:  3+ b/l LE edema Neurologic:  Moves all extremities. Alert, but groggy Data Review:  
 Review and/or order of clinical lab test 
Review and/or order of tests in the radiology section of CPT Review and/or order of tests in the medicine section of CPT Labs:  
 
Recent Labs  
   09/18/18 
 0141 09/17/18 2000 WBC  4.7  4.2 HGB  8.0*  8.0*  
HCT  25.3*  24.8*  
PLT  41*  40* Recent Labs  
   09/18/18 
 0141 09/17/18 
 0113  09/16/18 
 1805 NA  140  137  137  
K  5.3*  5.4*  5.3*  
CL  106  103  104 CO2  24  20*  24 BUN  57*  50*  54* CREA  2.82*  3.07*  3.04* GLU  97  99  96 CA  10.0  10.4*  10.1 MG  2.5*   --    --   
PHOS  3.2   --    --   
 
Recent Labs 09/18/18 
 0141  09/16/18 
 4666 SGOT  24  37 ALT  17  26 AP  67  103 TBILI  7.9*  6.3* TP  6.5  6.6 ALB  4.9  3.5 GLOB  1.6*  3.1 No results for input(s): INR, PTP, APTT in the last 72 hours. No lab exists for component: INREXT, INREXT No results for input(s): FE, TIBC, PSAT, FERR in the last 72 hours. Lab Results Component Value Date/Time Folate 12.7 03/26/2018 01:40 PM  
  
No results for input(s): PH, PCO2, PO2 in the last 72 hours. No results for input(s): CPK, CKNDX, TROIQ in the last 72 hours. No lab exists for component: CPKMB No results found for: CHOL, CHOLX, CHLST, CHOLV, HDL, LDL, LDLC, DLDLP, TGLX, TRIGL, TRIGP, CHHD, CHHDX No results found for: [facility] Lab Results Component Value Date/Time Color DARK YELLOW 09/14/2018 02:00 PM  
 Appearance TURBID (A) 09/14/2018 02:00 PM  
 Specific gravity 1.012 09/14/2018 02:00 PM  
 pH (UA) 5.0 09/14/2018 02:00 PM  
 Protein NEGATIVE  09/14/2018 02:00 PM  
 Glucose NEGATIVE  09/14/2018 02:00 PM  
 Ketone NEGATIVE  09/14/2018 02:00 PM  
 Bilirubin NEGATIVE  08/27/2018 01:51 PM  
 Urobilinogen 0.2 09/14/2018 02:00 PM  
 Nitrites NEGATIVE  09/14/2018 02:00 PM  
 Leukocyte Esterase MODERATE (A) 09/14/2018 02:00 PM  
 Epithelial cells MODERATE (A) 09/14/2018 02:00 PM  
 Bacteria 4+ (A) 09/14/2018 02:00 PM  
 WBC 5-10 09/14/2018 02:00 PM  
 RBC 5-10 09/14/2018 02:00 PM  
 
 
 
Medications Reviewed:  
 
Current Facility-Administered Medications Medication Dose Route Frequency  bisacodyl (DULCOLAX) tablet 5 mg  5 mg Oral PRN  
 0.9% sodium chloride infusion  25 mL/hr IntraVENous CONTINUOUS  
 lidocaine (LIDODERM) 5 % patch 2 Patch  2 Patch TransDERmal Q24H  
 oxyCODONE IR (ROXICODONE) tablet 5 mg  5 mg Oral Q6H PRN  
 sodium bicarbonate tablet 325 mg  325 mg Oral BID  albuterol (PROVENTIL VENTOLIN) nebulizer solution 2.5 mg  2.5 mg Nebulization Q4H PRN  
 lactulose (CHRONULAC) solution 20 g  20 g Oral BID  
  sodium chloride (NS) flush 5-10 mL  5-10 mL IntraVENous Q8H  
 sodium chloride (NS) flush 5-10 mL  5-10 mL IntraVENous PRN  
 acetaminophen (TYLENOL) tablet 650 mg  650 mg Oral Q4H PRN  
 ondansetron (ZOFRAN) injection 4 mg  4 mg IntraVENous Q4H PRN  
 heparin (porcine) injection 5,000 Units  5,000 Units SubCUTAneous Q12H  
 albumin human 25% (BUMINATE) solution 25 g  25 g IntraVENous Q6H  
 pantoprazole (PROTONIX) tablet 40 mg  40 mg Oral ACB  
 
______________________________________________________________________ EXPECTED LENGTH OF STAY: 3d 7h 
ACTUAL LENGTH OF STAY:          4 Jasbir Elizabeth MD

## 2018-09-18 NOTE — PROGRESS NOTES
Bedside shift change report given to [address] [place] [address] Drive (oncoming nurse) by Tia Cesar (offgoing nurse). Report included the following information SBAR, Kardex, ED Summary, Intake/Output, MAR and Recent Results.

## 2018-09-18 NOTE — PALLIATIVE CARE
Palliative Medicine Social Work Jenni Olivo, NP and I met with patient; her , Jody Kim; and daughters, Lindy Gaitan and Nahid Martin. Patient was alert, but not engaging; lying with eyes closed. Reviewed conversations held with Dr. Perla Holloway regarding her limited prognosis and recommendation for hospice. Family had initially communicated they were not ready for hospice and were inquiring how Palliative could help. Informed that we did not have services that could see her in the home due to proximity. Informed of availability of clinic, but discussed as likely burdensome in her current state. Encouraged family to think more about big picture and to consider how best to support her in the context of limited life expectancy. Discussed reality of her course if discharged home with [facility] (cycle of frequent admissions; leaving worse each time; using up energy for clinic appointments). Discussed that current treatments recommendations are palliative; that a proactive approach to managing at home may help her live longer and improve quality. Discussed hospice services generally. Informed that services would be intermittent, not 24/7. Family may consider hiring or getting some extra help for . Patient's  retired in June to be her full time caregiver. Lindy Gaitan is the only child who lives locally. Talked some about her failing kidneys and informed hospice would not be continuing to draw labs to track her decline. Emphasized the focus on symptoms only; conserving energy and not coming back to the hospital.  Patient and family in agreement that hospice is the best plan. Confirmed DNR and patient's  signed DDNR at patient's request. 
 
Patient and  have been  for 50 years; have four children. She is retired from  in the school system. Coping and grief appear normal.  Plans communicated to nursing and care manager. DDNR on chart for scanning. Thank you for the opportunity to be involved in the care of Ms. Sharren Kayser and her family. Shikha Baker, JEANNIE, [facility]- Palliative Medicine  Respecting Choices ® ACP Facilitator [phone]

## 2018-09-18 NOTE — PROGRESS NOTES
[address]  Lonnie Spurling, MD, FACP, [place] [place], [address] [facility] 
Polina Carlton, GIOVANNA Mendoza, [facility]   Ramirez Silva, EDUARD Horta, EDUARD  
[address] [facility] 
[address] [facility], [address] [address] 
[place], [address] 
  [phone] 
  FAX: Consuelo Tsang 1 of [facility] 
  at [facility] 
[address], [address] 
[place], [address] [facility] 
  [phone] 
  FAX: [phone]  
   
   
HEPATOLOGY PROGRESS NOTE The patient is a 77 y.o.  female with cirrhosis secondary to ROBBINS, CKD with baseline Screat of 2.32 mg, pulmonary HTN, RV dilation and TR.  She has chronic lower edema secondary to the combination of these organ dysfunctions and has recently developed refractory ascites. She has not developed HE.  She has small esophageal varices without bleeding.  She was hospitalized for worsening anasarca, refractory ascites and JIM.   
   
Patient has severe ascites and anasarca to the hips. Screat is 3 mg. 
   
She and family have decided to go home with hospice. I agree with this decision. Aspira catheter placed yesterday. Screat has stabilized in the 3.0 mg range 
  
ASSESSMENT AND PLAN: 
Cirrhosis The diagnosis of cirrhosis is based upon liver biopsy. This is secondary to ROBBINS.   
The CTP is 11. Child class C. The MELD score is 29. Child class C cirrhosis, refractory ascites and MELD score of 29 carry a 3 month survival of 80% and 1 year survival 50%. The patient is not a candidate for liver transplantation because of age and significant cardiac and pulmonary disease.   
   
Ascites This has recently developed. She did not have any ascites at the last office visit in 7/2018. This is due to cirrhosis but has most likely occurred now because of of pulmonary HTN, worsening RV dysfunction, TR and passive hepatic congestion and exacerbated by worsening CKD and JIM. This is refractory to current doses of diuretics because of kidney disease.  The patient is not a candidate for TIPS shunt because of advanced right heart heart disease and pulmonary hypertension. Aspira catheter was successfully placed. The patient was counseled regarding the need to maintain sodium restriction and the types of foods containing high amounts of sodium to be avoided. 
   
Hepatic encephalopathy This is controlled on current doses of lactulose.   
Can continue the current dose BID. No need to restrict dietary protein at this time.   
   
Anasarca This is due to a combintion of cirrhosis, CKD, JIM, pulmonary HTN, RV dysfunction, and TR. Cannut use diuretics because of CKD and JIM. Have started IV albumin to try and help JIM. 
   
CKD and JIM She has CKD which has contributed to long standing anasarca.   
She now has JIM superimposed upon CKD with Screat increase from baseline of 2.3 to 3.2 mg. 
Screat remains around 3.0 mg today. Will continue IV albumin. If CKD worsens I do not think she would be a good candidate for dialysis and likely tolerate this poorly because of CM. 
   
Pulmonary hypertension Unclear of etiology, primary vs secondary vs hepato-pulmonary due to cirrhosis. She is followed by Dr Azeb Davis. She is on no treatment for pulmonary HTN 
   
RV dilation and TR Secondary to pulmonary HTN 
   
Anemia This is of unclear etiology. Likely due to multifactorial causes including chronic renal failure with adequate iron stores.   
Will obtain iron panel to assess for iron stores. 
   
Thrombocytopenia This is secondary to cirrhosis. There is no evidence of overt bleeding.   
No treatment is required.  
The platelet count is adequate for the patient to undergo procedures without the need for platelet transfusion or platelet growth factors. End-of-life care To go home with hospice. 
   
PHYSICAL EXAMINATION: 
VS: per nursing note General:  No acute distress. Eyes:  Sclera anicteric. ENT:  No oral lesions.  Thyroid normal. 
Nodes:  No adenopathy. Skin:  No spider angiomata.  No jaundice. Respiratory:  Lungs clear to auscultation. Cardiovascular:  Regular heart rate.  Murmur present. Abdomen:  Distended with obvious ascites.  Non-tender.  No rebound. Extremities:  4+ lower extremity edema to hips. Neurologic:  Alert and oriented.  Cranial nerves grossly intact. No asterixis. Jeet King MD 
[facility] [place] [address], [address]  [address]. 
[phone] 
 
'

## 2018-09-18 NOTE — PROGRESS NOTES
Problem: Falls - Risk of 
Goal: *Absence of Falls Document Redgie Curet Fall Risk and appropriate interventions in the flowsheet. Outcome: Progressing Towards Goal 
Fall Risk Interventions: 
  
 
  
 
Medication Interventions: Patient to call before getting OOB Elimination Interventions: Call light in reach History of Falls Interventions: Door open when patient unattended

## 2018-09-18 NOTE — PROGRESS NOTES
Bedside shift change report given to [address] [place] [address] Rl (oncoming nurse) by Karen Ventura (offgoing nurse). Report included the following information SBAR, MAR, Recent Results and Med Rec Status.

## 2018-09-18 NOTE — PROGRESS NOTES
Spiritual Care Assessment/Progress Note [address] 
 
 
NAME: Rafal Dumont      MRN: [id number] AGE: 77 y.o. SEX: female Episcopal Affiliation: [facility]  
Language: [place] 9/18/2018     Total Time (in minutes): 5 Spiritual Assessment begun in [facility] through conversation with: 
  
    [x]Patient        [x] Family    [] Friend(s) Reason for Consult: Palliative Care, Initial/Spiritual Assessment Spiritual beliefs: (Please include comment if needed) 
   [] Identifies with a lexy tradition:     
   [] Supported by a lexy community:        
   [] Claims no spiritual orientation:       
   [] Seeking spiritual identity:            
   [] Adheres to an individual form of spirituality:       
   [x] Not able to assess: pt attempting to rest                   
 
    
Identified resources for coping:  
   [] Prayer                           
   [] Music                  [] Guided Imagery 
   [] Family/friends                 [] Pet visits [] Devotional reading                         [] Unknown 
   [] Other Interventions offered during this visit: (See comments for more details) Patient Interventions: Initial/Spiritual assessment, patient floor Plan of Care: 
 
 [] Support spiritual and/or cultural needs  
 [] Support AMD and/or advance care planning process    
 [] Support grieving process 
 [] Coordinate Rites and/or Rituals  
 [] Coordination with community clergy [] No spiritual needs identified at this time 
 [] Detailed Plan of Care below (See Comments)  [] Make referral to Music Therapy 
[] Make referral to Pet Therapy    
[] Make referral to Addiction services 
[] Make referral to [facility] 
[] Make referral to Spiritual Care Partner 
[] No future visits requested       
[x] Follow up visits as needed Comments: Visited Ms. Allison Martines for initial spiritual assessment and to offer support. Pt's case discussed with Palliative IDT today and chart reviewed prior to visit. Pt had several family members at bedside. Pt and family declined  visit at this time as pt was expressing a desire to rest. Assured them of prayers and of  availability as needed/desired. Guerda Braden, Palliative

## 2018-09-18 NOTE — PROGRESS NOTES
Problem: Falls - Risk of 
Goal: *Absence of Falls Document Edgar Brunner Fall Risk and appropriate interventions in the flowsheet. Outcome: Progressing Towards Goal 
Fall Risk Interventions: 
  
 
  
 
Medication Interventions: Evaluate medications/consider consulting pharmacy Elimination Interventions: Call light in reach History of Falls Interventions: Door open when patient unattended

## 2018-09-18 NOTE — PROGRESS NOTES
Hospice Consult received and forwarded to [facility] via [facility]. CM met with [facility] of [address]). April met with patient's spouse and 2 daughters at bedside. Tentative plans are for patient discharge to home with hospice on Thursday 9/20/18. Ambulance transport to be arranged for morning transport at 10am.  Daughter will accompany patient in the ambulance. Family members will assist as caregivers in the absence of the spouse. [facility]) will service this patient. BENNETT will continue to follow. WENDY Alvarado, CRm

## 2018-09-18 NOTE — PROGRESS NOTES
Name: Rosalie Sprague MRN: [id number] : 1950 Assessment: 
JIM progressive over time. Cr 1.1 in July; 2.3 on  (when she had LV paracentesis; 7 Liters) and 3.2mg/dl on admit. Suspect pre-renal from cirrhosis/LV paracentesis/diuretics with RV dysfunction/TR vs HRS Urine Na remains <10 -> suspect she may have HRS at this point. Hyperkalemia Acidosis- mild  
Cirrhosis/Ascites- due to ROBBINS Edema 
  
Plan/Recommendations: 
Patient has decided on pursuing hospice-> seems most appropriate Answered family's questions. Will sign off . Subjective: 
No n/v.  AMS better per daughter 
+abd distension/leg edema remains-> peritoneal drain in place. ROS:  
No chest pain, no shortness of breath Exam: 
Visit Vitals  /69 (BP 1 Location: Left arm, BP Patient Position: Sitting)  Pulse 98  Temp 98 °F (36.7 °C)  Resp 16  
 Ht 5' 4\" (1.626 m)  Wt 110.9 kg (244 lb 6.4 oz)  SpO2 91%  Breastfeeding No  
 BMI 41.95 kg/m2 WB/WN in NAD 
+scleral icterus Clear RRR, distant Soft, distended, ascites+, NT, peritoneal drain 2-3+ leg edema Jaundice+ Conversive Current Facility-Administered Medications Medication Dose Route Frequency Last Dose  bisacodyl (DULCOLAX) tablet 5 mg  5 mg Oral PRN 5 mg at 18 9870  
 0.9% sodium chloride infusion  25 mL/hr IntraVENous CONTINUOUS Stopped at 18 1502  lidocaine (LIDODERM) 5 % patch 2 Patch  2 Patch TransDERmal Q24H 2 Patch at 18 1124  
 oxyCODONE IR (ROXICODONE) tablet 5 mg  5 mg Oral Q6H PRN 5 mg at 18 191  
 sodium bicarbonate tablet 325 mg  325 mg Oral  mg at 18 0945  albuterol (PROVENTIL VENTOLIN) nebulizer solution 2.5 mg  2.5 mg Nebulization Q4H PRN    
 lactulose (CHRONULAC) solution 20 g  20 g Oral BID 20 g at 18  sodium chloride (NS) flush 5-10 mL  5-10 mL IntraVENous Q8H 10 mL at 09/18/18 9808  sodium chloride (NS) flush 5-10 mL  5-10 mL IntraVENous PRN    
 acetaminophen (TYLENOL) tablet 650 mg  650 mg Oral Q4H  mg at 09/18/18 0511  
 ondansetron (ZOFRAN) injection 4 mg  4 mg IntraVENous Q4H PRN 4 mg at 09/16/18 1856  heparin (porcine) injection 5,000 Units  5,000 Units SubCUTAneous Q12H Stopped at 09/16/18 1803  
 albumin human 25% (BUMINATE) solution 25 g  25 g IntraVENous Q6H 25 g at 09/18/18 1124  pantoprazole (PROTONIX) tablet 40 mg  40 mg Oral ACB 40 mg at 09/18/18 6479 Labs/Data: 
 
Lab Results Component Value Date/Time WBC 4.7 09/18/2018 01:41 AM  
 HGB 8.0 (L) 09/18/2018 01:41 AM  
 HCT 25.3 (L) 09/18/2018 01:41 AM  
 PLATELET 41 (LL) 07/27/2564 01:41 AM  
 .4 (H) 09/18/2018 01:41 AM  
 
 
Lab Results Component Value Date/Time Sodium 140 09/18/2018 01:41 AM  
 Potassium 5.3 (H) 09/18/2018 01:41 AM  
 Chloride 106 09/18/2018 01:41 AM  
 CO2 24 09/18/2018 01:41 AM  
 Anion gap 10 09/18/2018 01:41 AM  
 Glucose 97 09/18/2018 01:41 AM  
 BUN 57 (H) 09/18/2018 01:41 AM  
 Creatinine 2.82 (H) 09/18/2018 01:41 AM  
 BUN/Creatinine ratio 20 09/18/2018 01:41 AM  
 GFR est AA 20 (L) 09/18/2018 01:41 AM  
 GFR est non-AA 17 (L) 09/18/2018 01:41 AM  
 Calcium 10.0 09/18/2018 01:41 AM  
 
 
Wt Readings from Last 3 Encounters:  
09/14/18 110.9 kg (244 lb 6.4 oz) 08/27/18 112.1 kg (247 lb 1 oz) 07/18/18 105.3 kg (232 lb 3.2 oz) Intake/Output Summary (Last 24 hours) at 09/18/18 1226 Last data filed at 09/18/18 9982 Gross per 24 hour Intake              120 ml Output             5350 ml Net            -5230 ml Patient seen and examined. Chart reviewed. Labs, data and other pertinent notes reviewed in last 24 hrs. PMH/SH/FH reviewed and unchanged compared to H&P Discussed with pt/family Miguelito Montejo MD

## 2018-09-18 NOTE — PROGRESS NOTES
Bedside and Verbal shift change report given to [address] [place] [address] (oncoming nurse) by Kirill Hutchins (offgoing nurse). Report included the following information SBAR, Kardex, Intake/Output, MAR, Recent Results and Med Rec Status.

## 2018-09-18 NOTE — CONSULTS
Palliative Medicine Consult Alejandro: [phone] ([phone]) Patient Name: Chelita Merino YOB: 1950 Date of Initial Consult: September 18, 2018 Reason for Consult: Care Decisions Requesting Provider: Amador Becker NP Primary Care Physician: BRISEYDA Crenshaw 
 
 SUMMARY:  
Chelita Merino is a 77 y.o. female admitted on 9/14/2018 from home with a diagnosis of  Acute renal failure with liver cirrhosis, refractory ascites with plans for drain placement, liver cirrhosis. PMH: HTN, ROBBINS, GERD, PH, bladder cancer, RV dilation, TR, ascites, paracentesis,  
 
Current medical issues leading to Palliative Medicine involvement include: support with care decisions given end stage disease. Social:  50 years, 4 children, worked in school system, PALLIATIVE DIAGNOSES:  
1. Hepatic Encephalopathy~ improved 2. Anasarca 3. Ascites 4. Physical Debility 5. Goals of Care PLAN:  
1. Met with the pt, spouse, 2 daughters. Explained the role of Palliative Medicine 2. Discussed options for care in detail 3. Inquired about goals at this time, support in the home, quality of life, questions and concerns 4. Discussed hospice in detail and how the service could be of benefit at this time. Reviewed other options for our outpatient services and home health and explained the limitations in detail 5. Decision made for hospice transition at home. Consult placed 6. No symptom burden 7. Will follow up 8. DDNR completed 9. See note of Faisal Stevens LCSW for additional details 10. Initial consult note routed to primary continuity provider 11. Communicated plan of care with: Palliative IDT 
 
 
 GOALS OF CARE / TREATMENT PREFERENCES:  
 
GOALS OF CARE: 
Patient/Health Care Proxy Stated Goals: Comfort TREATMENT PREFERENCES:  
Code Status: DNR Advance Care Planning: 
Advance Care Planning 9/14/2018 Patient's Healthcare Decision Maker is: Legal Next of Kin Primary Decision Maker Name Benjamin Velez Primary Decision Maker Phone Number [phone] Cell phone Primary Decision Maker Relationship to Patient Spouse Confirm Advance Directive None Patient Would Like to Complete Advance Directive No  
Does the patient have other document types - Medical Interventions: Comfort measures Other Instructions:  
Artificially Administered Nutrition: No feeding tube Other: As far as possible, the palliative care team has discussed with patient / health care proxy about goals of care / treatment preferences for patient. HISTORY:  
 
History obtained from: family, chart, team 
 
CHIEF COMPLAINT: anasarca HPI/SUBJECTIVE: The patient is:  
[x] Verbal and participatory [] Non-participatory due to:  
Pt offered no compliants Clinical Pain Assessment (nonverbal scale for severity on nonverbal patients):  
Clinical Pain Assessment Severity: 0 Activity (Movement): Lying quietly, normal position Duration: for how long has pt been experiencing pain (e.g., 2 days, 1 month, years) Frequency: how often pain is an issue (e.g., several times per day, once every few days, constant) FUNCTIONAL ASSESSMENT:  
 
Palliative Performance Scale (PPS): PPS: 30 
 
 
 PSYCHOSOCIAL/SPIRITUAL SCREENING:  
 
Palliative IDT has assessed this patient for cultural preferences / practices and a referral made as appropriate to needs (Cultural Services, Patient Advocacy, Ethics, etc.) Advance Care Planning: 
Advance Care Planning 9/14/2018 Patient's Healthcare Decision Maker is: Legal Next of Kin Primary Decision Maker Name Benjamin Velez Primary Decision Maker Phone Number [phone] Cell phone Primary Decision Maker Relationship to Patient Spouse Confirm Advance Directive None Patient Would Like to Complete Advance Directive No  
Does the patient have other document types - Any spiritual / Sabianist concerns: 
[] Yes /  [x] No 
 
Caregiver Burnout: 
 [] Yes /  [x] No /  [] No Caregiver Present Anticipatory grief assessment:  
[x] Normal  / [] Maladaptive ESAS Anxiety: Anxiety: 0 
 
ESAS Depression: Depression: 1 REVIEW OF SYSTEMS:  
 
Positive and pertinent negative findings in ROS are noted above in HPI. The following systems were [x] reviewed / [] unable to be reviewed as noted in HPI Other findings are noted below. Systems: constitutional, ears/nose/mouth/throat, respiratory, gastrointestinal, genitourinary, musculoskeletal, integumentary, neurologic, psychiatric, endocrine. Positive findings noted below. Modified ESAS Completed by: provider Fatigue: 4 Drowsiness: 0 Depression: 1 Pain: 0 Anxiety: 0 Nausea: 0 Anorexia: 4 Dyspnea: 0 Constipation: No  
  Stool Occurrence(s): 1 PHYSICAL EXAM:  
 
From RN flowsheet: 
Wt Readings from Last 3 Encounters:  
09/14/18 244 lb 6.4 oz (110.9 kg) 08/27/18 247 lb 1 oz (112.1 kg) 07/18/18 232 lb 3.2 oz (105.3 kg) Blood pressure 141/65, pulse (!) 102, temperature 98.9 °F (37.2 °C), resp. rate 18, height 5' 4\" (1.626 m), weight 244 lb 6.4 oz (110.9 kg), SpO2 92 %, not currently breastfeeding. Pain Scale 1: Numeric (0 - 10) Pain Intensity 1: 5 Pain Onset 1: pain for approximately two weeks Pain Location 1: Abdomen Pain Orientation 1: Right, Lower Pain Description 1: [place] Nugent Pain Intervention(s) 1: Medication (see MAR) Last bowel movement, if known:  
 
Constitutional: ill appearing, nad Eyes: remained closed entire time ENMT:clear nasal discharge, moist mucous membranes Cardiovascular: regular rhythm, distal pulses intact, anasarca Respiratory: breathing not labored, symmetric Gastrointestinal: +ascites, +drain intact Musculoskeletal: no deformity, no tenderness to palpation Skin: warm, dry Neurologic: following commands, moving all extremities Psychiatric: full affect, no hallucinations Other: 
 
 
 HISTORY:  
 
Active Problems: Acute renal failure (ARF) ([facility] (9/14/2018) Past Medical History:  
Diagnosis Date  Anemia  Fall at home 04/29/2018  
 fracture to right shoulder  GERD (gastroesophageal reflux disease)  H/O bone density study 10/28/2016 Osteopenia  Hypermenorrhea  Hypertension   
 no current meds  Leiomyoma of uterus, unspecified  Liver cirrhosis secondary to ROBBINS ([facility]  Menopausal syndrome  ROBBINS (nonalcoholic steatohepatitis)  Osteopenia 2016  Sleep apnea   
 cpap  Thrombocytopenia ([facility] PLT NORM 60-70 PER DAUGHTER  
 Transitional cell bladder cancer (HCC) Has been cleared by urology Past Surgical History:  
Procedure Laterality Date  COLONOSCOPY N/A 3/28/2018 COLONOSCOPY performed by Aubrey Myers MD [address]  COLONOSCOPY,DIAGNOSTIC  3/28/2018  HX HYSTEROSCOPY  03/1996  
 w/ D&C  
 HX OTHER SURGICAL  02/19/10  
 tumor in bladder removed x2 in 2010  HX OTHER SURGICAL  03/25/10 Liver Biopsy--Cirrhosis  HX OTHER SURGICAL  06/2018  
 humerus; placed plate and screws  HX OTHER SURGICAL Right 2018  
 plate and screws in right arm  HX TUBAL LIGATION    
 HX UROLOGICAL    
 tumor removed from bladder  UPPER GI ENDOSCOPY,DIAGNOSIS  3/28/2018 Family History Problem Relation Age of Onset  Diabetes Brother Type II  
 Hypertension Brother  Heart Disease Father  Heart Attack Father 64  Hypertension Father  Hypertension Mother  Osteoporosis Mother +Hip fracture  Hypertension Sister  Stroke Sister  Anesth Problems Neg Hx History reviewed, no pertinent family history. Social History Substance Use Topics  Smoking status: Never Smoker  Smokeless tobacco: Never Used  Alcohol use No  
 
Allergies Allergen Reactions  Adhesive Tape-Silicones Rash Current Facility-Administered Medications Medication Dose Route Frequency  bisacodyl (DULCOLAX) tablet 5 mg  5 mg Oral PRN  
 0.9% sodium chloride infusion  25 mL/hr IntraVENous CONTINUOUS  
 lidocaine (LIDODERM) 5 % patch 2 Patch  2 Patch TransDERmal Q24H  
 oxyCODONE IR (ROXICODONE) tablet 5 mg  5 mg Oral Q6H PRN  
 sodium bicarbonate tablet 325 mg  325 mg Oral BID  albuterol (PROVENTIL VENTOLIN) nebulizer solution 2.5 mg  2.5 mg Nebulization Q4H PRN  
 lactulose (CHRONULAC) solution 20 g  20 g Oral BID  sodium chloride (NS) flush 5-10 mL  5-10 mL IntraVENous Q8H  
 sodium chloride (NS) flush 5-10 mL  5-10 mL IntraVENous PRN  
 ondansetron (ZOFRAN) injection 4 mg  4 mg IntraVENous Q4H PRN  
 heparin (porcine) injection 5,000 Units  5,000 Units SubCUTAneous Q12H  pantoprazole (PROTONIX) tablet 40 mg  40 mg Oral ACB  
 
 
 
 LAB AND IMAGING FINDINGS:  
 
Lab Results Component Value Date/Time WBC 4.7 09/18/2018 01:41 AM  
 HGB 8.0 (L) 09/18/2018 01:41 AM  
 PLATELET 41 (LL) 70/39/0525 01:41 AM  
 
Lab Results Component Value Date/Time Sodium 140 09/18/2018 01:41 AM  
 Potassium 5.3 (H) 09/18/2018 01:41 AM  
 Chloride 106 09/18/2018 01:41 AM  
 CO2 24 09/18/2018 01:41 AM  
 BUN 57 (H) 09/18/2018 01:41 AM  
 Creatinine 2.82 (H) 09/18/2018 01:41 AM  
 Calcium 10.0 09/18/2018 01:41 AM  
 Magnesium 2.5 (H) 09/18/2018 01:41 AM  
 Phosphorus 3.2 09/18/2018 01:41 AM  
  
Lab Results Component Value Date/Time AST (SGOT) 24 09/18/2018 01:41 AM  
 Alk. phosphatase 67 09/18/2018 01:41 AM  
 Protein, total 6.5 09/18/2018 01:41 AM  
 Albumin 4.9 09/18/2018 01:41 AM  
 Globulin 1.6 (L) 09/18/2018 01:41 AM  
 
Lab Results Component Value Date/Time INR 1.6 (H) 07/18/2018 12:00 AM  
 INR (POC) 1.3 (H) 08/27/2018 02:50 PM  
 Prothrombin time 16.3 (H) 07/18/2018 12:00 AM  
 aPTT 33.0 (H) 12/16/2017 06:37 PM  
  
Lab Results Component Value Date/Time  Iron 39 03/26/2018 01:40 PM  
 TIBC 326 03/26/2018 01:40 PM  
 Iron % saturation 12 (L) 03/26/2018 01:40 PM  
 Ferritin 20 03/26/2018 01:40 PM  
  
No results found for: PH, PCO2, PO2 No components found for: [place] Lab Results Component Value Date/Time CK 71 12/16/2017 06:31 PM  
 CK - MB <1.0 12/16/2017 06:31 PM  
  
 
 
   
 
Total time: 70 minutes Counseling / coordination time, spent as noted above: 55 minutes 
> 50% counseling / coordination?: y 
 
Prolonged service was provided for  []30 min   []75 min in face to face time in the presence of the patient, spent as noted above. Time Start:  
Time End:  
Note: this can only be billed with 06821 (initial) or 93344 (follow up). If multiple start / stop times, list each separately.

## 2018-09-19 NOTE — PROGRESS NOTES
Bedside shift change report given to [facility] Kevin (oncoming nurse) by Paty Cummings (offgoing nurse). Report included the following information SBAR, Kardex, Intake/Output and MAR.

## 2018-09-19 NOTE — PROGRESS NOTES
Bedside and Verbal shift change report given to Segundo Ingram (oncoming nurse) by Kianna Long (offgoing nurse). Report included the following information SBAR, MAR and Recent Results.

## 2018-09-20 NOTE — PROGRESS NOTES
Hospitalist Progress Note Jesus Bey MD 
Answering service: [phone] OR [phone] from in house phone Cell: [phone] Date of Service:  2018 NAME:  Darek Aburto :  1950 MRN:  [id number] Admission Summary:  
 
58-year-old patient known for liver cirrhosis secondary to ROBBINS as well as hypertension, bladder cancer, that had a paracentesis done on , and then today on follow up with her primary care physician it was noted that her creatinine was 3.1 and she was sent to the hospital for further assessment and treatment. Prior to this event in July her creatinine was 1.1. Abdominal tap done on . The patient continued her Lasix at 80 mg as well as Aldactone and lactulose. The patient did not report any adverse events such as diarrhea. Her appetite has been very decreased due to the fact of increasing the abdominal girth at this time.   
  
Interval history / Subjective:  
 
Patient states that her pain is not well controlled. Discussed with patient and family that I would decrease the frequency of roxicodone. Able to tolerate food. Assessment & Plan:  
 
Patient is enrolled to hospice, no escalation of care. -will change roxicodone to Q 4 hr prn for better pain control. Acute renal failure: Creatinine of 3.2 on admission. 
-Pre renal Vs hepatorenal (most likely) -Renal consulted 
-Initially received IV albumin and was on sodium bicarb tabs 
-not a good candidate for dialysis 
-avoid nephrotoxic agents 
-holding diuretics 
-Palliative consulted - family decided to transition the patient to hospice. They are planning to take her home. Liver cirrhosis due ROBBINS 
: Patient with severe ascites. -Hepatology consulted - Aspira drain in place. 
-continue home lactulose 
-diuretics on hold Gastroesophageal reflux disease: Continue proton pump inhibitor. History of iron deficiency anemia, multifactorial in this patient. Hyperkalemia: Stable - from renal failure. Code status: DNR. DVT prophylaxis:SCDs Care Plan discussed with: Patient/Family and Nurse Disposition: home with hospice tomorrow Hospital Problems  Date Reviewed: 7/23/2018 Codes Class Noted POA Acute renal failure (ARF) (HCC) ICD-10-CM: N17.9 ICD-9-CM: 584.9  9/14/2018 Unknown Review of Systems:  
Pertinent items are noted in HPI. Vital Signs:  
 Last 24hrs VS reviewed since prior progress note. Most recent are: 
Visit Vitals  /59 (BP 1 Location: Left arm, BP Patient Position: At rest)  Pulse 98  Temp 98.2 °F (36.8 °C)  Resp 18  Ht 5' 4\" (1.626 m)  Wt 110.9 kg (244 lb 6.4 oz)  SpO2 98%  Breastfeeding No  
 BMI 41.95 kg/m2 Intake/Output Summary (Last 24 hours) at 09/19/18 2325 Last data filed at 09/19/18 4893 Gross per 24 hour Intake                0 ml Output             2000 ml Net            -2000 ml Physical Examination:  
 
    
Constitutional:  Mild distress, cooperative ENT:  Neck supple Resp:  CTA bilaterally. No accessory muscle use CV:  Regular rhythm, normal rate, no murmur GI:  Tense, very distended, non-tender, normoactive bowel sounds,aspira catheter in place. Musculoskeletal:  3+ b/l LE edema Neurologic:  Moves all extremities. Alert, but groggy Data Review:  
 
Review and/or order of tests in the medicine section of CPT Labs:  
 
Recent Labs  
   09/18/18 0141 09/17/18 2000 WBC  4.7  4.2 HGB  8.0*  8.0*  
HCT  25.3*  24.8*  
PLT  41*  40* Recent Labs  
   09/18/18 0141 09/17/18 
 0113 NA  140  137  
K  5.3*  5.4*  
CL  106  103 CO2  24  20* BUN  57*  50* CREA  2.82*  3.07* GLU  97  99 CA  10.0  10.4* MG  2.5*   --   
PHOS  3.2   --   
 
Recent Labs  
   09/18/18 0141 SGOT  24 ALT  17 AP  67 TBILI  7.9* TP  6.5 ALB  4.9  
GLOB  1.6* No results for input(s): INR, PTP, APTT in the last 72 hours. No lab exists for component: INREXT, INREXT No results for input(s): FE, TIBC, PSAT, FERR in the last 72 hours. Lab Results Component Value Date/Time Folate 12.7 03/26/2018 01:40 PM  
  
No results for input(s): PH, PCO2, PO2 in the last 72 hours. No results for input(s): CPK, CKNDX, TROIQ in the last 72 hours. No lab exists for component: CPKMB No results found for: CHOL, CHOLX, CHLST, CHOLV, HDL, LDL, LDLC, DLDLP, TGLX, TRIGL, TRIGP, CHHD, CHHDX No results found for: Kandyce Goldberg Lab Results Component Value Date/Time Color DARK YELLOW 09/14/2018 02:00 PM  
 Appearance TURBID (A) 09/14/2018 02:00 PM  
 Specific gravity 1.012 09/14/2018 02:00 PM  
 pH (UA) 5.0 09/14/2018 02:00 PM  
 Protein NEGATIVE  09/14/2018 02:00 PM  
 Glucose NEGATIVE  09/14/2018 02:00 PM  
 Ketone NEGATIVE  09/14/2018 02:00 PM  
 Bilirubin NEGATIVE  08/27/2018 01:51 PM  
 Urobilinogen 0.2 09/14/2018 02:00 PM  
 Nitrites NEGATIVE  09/14/2018 02:00 PM  
 Leukocyte Esterase MODERATE (A) 09/14/2018 02:00 PM  
 Epithelial cells MODERATE (A) 09/14/2018 02:00 PM  
 Bacteria 4+ (A) 09/14/2018 02:00 PM  
 WBC 5-10 09/14/2018 02:00 PM  
 RBC 5-10 09/14/2018 02:00 PM  
 
 
 
Medications Reviewed:  
 
Current Facility-Administered Medications Medication Dose Route Frequency  oxyCODONE IR (ROXICODONE) tablet 5 mg  5 mg Oral Q4H PRN  
 bisacodyl (DULCOLAX) tablet 5 mg  5 mg Oral PRN  
 0.9% sodium chloride infusion  25 mL/hr IntraVENous CONTINUOUS  
 lidocaine (LIDODERM) 5 % patch 2 Patch  2 Patch TransDERmal Q24H  
 sodium bicarbonate tablet 325 mg  325 mg Oral BID  albuterol (PROVENTIL VENTOLIN) nebulizer solution 2.5 mg  2.5 mg Nebulization Q4H PRN  
 lactulose (CHRONULAC) solution 20 g  20 g Oral BID  sodium chloride (NS) flush 5-10 mL  5-10 mL IntraVENous Q8H  
 sodium chloride (NS) flush 5-10 mL  5-10 mL IntraVENous PRN  
  ondansetron (ZOFRAN) injection 4 mg  4 mg IntraVENous Q4H PRN  
 heparin (porcine) injection 5,000 Units  5,000 Units SubCUTAneous Q12H  pantoprazole (PROTONIX) tablet 40 mg  40 mg Oral ACB  
 
______________________________________________________________________ EXPECTED LENGTH OF STAY: 3d 7h 
ACTUAL LENGTH OF STAY:          5 Rin Bernal MD

## 2018-09-20 NOTE — PROGRESS NOTES
Problem: Falls - Risk of 
Goal: *Absence of Falls Document Sawyer Melton Fall Risk and appropriate interventions in the flowsheet. Outcome: Progressing Towards Goal 
Fall Risk Interventions: 
  
 
  
 
Medication Interventions: Patient to call before getting OOB, Teach patient to arise slowly Elimination Interventions: Call light in reach, Patient to call for help with toileting needs History of Falls Interventions: Door open when patient unattended

## 2018-09-20 NOTE — PROGRESS NOTES
NUTRITION brief Pt to be admitted to hospice care. Visited with patient and multiple family members in room. Obtained preferences. Nutrition Services also arrived to discuss next few meal orders. Pt is comfortable and taking PO as tolerated and desired. Mostly pudding, yogurt and juice. No further nutrition intervention is appropriate at this time. Terrall Mcardle, RD, MS, CDE Pager #[phone] or [phone]

## 2018-09-20 NOTE — DISCHARGE INSTRUCTIONS
Discharge Instructions       PATIENT ID: Panchito Wang  MRN: [id number]   YOB: 1950    DATE OF ADMISSION: 9/14/2018  2:09 PM    DATE OF DISCHARGE: 9/20/2018    PRIMARY CARE PROVIDER: BRISEYDA Mathur     ATTENDING PHYSICIAN: Dao Alcala MD  DISCHARGING PROVIDER: Dao Alcala MD    To contact this individual call [phone] and ask the  to page. If unavailable ask to be transferred the Adult Hospitalist Department. DISCHARGE DIAGNOSES ROBBINS liver cirrhosis, renal failure    CONSULTATIONS: IP CONSULT TO HEPATOLOGY  IP CONSULT TO NEPHROLOGY  IP CONSULT TO PALLIATIVE CARE - PROVIDER  IP CONSULT TO HOSPITALIST    PROCEDURES/SURGERIES: * No surgery found *    PENDING TEST RESULTS:   At the time of discharge the following test results are still pending: none    FOLLOW UP APPOINTMENTS:   Follow-up Information     Follow up With Details Comments Alma Palma [address]  [phone]             ADDITIONAL CARE RECOMMENDATIONS: continue draining ascitic fluid as needed. ACTIVITY: Activity as tolerated    WOUND CARE: none    EQUIPMENT needed: arranged at home      DISCHARGE MEDICATIONS:          NOTIFY YOUR PHYSICIAN FOR ANY OF THE FOLLOWING:   Fever over 101 degrees for 24 hours. Chest pain, shortness of breath, fever, chills, nausea, vomiting, diarrhea, change in mentation, falling, weakness, bleeding. Severe pain or pain not relieved by medications. Or, any other signs or symptoms that you may have questions about. DISCHARGE MEDICATIONS:   See Medication Reconciliation Form    · It is important that you take the medication exactly as they are prescribed. · Keep your medication in the bottles provided by the pharmacist and keep a list of the medication names, dosages, and times to be taken in your wallet. · Do not take other medications without consulting your doctor.        NOTIFY [address] [place] [address] THE FOLLOWING:   Fever over 101 degrees for 24 hours. Chest pain, shortness of breath, fever, chills, nausea, vomiting, diarrhea, change in mentation, falling, weakness, bleeding. Severe pain or pain not relieved by medications. Or, any other signs or symptoms that you may have questions about.       DISPOSITION:   X Home With:   OT  PT  HH  RN       SNF/Inpatient Rehab/LTAC    Independent/assisted living    Hospice    Other:           Signed:   Teagan Zaragoza MD  9/20/2018  11:29 AM

## 2018-09-20 NOTE — PROGRESS NOTES
Occupational Therapy Screening: 
Services are not indicated at this time. [facility] based on results obtained during the nursing admission assessment. The patients chart was reviewed and the patient is not appropriate for a skilled therapy evaluation at this time. Please consult occupational therapy if any therapy needs arise. Pt to discharge today at noon. Thank you.  
 
Erum Lambert, OT

## 2018-09-20 NOTE — PROGRESS NOTES
Ambulance transport scheduled for 12pm today with [facility]. CM met with patient's spouse and daughter to advise of change in  Transport time.   Fiona Herbert, BSW, CRM

## 2018-09-20 NOTE — PROGRESS NOTES
Bedside and Verbal shift change report given to Norma Jama (oncoming nurse) by Leoncio Walsh (offgoing nurse). Report included the following information SBAR and MAR.

## 2018-09-21 NOTE — DISCHARGE SUMMARY
Discharge Summary PATIENT ID: Enid Dyer MRN: [id number] YOB: 1950 DATE OF ADMISSION: 9/14/2018  2:09 PM   
DATE OF DISCHARGE: 9/20/2018 PRIMARY CARE PROVIDER: BRISEYDA Padilla  
 
ATTENDING PHYSICIAN: Jovita Zamora MD 
 
DISCHARGING PROVIDER: Jovita Zamora MD   
To contact this individual call [phone] and ask the  to page. If unavailable ask to be transferred the Adult Hospitalist Department. CONSULTATIONS: IP CONSULT TO HEPATOLOGY 
IP CONSULT TO NEPHROLOGY PROCEDURES/SURGERIES: * No surgery found * [id number] [facility] COURSE:  
77year-old patient known for liver cirrhosis secondary to ROBBINS as well as hypertension, bladder cancer, that had a paracentesis done on 08/27, and then today on follow up with her primary care physician it was noted that her creatinine was 3.1 and she was sent to the hospital for further assessment and treatment. Aleksander Patrickss to this event in July her creatinine was 1.1.  Abdominal tap done on 08/27.  The patient continued her Lasix at 80 mg as well as Aldactone and lactulose.  The patient did not report any adverse events such as diarrhea.  Her appetite has been very decreased due to the fact of increasing the abdominal girth at this time Patient is enrolled to hospice: 
-will  continue roxicodone to Q 4 hr prn for  pain control. 
  
  
Acute renal failure:  
Creatinine of 3.2 on admission. 
-Likely  hepatorenal syndrome  In the setting of liver cirrhosis -Renal consulted 
-Initially received IV albumin and was on sodium bicarb tabs 
-not a good candidate for dialysis 
-avoid nephrotoxic agents 
-holding diuretics 
-Patient and family decided to transition care to hospice because of overall poor prognosis. 
  
Liver cirrhosis due ROBBINS 
: Patient with severe ascites. -Hepatology following - Aspira drain in place. 
-diuretics,lactulose were discontinued 
  
Gastroesophageal reflux disease: will d/c PPI 
  
 History of iron deficiency anemia, multifactorial in this patient.   
  
Hyperkalemia: - from renal failure. Morbid obesity: 
Body mass index is 41.95 kg/(m^2). PENDING TEST RESULTS:  
At the time of discharge the following test results are still pending:none FOLLOW UP APPOINTMENTS:   
Follow-up Information Follow up With Details Comments Contact Info Chris Teran, PA   [address] [place] [address] 
[phone] ADDITIONAL CARE RECOMMENDATIONS:none DIET: Regular Diet ACTIVITY: Activity as tolerated WOUND CARE:none EQUIPMENT needed: arranged DISCHARGE MEDICATIONS: 
Discharge Medication List as of 9/20/2018 11:58 AM  
  
START taking these medications Details  
oxyCODONE IR (ROXICODONE) 5 mg immediate release tablet Take 1 Tab by mouth every four (4) hours as needed. Max Daily Amount: 30 mg., Print, Disp-10 Tab, R-0  
  
  
CONTINUE these medications which have NOT CHANGED Details  
ondansetron (ZOFRAN ODT) 4 mg disintegrating tablet Take 4 mg by mouth as needed for Nausea., Historical Med  
  
albuterol (PROVENTIL VENTOLIN) 2.5 mg /3 mL (0.083 %) nebulizer solution 2.5 mg by Nebulization route every four (4) hours as needed for Wheezing., Historical Med  
  
  
STOP taking these medications  
  
 furosemide (LASIX) 40 mg tablet Comments:  
Reason for Stopping:   
   
 furosemide (LASIX) 80 mg tablet Comments:  
Reason for Stopping:   
   
 lactulose (CHRONULAC) 10 gram/15 mL solution Comments:  
Reason for Stopping:   
   
 spironolactone (ALDACTONE) 100 mg tablet Comments:  
Reason for Stopping:   
   
 omeprazole (PRILOSEC) 20 mg capsule Comments:  
Reason for Stopping:   
   
  
 
 
 
NOTIFY YOUR PHYSICIAN FOR ANY OF THE FOLLOWING:  
Fever over 101 degrees for 24 hours. Chest pain, shortness of breath, fever, chills, nausea, vomiting, diarrhea, change in mentation, falling, weakness, bleeding. Severe pain or pain not relieved by medications. Or, any other signs or symptoms that you may have questions about. DISPOSITION: 
X  Home With: 
 OT  PT  HH  RN  
  
 Long term SNF/Inpatient Rehab Independent/assisted living Hospice Other:  
 
 
PATIENT CONDITION AT DISCHARGE:  
 
Functional status Poor X Deconditioned Independent Cognition X  Lucid Forgetful Dementia Catheters/lines (plus indication) Bray PICC   
 PEG Aspira catheter Code status Full code X DNR   
 
PHYSICAL EXAMINATION AT DISCHARGE: 
  
Constitutional:  no distress, cooperative ENT:  Neck supple Resp:  CTA bilaterally. No accessory muscle use CV:  Regular rhythm, normal rate, no murmur GI:  distended,tender, normoactive bowel sounds,aspira catheter in place. Musculoskeletal:  3+ b/l LE edema Neurologic:  Moves all extremities. Alert and oriented X 3 CHRONIC MEDICAL DIAGNOSES: 
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 
ICD-9-CM: 401.9  Unknown - Present Leukopenia ICD-10-CM: D72.819 ICD-9-CM: 288.50  3/26/2018 - Present GERD (gastroesophageal reflux disease) ICD-10-CM: K21.9 ICD-9-CM: 530.81  Unknown - Present Thrombocytopenia ([facility] ICD-10-CM: D69.6 ICD-9-CM: 287.5  12/16/2017 - Present DVT (deep venous thrombosis) (HCC) ICD-10-CM: I82.409 ICD-9-CM: 453.40  12/16/2017 - Present Cirrhosis ([facility] ICD-10-CM: K74.60 ICD-9-CM: 571.5  12/16/2017 - Present  Esophageal varices (HCC) ICD-10-CM: I85.00 
 ICD-9-CM: 456.1  12/16/2017 - Present Obesity, morbid ([facility] ICD-10-CM: E66.01 
ICD-9-CM: 278.01  12/8/2017 - Present Osteopenia ICD-10-CM: M85.80 ICD-9-CM: 733.90  1/1/2016 - Present Menopausal syndrome ICD-10-CM: N95.1 ICD-9-CM: 627.2  Unknown - Present RESOLVED: Dyspnea on effort ICD-10-CM: R06.09 
ICD-9-CM: 786.09  3/26/2018 - 5/13/2018 RESOLVED: Pancytopenia ([facility] ICD-10-CM: N85.542 ICD-9-CM: 284.19  3/26/2018 - 5/13/2018 RESOLVED: Cellulitis ICD-10-CM: L03.90 ICD-9-CM: 682.9  12/16/2017 - 5/13/2018 RESOLVED: Osteoporosis screening ICD-10-CM: Z13.820 ICD-9-CM: V82.81  10/19/2016 - 5/13/2018  
   
  
 
 
25 minutes were spent with the patient on counseling and coordination of care Signed: Sabina Boykin MD 
9/21/2018 
6:41 AM

## 2018-12-03 DIAGNOSIS — D50.9 IRON DEFICIENCY ANEMIA, UNSPECIFIED IRON DEFICIENCY ANEMIA TYPE: ICD-10-CM

## 2018-12-05 NOTE — PROGRESS NOTES
Chief Complaint   Patient presents with    Follow-up     Visit Vitals    /61 (BP 1 Location: Left arm, BP Patient Position: Sitting)    Pulse 84    Temp 97.4 °F (36.3 °C) (Tympanic)    Ht 5' 4\" (1.626 m)    Wt 225 lb (102.1 kg)    SpO2 97%    BMI 38.62 kg/m2     PHQ over the last two weeks 4/20/2018   Little interest or pleasure in doing things Not at all   Feeling down, depressed or hopeless Not at all   Total Score PHQ 2 0     1. Have you been to the ER, urgent care clinic since your last visit? Hospitalized since your last visit? Yes - [facility] - Confusion -High ammonia level    2. Have you seen or consulted any other health care providers outside of the [address] [place] [address] since your last visit? Include any pap smears or colon screening.   no No

## 2021-12-13 NOTE — ED PROVIDER NOTES
12/13/2021        Edi Hernandez  [address] [place] [address]        This is to certify that Edi Hernandez has been under my care from 12/13/2021 at 7:03 PM and is excused from Basketball for 1 week due to ankle injury. He may return 12/20/21.     SIGNATURE:___________________________________________,     SONIA Wilson          [facility], Medical Office Building - Urgent Care, [address] [place] [id number] and [phone]       HPI Comments: 77 y.o. female with past medical history significant for GERD, ROBBINS, HTN, and bladder CA who presents from PCP Office via private vehicle for an evaluation of abnormal lab results. Pt reports a h/o ROBBINS, had her first paracentesis 18 days ago here in the ED. Since then, the pt reports worsening ascities, leg swelling, and overall feeling unwell. Pt reports decreased appetite and PO intake, with intermittent vomiting. Pt reports seeing her PCP this morning, had labs drawn, and was found to have a Creatinine of 3.1 and BUN of 44. Pt reports taking lasix and spironolactone, as well as lactulose. There are no other acute medical concerns at this time. Old Chart Review: Pt was seen here 8/27/18 for worsening abdominal pain and distension, had a paracentesis and discharged home. Per notes from hepatology, pt had a PCP visit today and was referred here d/t shakiness, confusion, signs of ascities, edema, tachycardia, and worsening kidney fx. Pt's creatinine was 1.1 in July, increased to 2.3 on 8/27. Social hx: Nonsmoker; No EtOH use PCP: BRISEYDA Gomes Hepatologist: Anuradha Brock MD 
 
Note written by Robert Lo, as dictated by Pauly Rothman MD 2:47 PM 
 
The history is provided by the patient and medical records. No  was used. Past Medical History:  
Diagnosis Date  Anemia  Fall at home 04/29/2018  
 fracture to right shoulder  GERD (gastroesophageal reflux disease)  H/O bone density study 10/28/2016 Osteopenia  Hypermenorrhea  Hypertension   
 no current meds  Leiomyoma of uterus, unspecified  Liver cirrhosis secondary to ROBBINS ([address]  Menopausal syndrome  ROBBINS (nonalcoholic steatohepatitis)  Osteopenia 2016  Sleep apnea   
 cpap  Thrombocytopenia ([address] PLT NORM 60-70 PER DAUGHTER  
 Transitional cell bladder cancer (HCC) Has been cleared by urology Past Surgical History: Procedure Laterality Date  COLONOSCOPY N/A 3/28/2018 COLONOSCOPY performed by Keya Uriarte MD at [facility]  COLONOSCOPY,DIAGNOSTIC  3/28/2018  HX HYSTEROSCOPY  03/1996  
 w/ D&C  
 HX OTHER SURGICAL  02/19/10  
 tumor in bladder removed x2 in 2010  HX OTHER SURGICAL  03/25/10 Liver Biopsy--Cirrhosis  HX OTHER SURGICAL  06/2018  
 humerus; placed plate and screws  HX OTHER SURGICAL Right 2018  
 plate and screws in right arm  HX TUBAL LIGATION    
 HX UROLOGICAL    
 tumor removed from bladder  UPPER GI ENDOSCOPY,DIAGNOSIS  3/28/2018 Family History:  
Problem Relation Age of Onset  Diabetes Brother Type II  
 Hypertension Brother  Heart Disease Father  Heart Attack Father 64  Hypertension Father  Hypertension Mother  Osteoporosis Mother +Hip fracture  Hypertension Sister  Stroke Sister  Anesth Problems Neg Hx Social History Social History  Marital status:  Spouse name: N/A  
 Number of children: N/A  
 Years of education: N/A Occupational History  Not on file. Social History Main Topics  Smoking status: Never Smoker  Smokeless tobacco: Never Used  Alcohol use No  
 Drug use: No  
 Sexual activity: Not Currently Partners: Male Birth control/ protection: None Other Topics Concern  Not on file Social History Narrative ALLERGIES: Adhesive tape-silicones Review of Systems Constitutional: Positive for appetite change. Negative for fever. HENT: Negative for facial swelling and nosebleeds. Eyes: Negative for pain. Respiratory: Negative for cough, chest tightness and shortness of breath. Cardiovascular: Negative for chest pain and leg swelling. Gastrointestinal: Positive for abdominal distention, nausea and vomiting. Negative for abdominal pain and diarrhea. Endocrine: Negative for polyuria. Genitourinary: Negative for difficulty urinating and flank pain. Musculoskeletal: Negative for arthralgias and back pain. Skin: Negative for color change. Allergic/Immunologic: Negative for immunocompromised state. Neurological: Negative for dizziness and headaches. Hematological: Does not bruise/bleed easily. Psychiatric/Behavioral: Negative for agitation. All other systems reviewed and are negative. Vitals:  
 09/14/18 1345 BP: 151/67 Pulse: (!) 112 Resp: 18 Temp: 97.4 °F (36.3 °C) SpO2: 97% Weight: 110.9 kg (244 lb 6.4 oz) Height: 5' 4\" (1.626 m) Physical Exam  
Constitutional: She is oriented to person, place, and time. She appears well-developed and well-nourished. HENT:  
Head: Normocephalic and atraumatic. Right Ear: External ear normal.  
Left Ear: External ear normal.  
Nose: Nose normal.  
Mouth/Throat: Oropharynx is clear and moist.  
Eyes: EOM are normal. Pupils are equal, round, and reactive to light. No scleral icterus. Neck: Normal range of motion. Neck supple. No JVD present. No tracheal deviation present. No thyromegaly present. Cardiovascular: Regular rhythm, normal heart sounds and intact distal pulses. Tachycardia present. Exam reveals no friction rub. No murmur heard. Tachycardic at 110. Pulmonary/Chest: Effort normal and breath sounds normal. No stridor. No respiratory distress. She has no wheezes. She has no rales. She exhibits no tenderness. Abdominal: Soft. Bowel sounds are normal. She exhibits distension. There is no tenderness. There is no rebound and no guarding. Abdomen is distended, but no tenderness. Musculoskeletal: Normal range of motion. She exhibits edema. She exhibits no tenderness. 3+ edema to bilateral lower extremities. Lymphadenopathy:  
  She has no cervical adenopathy. Neurological: She is alert and oriented to person, place, and time.  She has normal reflexes. No cranial nerve deficit. Coordination normal.  
Skin: Skin is warm and dry. No rash noted. No erythema. Psychiatric: She has a normal mood and affect. Her behavior is normal. Judgment and thought content normal.  
Nursing note and vitals reviewed. Note written by Robert Tsai, as dictated by Lanie Parry MD 2:47 PM 
 
MDM Number of Diagnoses or Management Options Acute renal failure superimposed on chronic kidney disease, unspecified CKD stage, unspecified acute renal failure type [facility]):  
Cirrhosis of liver with ascites, unspecified hepatic cirrhosis type [facility]):  
Diagnosis management comments: 64-year-old white female with history of jain and cirrhosis presents to the emergency department with worsening kidney function. Primary doctor check kidney function today and her creatinine was worse. We'll recheck his levels. Symptoms are likely from balance of shifting fluids from cirrhosis as well as diuretics. Will reassess when testing his back. Patient agrees with this plan. Amount and/or Complexity of Data Reviewed Clinical lab tests: ordered and reviewed Tests in the radiology section of CPT®: reviewed Tests in the medicine section of CPT®: ordered and reviewed Discussion of test results with the performing providers: yes Decide to obtain previous medical records or to obtain history from someone other than the patient: yes Obtain history from someone other than the patient: yes Review and summarize past medical records: yes Discuss the patient with other providers: yes Independent visualization of images, tracings, or specimens: yes Risk of Complications, Morbidity, and/or Mortality Presenting problems: high Diagnostic procedures: high Management options: high ED Course Procedures 2:30 PM  
Attempted to see the pt, pt not roomed. Creat is elevated at 3.1 likely from cirrhosis and diuretics and fluid shifts Will consult hepatology and hospitalist 
 
Small fluid bolus given CONSULT NOTE: 
3:06 PM Pauly Rothman MD communicated with Dr. Paula Cobos, Consult for Hospitalist via [facility] Text. Discussed available diagnostic tests and clinical findings. He will see and admit the pt for worsening kidney fx. EKG: Sinus tachycardia, , normal axis, normal intervals, no ST elevations or depressions Pauly Rothman MD

## (undated) DEVICE — DRAPE XR C ARM 41X74IN LF --

## (undated) DEVICE — SUTURE VCRL SZ 2-0 L27IN ABSRB UD L36MM CP-1 1/2 CIR REV J266H

## (undated) DEVICE — KIT COLON W/ 1.1OZ LUB AND 2 END

## (undated) DEVICE — BANDAGE COMPR SELF ADH 5 YDX4 IN TAN STRL PREMIERPRO LF

## (undated) DEVICE — ARGYLE FRAZIER SURGICAL SUCTION INSTRUMENT 10 FR/CH (3.3 MM): Brand: ARGYLE

## (undated) DEVICE — SET GRAV CK VLV NEEDLESS ST 3 GANGED 4WAY STPCOCK HI FLO 10

## (undated) DEVICE — COVER,MAYO STAND,STERILE: Brand: MEDLINE

## (undated) DEVICE — INTENDED FOR TISSUE SEPARATION, AND OTHER PROCEDURES THAT REQUIRE A SHARP SURGICAL BLADE TO PUNCTURE OR CUT.: Brand: BARD-PARKER ® CARBON RIB-BACK BLADES

## (undated) DEVICE — CANN NASAL O2 CAPNOGRAPHY AD -- FILTERLINE

## (undated) DEVICE — DRAIN KT WND 10FR RND 400ML --

## (undated) DEVICE — INFECTION CONTROL KIT SYS

## (undated) DEVICE — TOWEL SURG W17XL27IN STD BLU COT NONFENESTRATED PREWASHED

## (undated) DEVICE — BIT DRL L110MM DIA3.5MM QUIK CPL W/O STP REUSE

## (undated) DEVICE — SUTURE FIBERWIRE SZ 2 W/ TAPERED NEEDLE BLUE L38IN NONABSORB BLU L26.5MM 1/2 CIRCLE AR7200

## (undated) DEVICE — STERILE POLYISOPRENE POWDER-FREE SURGICAL GLOVES: Brand: PROTEXIS

## (undated) DEVICE — SUTURE VCRL SZ 0 L36IN ABSRB VLT L36MM CT-1 1/2 CIR J346H

## (undated) DEVICE — REM POLYHESIVE ADULT PATIENT RETURN ELECTRODE: Brand: VALLEYLAB

## (undated) DEVICE — DRAPE,REIN 53X77,STERILE: Brand: MEDLINE

## (undated) DEVICE — KENDALL RADIOLUCENT FOAM MONITORING ELECTRODE -RECTANGULAR SHAPE: Brand: KENDALL

## (undated) DEVICE — 3M™ CUROS™ DISINFECTING CAP FOR NEEDLELESS CONNECTORS 270/CARTON 20 CARTONS/CASE CFF1-270: Brand: CUROS™

## (undated) DEVICE — 3M™ STERI-DRAPE™ U-DRAPE 1015: Brand: STERI-DRAPE™

## (undated) DEVICE — FORCEPS BX L240CM JAW DIA2.8MM L CAP W/ NDL MIC MESH TOOTH

## (undated) DEVICE — PACK,ORTHOPEDIC III,AURORA: Brand: MEDLINE

## (undated) DEVICE — SOLUTION IV 1000ML 0.9% SOD CHL

## (undated) DEVICE — 1200 GUARD II KIT W/5MM TUBE W/O VAC TUBE: Brand: GUARDIAN

## (undated) DEVICE — SURGICAL PROCEDURE PACK BASIN MAJ SET CUST NO CAUT

## (undated) DEVICE — STERILE POLYISOPRENE POWDER-FREE SURGICAL GLOVES WITH EMOLLIENT COATING: Brand: PROTEXIS

## (undated) DEVICE — CONTAINER SPEC 20 ML LID NEUT BUFF FORMALIN 10 % POLYPR STS

## (undated) DEVICE — PADDING CST 4INX4YD --

## (undated) DEVICE — 2.5MM DRILL BIT/QC/GOLD/110MM

## (undated) DEVICE — PREP SKN PREVAIL 40ML APPL --

## (undated) DEVICE — BASIN EMSIS 16OZ GRAPHITE PLAS KID SHP MOLD GRAD FOR ORAL

## (undated) DEVICE — HANDLE LT SNAP ON ULT DURABLE LENS FOR TRUMPF ALC DISPOSABLE

## (undated) DEVICE — DEVON™ KNEE AND BODY STRAP 60" X 3" (1.5 M X 7.6 CM): Brand: DEVON

## (undated) DEVICE — BIT DRL L145MM DIA3.2MM QUIK CPL W/O STP REUSE

## (undated) DEVICE — ROCKER SWITCH PENCIL BLADE ELECTRODE, HOLSTER: Brand: EDGE

## (undated) DEVICE — DRESSING HEMSTAT W4XL4IN 4 PLY WHT IMPREG KAOLIN HYDRPHLC

## (undated) DEVICE — (D)STRIP SKN CLSR 0.5X4IN WHT --

## (undated) DEVICE — SOLIDIFIER MEDC 1200ML -- CONVERT TO 356117

## (undated) DEVICE — SPONGE LAP 18X18IN STRL -- 5/PK

## (undated) DEVICE — GAUZE SPONGES,12 PLY: Brand: CURITY

## (undated) DEVICE — BAG SPEC BIOHZRD 10 X 10 IN --

## (undated) DEVICE — BITEBLOCK ENDOSCP 60FR MAXI WHT POLYETH STURDY W/ VELC WVN

## (undated) DEVICE — SIMPLICITY FLUFF UNDERPAD 23X36, MODERATE: Brand: SIMPLICITY